# Patient Record
Sex: FEMALE | Race: BLACK OR AFRICAN AMERICAN | NOT HISPANIC OR LATINO | Employment: OTHER | URBAN - METROPOLITAN AREA
[De-identification: names, ages, dates, MRNs, and addresses within clinical notes are randomized per-mention and may not be internally consistent; named-entity substitution may affect disease eponyms.]

---

## 2017-02-08 ENCOUNTER — TRANSFERRED RECORDS (OUTPATIENT)
Dept: HEALTH INFORMATION MANAGEMENT | Facility: CLINIC | Age: 32
End: 2017-02-08

## 2017-02-08 LAB
HPV ABSTRACT: NORMAL
PAP-ABSTRACT: NORMAL

## 2017-06-13 ENCOUNTER — OFFICE VISIT (OUTPATIENT)
Dept: FAMILY MEDICINE | Facility: CLINIC | Age: 32
End: 2017-06-13

## 2017-06-13 VITALS
WEIGHT: 152.8 LBS | BODY MASS INDEX: 26.09 KG/M2 | RESPIRATION RATE: 16 BRPM | SYSTOLIC BLOOD PRESSURE: 103 MMHG | HEART RATE: 77 BPM | DIASTOLIC BLOOD PRESSURE: 69 MMHG | OXYGEN SATURATION: 98 % | TEMPERATURE: 98.2 F | HEIGHT: 64 IN

## 2017-06-13 DIAGNOSIS — L70.0 ACNE VULGARIS: ICD-10-CM

## 2017-06-13 DIAGNOSIS — N97.9 FEMALE INFERTILITY: ICD-10-CM

## 2017-06-13 DIAGNOSIS — Z30.09 FAMILY PLANNING: ICD-10-CM

## 2017-06-13 DIAGNOSIS — Z13.9 SCREENING FOR CONDITION: Primary | ICD-10-CM

## 2017-06-13 RX ORDER — ADAPALENE GEL USP, 0.3% 3 MG/G
GEL TOPICAL AT BEDTIME
Qty: 59 G | Refills: 4 | Status: SHIPPED | OUTPATIENT
Start: 2017-06-13 | End: 2018-12-06

## 2017-06-13 RX ORDER — PNV NO.95/FERROUS FUM/FOLIC AC 28MG-0.8MG
1 TABLET ORAL DAILY
Qty: 90 TABLET | Refills: 3 | Status: SHIPPED | OUTPATIENT
Start: 2017-06-13 | End: 2020-08-24

## 2017-06-13 RX ORDER — CHOLECALCIFEROL (VITAMIN D3) 50 MCG
2000 TABLET ORAL
COMMUNITY
Start: 2017-02-08 | End: 2020-10-05

## 2017-06-13 RX ORDER — CLINDAMYCIN PHOSPHATE 11.9 MG/ML
SOLUTION TOPICAL
COMMUNITY
Start: 2017-02-14 | End: 2018-12-06

## 2017-06-13 RX ORDER — TRETINOIN 0.25 MG/G
CREAM TOPICAL
COMMUNITY
Start: 2017-04-18 | End: 2017-06-13 | Stop reason: ALTCHOICE

## 2017-06-13 RX ORDER — HYDROCORTISONE 2.5 %
CREAM (GRAM) TOPICAL
COMMUNITY
Start: 2017-04-18 | End: 2017-06-13 | Stop reason: ALTCHOICE

## 2017-06-13 ASSESSMENT — ENCOUNTER SYMPTOMS
PALPITATIONS: 0
DYSURIA: 0
HEADACHES: 0
FEVER: 0
FATIGUE: 0
ACTIVITY CHANGE: 0
UNEXPECTED WEIGHT CHANGE: 0
APPETITE CHANGE: 0
COUGH: 0
MYALGIAS: 0
HEMATURIA: 0
SHORTNESS OF BREATH: 0
ARTHRALGIAS: 0

## 2017-06-13 NOTE — MR AVS SNAPSHOT
After Visit Summary   6/13/2017    Gabriela Rooney    MRN: [id number]           Patient Information     Date Of Birth          1985        Visit Information        Provider Department      6/13/2017 4:20 PM Bin Mccollum MD [facility]        Today's Diagnoses     Screening for condition    -  1    Acne vulgaris        Family planning        Female infertility          Care Instructions    Here is the plan from today's visit    1. Screening for Hx of positive PPD (hX OF RECEIVING bcg)  - XR CHEST 1 VW (+PPD)- Normal XR    2. Acne vulgaris  - Stop using Hydrocortisone, and tretinoin cream  - adapalene (DIFFERIN) 0.3 % gel; Apply topically At Bedtime  Dispense: 59 g; Refill: 4  - Cont trying Clindamycin solution.  - Ok to use exfoliating cleanser to wash your face.    3. Family planning  - Ovulation Prediction Test TEST; 1 strip by In Vitro route daily Follow the instructions, and start testing your ovulation on day 9 after your menstrual period start  Dispense: 15 each; Refill: 1  - Take prenatal vitamins 1 tablet daily.    4. Female infertility  Will do blood test  Please come to lab on the 3rd day of your next menstrual period.  - TSH with free T4 reflex; Future  - Prolactin; Future  - Estradiol; Future  - Follicle stimulating hormone; Future  - Lutropin; Future    Follow up plan  In 1 month or sooner if your symptoms worsen.    Thank you for coming to [facility] today.  Lab Testing:  **If you had lab testing today and your results are reassuring or normal they will be mailed to you or sent through [employer] within 7 days.   **If the lab tests need quick action we will call you with the results.  The phone number we will call with results is # [phone] (home) . If this is not the best number please call our clinic and change the number.  Medication Refills:  If you need any refills please call your pharmacy and they will contact us.   If you need to  your  refill at a new pharmacy, please contact the new pharmacy directly. The new pharmacy will help you get your medications transferred faster.   Scheduling:  If you have any concerns about today's visit or wish to schedule another appointment please call our office during normal business hours [phone] (8-5:00 M-F)  If a referral was made to a [facility] and you don't get a call from central scheduling please call [phone].  If a Mammogram was ordered for you at [facility] call [phone] to schedule or change your appointment.  If you had an XRay/CT/Ultrasound/MRI ordered the number is [phone] to schedule or change your radiology appointment.   Medical Concerns:  If you have urgent medical concerns please call [phone] at any time of the day.            Follow-ups after your visit        Future tests that were ordered for you today     Open Future Orders        Priority Expected Expires Ordered    TSH with free T4 reflex Routine  6/13/2018 6/13/2017    Prolactin Routine  6/13/2018 6/13/2017    Estradiol Routine  6/13/2018 6/13/2017    Follicle stimulating hormone Routine  6/13/2018 6/13/2017    Lutropin Routine  6/13/2018 6/13/2017            Who to contact     Please call your clinic at [phone] to:    Ask questions about your health    Make or cancel appointments    Discuss your medicines    Learn about your test results    Speak to your doctor   If you have compliments or concerns about an experience at your clinic, or if you wish to file a complaint, please contact [facility] Patient Relations at [phone] or email us at [email or web address]         Additional Information About Your Visit        [employer] Information     [employer] is an electronic gateway that provides easy, online access to your medical records. With [employer], you can request a clinic appointment, read your test results, renew a prescription or communicate  "with your care team.     To sign up for [employer] visit the website at [email or web address]   You will be asked to enter the access code listed below, as well as some personal information. Please follow the directions to create your username and password.     Your access code is: [email or web address]  Expires: 2017  6:07 PM     Your access code will  in 90 days. If you need help or a new code, please contact your [facility] or call [phone] for assistance.        Care EveryWhere ID     This is your Care EveryWhere ID. This could be used by other organizations to access your [place] medical records  [id number]        Your Vitals Were     Pulse Temperature Respirations Height Pulse Oximetry Breastfeeding?    77 98.2  F (36.8  C) (Oral) 16 5' 4.25\" (163.2 cm) 98% No    BMI (Body Mass Index)                   26.02 kg/m2            Blood Pressure from Last 3 Encounters:   17 103/69   03/03/15 110/68   13 108/68    Weight from Last 3 Encounters:   17 152 lb 12.8 oz (69.3 kg)   03/03/15 151 lb 8 oz (68.7 kg)                 Today's Medication Changes          These changes are accurate as of: 17  6:07 PM.  If you have any questions, ask your nurse or doctor.               Start taking these medicines.        Dose/Directions    adapalene 0.3 % gel   Commonly known as:  DIFFERIN   Used for:  Acne vulgaris   Started by:  Bin Mccollum MD        Apply topically At Bedtime   Quantity:  59 g   Refills:  4       Ovulation Prediction Test Test   Used for:  Family planning   Started by:  Bin Mccollum MD        Dose:  1 strip   1 strip by In Vitro route daily Follow the instructions, and start testing your ovulation on day 9 after your menstrual period start   Quantity:  15 each   Refills:  1       Prenatal Vitamin 27-0.8 MG Tabs   Used for:  Family planning   Started by:  Bin Mccollum MD        Dose:  1 tablet   Take 1 tablet by mouth daily "   Quantity:  90 tablet   Refills:  3         Stop taking these medicines if you haven't already. Please contact your care team if you have questions.     hydrocortisone 2.5 % cream   Stopped by:  Bin Mccollum MD           tretinoin 0.025 % cream   Commonly known as:  RETIN-A   Stopped by:  Bin Mccollum MD                Where to get your medicines      These medications were sent to [facility] [place], [place] - [address], [facility] [address]     Phone:  [phone]     adapalene 0.3 % gel    Ovulation Prediction Test Test    Prenatal Vitamin 27-0.8 MG Tabs                Primary Care Provider Office Phone # Fax #    Bin Mccollum [phone]       [facility] [address]   [facility] [address]        Thank you!     Thank you for choosing [facility]  for your care. Our goal is always to provide you with excellent care. Hearing back from our patients is one way we can continue to improve our services. Please take a few minutes to complete the written survey that you may receive in the mail after your visit with us. Thank you!             Your Updated Medication List - Protect others around you: Learn how to safely use, store and throw away your medicines at www.disposemymeds.org.          This list is accurate as of: 6/13/17  6:07 PM.  Always use your most recent med list.                   Brand Name Dispense Instructions for use    adapalene 0.3 % gel    DIFFERIN    59 g    Apply topically At Bedtime       clindamycin 1 % solution    CLEOCIN T     Apply to face BID       Ovulation Prediction Test Test     15 each    1 strip by In Vitro route daily Follow the instructions, and start testing your ovulation on day 9 after your menstrual period start       Prenatal Vitamin 27-0.8 MG Tabs     90 tablet    Take 1 tablet by mouth daily       vitamin D 2000 UNITS tablet      Take 2,000 Units by mouth

## 2017-06-13 NOTE — PROGRESS NOTES
Preceptor Attestation:   Patient seen and discussed with the resident. Assessment and plan reviewed with resident and agreed upon.   Supervising Physician:  Renetta Oropeza MD  [facility]

## 2017-06-13 NOTE — LETTER
[facility]  [address],  [address]  [facility] [address]  Phone: [phone]  Fax: [phone]    June 13, 2017        Gabriela Rooney  [address] [address]          To whom it may concern:    This patient had a chest XR done on 6/13/2017 which was normal.    Please contact me for questions or concerns.        Sincerely,        Bin Mccollum MD

## 2017-06-13 NOTE — PROGRESS NOTES
HPI:       Gabriela Rooney is a 31 year old who presents today to establish care, and address health concerns.    1- Acne: Pt has Hx of acne, and had been treated in the past. For the last 3 months she believes that it has getting worse. She had been using topical clindamycin since 2017, as well as topical hydrocortisone and tretinoin since 2017 without significant improvement. Recently stopped using the clinda because she though it caused burning sensation after applying to her face.  No allergy reaction reported.  Her skin seems to be worse in the morning, especially if she doesn't wash her face at bedtime. She has oily skin.    2- Concerns for infertility: Had been trying to get pregnant for the last 2 years. She is a , with a miscarriage of her first pregnancy ( at 8 weeks). After that she tried for 3 years before getting pregnant again with her daughter of 3 years. After given birth she had a ParaGard IUD for 1 year (3/2014-3/2015) without complications but she decided to remove it because she wanted to have another child. However it has been two year trying, and she is still not pregnant. She report unprotected sexual intercourse 2-3 times/week. Her menstrual periods are regular every month, no Hx of abnormal Pap smears, no relevant family Hx, no medical problems, or complains. She doesn't exercise regularly but is very active during the day at work.  No smoker or drinker. No Hx of STDs, or PID.  Denies thyroid symptoms, galactorrhea, hirsutism, pelvic/abd pain, dysmenorrhea, or dyspareunia.   Her  is also healthy, and had a semen study  Several years ago (after miscarriage) which was normal.    3- Follow up chest XR results for today, which was done as a request from her work as pt has [place] of [place]. She received the BCG vaccine as a child.  Denies cough, shortness of breath, sweating, weight loss, fever, or hemoptysis.     An [facility]  was used  "for  this visit.      Problem, Medication and Allergy Lists were reviewed and are current.  Patient is an established patient of this clinic.         Review of Systems:   Review of Systems   Constitutional: Negative for activity change, appetite change, fatigue, fever and unexpected weight change.   Respiratory: Negative for cough and shortness of breath.    Cardiovascular: Negative for chest pain and palpitations.   Genitourinary: Negative for dyspareunia, dysuria, hematuria, menstrual problem, pelvic pain, vaginal discharge and vaginal pain.   Musculoskeletal: Negative for arthralgias and myalgias.   Skin: Positive for rash (acne).   Neurological: Negative for headaches.   Psychiatric/Behavioral: Negative for behavioral problems.             Physical Exam:   Patient Vitals for the past 24 hrs:   BP Temp Temp src Pulse Resp SpO2 Height Weight   06/13/17 1639 103/69 98.2  F (36.8  C) Oral 77 16 98 % 5' 4.25\" (163.2 cm) 152 lb 12.8 oz (69.3 kg)     Body mass index is 26.02 kg/(m^2).     Vitals were reviewed and were normal     Physical Exam   Constitutional: She appears well-developed and well-nourished. No distress.   HENT:   Head: Normocephalic and atraumatic.   Eyes: Conjunctivae and EOM are normal.   Neck: Normal range of motion. Neck supple. No thyromegaly present.   Cardiovascular: Normal rate, regular rhythm and normal heart sounds.    No murmur heard.  Pulmonary/Chest: Effort normal and breath sounds normal.   Abdominal: Soft. Bowel sounds are normal. She exhibits no distension and no mass. There is no tenderness.   Musculoskeletal: Normal range of motion. She exhibits no edema, tenderness or deformity.   Neurological: She is alert.   Skin: Skin is warm. Rash noted.   Papulopustular acne w/ some comedones. No scaring or hyperpigmentation.      Psychiatric: She has a normal mood and affect. Her behavior is normal.          Results:   None    Assessment and Plan     1. Acne vulgaris  - Stop topical " Hydrocortisone, and tretinoin cream.  - Start adapalene (DIFFERIN) 0.3 % gel; Apply topically At Bedtime  Dispense: 59 g; Refill: 4  - Cont trying Clindamycin solution.  - Ok to use exfoliating cleanser to wash your face.    2. Family planning  - Ovulation Prediction Test TEST; 1 strip by In Vitro route daily Follow the instructions, and start testing your ovulation on day 9 after your menstrual period start  Dispense: 15 each; Refill: 1  - Take prenatal vitamins 1 tablet daily. (Pt reported that will preferred prenatal instead of just the recommended ac. Folic.)    3. Concern for infertility  Will do blood test. If results are WNL may consider GYN eval for further test such as HSG.  Please come to lab on the 3rd day of your next menstrual period.  - TSH with free T4 reflex; Future  - Prolactin; Future  - Estradiol; Future (on 3rd day of menstrual period)  - Follicle stimulating hormone; Future (on 3rd day of menstrual period)  - Lutropin; Future    4. Screening for TB Hx of positive PPD (Hx of receiving bcg vaccine)- Asymptomatic  - XR CHEST 1 VW (+PPD)- Normal XR  Letter for work given to pt.    Medications Discontinued During This Encounter   Medication Reason     hydrocortisone 2.5 % cream Alternate therapy     tretinoin (RETIN-A) 0.025 % cream Alternate therapy     Options for treatment and follow-up care were reviewed with the patient. Gabriela Rooney  engaged in the decision making process and verbalized understanding of the options discussed and agreed with the final plan.    Bin Mccollum MD

## 2017-06-13 NOTE — PATIENT INSTRUCTIONS
Here is the plan from today's visit    1. Screening for Hx of positive PPD (hX OF RECEIVING bcg)  - XR CHEST 1 VW (+PPD)- Normal XR    2. Acne vulgaris  - Stop using Hydrocortisone, and tretinoin cream  - adapalene (DIFFERIN) 0.3 % gel; Apply topically At Bedtime  Dispense: 59 g; Refill: 4  - Cont trying Clindamycin solution.  - Ok to use exfoliating cleanser to wash your face.    3. Family planning  - Ovulation Prediction Test TEST; 1 strip by In Vitro route daily Follow the instructions, and start testing your ovulation on day 9 after your menstrual period start  Dispense: 15 each; Refill: 1  - Take prenatal vitamins 1 tablet daily.    4. Female infertility  Will do blood test  Please come to lab on the 3rd day of your next menstrual period.  - TSH with free T4 reflex; Future  - Prolactin; Future  - Estradiol; Future  - Follicle stimulating hormone; Future  - Lutropin; Future    Follow up plan  In 1 month or sooner if your symptoms worsen.    Thank you for coming to [facility] today.  Lab Testing:  **If you had lab testing today and your results are reassuring or normal they will be mailed to you or sent through "[employer] within 7 days.   **If the lab tests need quick action we will call you with the results.  The phone number we will call with results is # [phone] (home) . If this is not the best number please call our clinic and change the number.  Medication Refills:  If you need any refills please call your pharmacy and they will contact us.   If you need to  your refill at a new pharmacy, please contact the new pharmacy directly. The new pharmacy will help you get your medications transferred faster.   Scheduling:  If you have any concerns about today's visit or wish to schedule another appointment please call our office during normal business hours [phone] (8-5:00 M-F)  If a referral was made to a [facility] and you don't get a call from central scheduling please  call [phone].  If a Mammogram was ordered for you at [facility] call [phone] to schedule or change your appointment.  If you had an XRay/CT/Ultrasound/MRI ordered the number is [phone] to schedule or change your radiology appointment.   Medical Concerns:  If you have urgent medical concerns please call [phone] at any time of the day.

## 2017-08-07 ENCOUNTER — APPOINTMENT (OUTPATIENT)
Dept: ULTRASOUND IMAGING | Facility: CLINIC | Age: 32
End: 2017-08-07
Attending: EMERGENCY MEDICINE
Payer: COMMERCIAL

## 2017-08-07 ENCOUNTER — HOSPITAL ENCOUNTER (EMERGENCY)
Facility: CLINIC | Age: 32
Discharge: HOME | End: 2017-08-07
Attending: EMERGENCY MEDICINE | Admitting: EMERGENCY MEDICINE
Payer: COMMERCIAL

## 2017-08-07 VITALS
RESPIRATION RATE: 16 BRPM | HEART RATE: 61 BPM | BODY MASS INDEX: 26.04 KG/M2 | OXYGEN SATURATION: 98 % | TEMPERATURE: 99.1 F | WEIGHT: 152.9 LBS | SYSTOLIC BLOOD PRESSURE: 100 MMHG | DIASTOLIC BLOOD PRESSURE: 64 MMHG

## 2017-08-07 DIAGNOSIS — N76.0 ACUTE VAGINITIS: ICD-10-CM

## 2017-08-07 DIAGNOSIS — N83.201 OVARIAN CYST, RIGHT: ICD-10-CM

## 2017-08-07 DIAGNOSIS — B96.89 BACTERIAL VAGINITIS: ICD-10-CM

## 2017-08-07 DIAGNOSIS — N76.0 BACTERIAL VAGINITIS: ICD-10-CM

## 2017-08-07 DIAGNOSIS — A49.8 CLOSTRIDIUM DIFFICILE INFECTION: ICD-10-CM

## 2017-08-07 DIAGNOSIS — N83.201 CYST OF RIGHT OVARY: ICD-10-CM

## 2017-08-07 LAB
ABO + RH BLD: NORMAL
ABO + RH BLD: NORMAL
ALBUMIN UR-MCNC: NEGATIVE MG/DL
ANION GAP SERPL CALCULATED.3IONS-SCNC: 8 MMOL/L (ref 3–14)
APPEARANCE UR: CLEAR
BASOPHILS # BLD AUTO: 0 10E9/L (ref 0–0.2)
BASOPHILS NFR BLD AUTO: 0.3 %
BILIRUB UR QL STRIP: NEGATIVE
BUN SERPL-MCNC: 9 MG/DL (ref 7–30)
CALCIUM SERPL-MCNC: 9 MG/DL (ref 8.5–10.1)
CHLORIDE SERPL-SCNC: 109 MMOL/L (ref 94–109)
CO2 SERPL-SCNC: 27 MMOL/L (ref 20–32)
COLOR UR AUTO: NORMAL
CREAT SERPL-MCNC: 0.62 MG/DL (ref 0.52–1.04)
DIFFERENTIAL METHOD BLD: ABNORMAL
EOSINOPHIL # BLD AUTO: 0.1 10E9/L (ref 0–0.7)
EOSINOPHIL NFR BLD AUTO: 4.3 %
ERYTHROCYTE [DISTWIDTH] IN BLOOD BY AUTOMATED COUNT: 12.5 % (ref 10–15)
GFR SERPL CREATININE-BSD FRML MDRD: NORMAL ML/MIN/1.7M2
GLUCOSE SERPL-MCNC: 88 MG/DL (ref 70–99)
GLUCOSE UR STRIP-MCNC: NEGATIVE MG/DL
HCG UR QL: NEGATIVE
HCT VFR BLD AUTO: 41.8 % (ref 35–47)
HGB BLD-MCNC: 14 G/DL (ref 11.7–15.7)
HGB UR QL STRIP: NEGATIVE
IMM GRANULOCYTES # BLD: 0 10E9/L (ref 0–0.4)
IMM GRANULOCYTES NFR BLD: 0 %
INTERNAL QC OK POCT: YES
KETONES UR STRIP-MCNC: NEGATIVE MG/DL
LEUKOCYTE ESTERASE UR QL STRIP: NEGATIVE
LYMPHOCYTES # BLD AUTO: 1.4 10E9/L (ref 0.8–5.3)
LYMPHOCYTES NFR BLD AUTO: 45.8 %
MCH RBC QN AUTO: 30.1 PG (ref 26.5–33)
MCHC RBC AUTO-ENTMCNC: 33.5 G/DL (ref 31.5–36.5)
MCV RBC AUTO: 90 FL (ref 78–100)
MICRO REPORT STATUS: ABNORMAL
MONOCYTES # BLD AUTO: 0.4 10E9/L (ref 0–1.3)
MONOCYTES NFR BLD AUTO: 14 %
NEUTROPHILS # BLD AUTO: 1.1 10E9/L (ref 1.6–8.3)
NEUTROPHILS NFR BLD AUTO: 35.6 %
NITRATE UR QL: NEGATIVE
NRBC # BLD AUTO: 0 10*3/UL
NRBC BLD AUTO-RTO: 0 /100
PH UR STRIP: 5.5 PH (ref 5–7)
PLATELET # BLD AUTO: 171 10E9/L (ref 150–450)
POTASSIUM SERPL-SCNC: 3.5 MMOL/L (ref 3.4–5.3)
RBC # BLD AUTO: 4.65 10E12/L (ref 3.8–5.2)
RBC #/AREA URNS AUTO: 1 /HPF (ref 0–2)
SODIUM SERPL-SCNC: 144 MMOL/L (ref 133–144)
SP GR UR STRIP: 1.01 (ref 1–1.03)
SPECIMEN EXP DATE BLD: NORMAL
SPECIMEN SOURCE: ABNORMAL
URN SPEC COLLECT METH UR: NORMAL
UROBILINOGEN UR STRIP-MCNC: NORMAL MG/DL (ref 0–2)
WBC # BLD AUTO: 3 10E9/L (ref 4–11)
WBC #/AREA URNS AUTO: <1 /HPF (ref 0–2)
WET PREP SPEC: ABNORMAL

## 2017-08-07 PROCEDURE — 99284 EMERGENCY DEPT VISIT MOD MDM: CPT | Mod: Z6 | Performed by: EMERGENCY MEDICINE

## 2017-08-07 PROCEDURE — 86901 BLOOD TYPING SEROLOGIC RH(D): CPT | Performed by: EMERGENCY MEDICINE

## 2017-08-07 PROCEDURE — 81001 URINALYSIS AUTO W/SCOPE: CPT | Performed by: EMERGENCY MEDICINE

## 2017-08-07 PROCEDURE — 87210 SMEAR WET MOUNT SALINE/INK: CPT | Performed by: EMERGENCY MEDICINE

## 2017-08-07 PROCEDURE — 87591 N.GONORRHOEAE DNA AMP PROB: CPT | Performed by: EMERGENCY MEDICINE

## 2017-08-07 PROCEDURE — 86900 BLOOD TYPING SEROLOGIC ABO: CPT | Performed by: EMERGENCY MEDICINE

## 2017-08-07 PROCEDURE — 93976 VASCULAR STUDY: CPT

## 2017-08-07 PROCEDURE — 85025 COMPLETE CBC W/AUTO DIFF WBC: CPT | Performed by: EMERGENCY MEDICINE

## 2017-08-07 PROCEDURE — 87491 CHLMYD TRACH DNA AMP PROBE: CPT | Performed by: EMERGENCY MEDICINE

## 2017-08-07 PROCEDURE — 80048 BASIC METABOLIC PNL TOTAL CA: CPT | Performed by: EMERGENCY MEDICINE

## 2017-08-07 PROCEDURE — 81025 URINE PREGNANCY TEST: CPT | Performed by: EMERGENCY MEDICINE

## 2017-08-07 PROCEDURE — 99284 EMERGENCY DEPT VISIT MOD MDM: CPT | Mod: 25 | Performed by: EMERGENCY MEDICINE

## 2017-08-07 RX ORDER — METRONIDAZOLE 500 MG/1
500 TABLET ORAL 2 TIMES DAILY
Qty: 14 TABLET | Refills: 0 | Status: SHIPPED | OUTPATIENT
Start: 2017-08-07 | End: 2017-08-14

## 2017-08-07 NOTE — ED AVS SNAPSHOT
[facility], [place], Emergency Department    [address]    [place] [address]    Phone:  [phone]    Fax:  [phone]                                       Trina Rooney   MRN: [id number]    Department:  [facility], Emergency Department   Date of Visit:  8/7/2017           After Visit Summary Signature Page     I have received my discharge instructions, and my questions have been answered. I have discussed any challenges I see with this plan with the nurse or doctor.    ..........................................................................................................................................  Patient/Patient Representative Signature      ..........................................................................................................................................  Patient Representative Print Name and Relationship to Patient    ..................................................               ................................................  Date                                            Time    ..........................................................................................................................................  Reviewed by Signature/Title    ...................................................              ..............................................  Date                                                            Time

## 2017-08-07 NOTE — ED PROVIDER NOTES
History     Chief Complaint   Patient presents with     Vaginal Bleeding     vaginal bleeding started last night, abdominal pain. Pt states it is not her period     HPI  Trina Rooney is a 31 year old female who presents to emergency with complaints of vaginal bleeding that is not like her usual period.  Patient states that her last normal menstrual period was in June more than 1 month ago, and states that this bleeding has brighter red blood than her normal. Patient states she also has some right lower quadrant abdominal pain associated with this bleeding.  She denies any UTI symptoms, fevers, nausea, vomiting, or diarrhea.  Patient states that her menstrual cycles are usually irregular.    This part of the medical record was transcribed by Phillip Yeager Medical Scribe, from a dictation done by Williams Tatum MD.     PAST MEDICAL HISTORY  History reviewed. No pertinent past medical history.  PAST SURGICAL HISTORY  History reviewed. No pertinent surgical history.  FAMILY HISTORY  Family History   Problem Relation Age of Onset     Family History Negative       SOCIAL HISTORY  Social History   Substance Use Topics     Smoking status: Never Smoker     Smokeless tobacco: Never Used     Alcohol use No     MEDICATIONS  Previous Medications    ADAPALENE (DIFFERIN) 0.3 % GEL    Apply topically At Bedtime    CHOLECALCIFEROL (VITAMIN D) 2000 UNITS TABLET    Take 2,000 Units by mouth    CLINDAMYCIN (CLEOCIN T) 1 % SOLUTION    Apply to face BID    OVULATION PREDICTION TEST TEST    1 strip by In Vitro route daily Follow the instructions, and start testing your ovulation on day 9 after your menstrual period start    PRENATAL VIT-FE FUMARATE-FA (PRENATAL VITAMIN) 27-0.8 MG TABS    Take 1 tablet by mouth daily     ALLERGIES  No Known Allergies      I have reviewed the Medications, Allergies, Past Medical and Surgical History, and Social History in the Epic system.    Review of Systems   All other systems reviewed and are  negative.      Physical Exam   BP: 113/73  Pulse: 61  Temp: 99.1  F (37.3  C)  Resp: 16  Weight: 69.4 kg (152 lb 14.4 oz)  SpO2: 100 %  Physical Exam   Constitutional: She is oriented to person, place, and time. She appears well-nourished.   Alert conversant and moves about without difficulty   HENT:   Head: Atraumatic.   Eyes: EOM are normal. Pupils are equal, round, and reactive to light.   Neck: Neck supple.   Cardiovascular: Normal heart sounds.    Pulmonary/Chest: Breath sounds normal.   Abdominal: Soft. There is tenderness (right lower quadrant). There is no rebound and no guarding.   Genitourinary:   Genitourinary Comments: Pelvic exam reveals minimal blood.  Cultures were sent to the lab and the patient had some slight cervical motion tenderness with right adnexal tenderness.   Musculoskeletal: She exhibits no edema or tenderness.   Neurological: She is alert and oriented to person, place, and time.   Grossly intact and symmetric   Skin: Skin is warm.   Psychiatric: She has a normal mood and affect.       ED Course     ED Course     Procedures        Results for orders placed or performed during the hospital encounter of 08/07/17   US Pelvis Cmplt w Transvag & Doppler LmtPel Duplex Limited    Narrative    PELVIC ULTRASOUND WITH ENDOVAGINAL TRANSDUCER  IMAGING  8/7/2017 3:39  PM     HISTORY: Rule out torsion.    TECHNIQUE:  Endovaginal sonography was added to the transabdominal  exam to better evaluate the uterus and ovaries.    COMPARISON: None.    FINDINGS: Endometrium is 6 mm thick. Uterus is 6.4 x 5.2 x 3.9 cm.  There is complex fluid dilating the endocervical canal with some  internal focal echogenic material within the fluid. The fluid measures  a thickness of 1.1 cm. Left ovary appears normal. The right ovary  contains a 1.3 cm complex cystic lesion. Color and Doppler spectral  analysis of each ovary shows normal waveforms.      Impression    IMPRESSION:    1. Mildly complex cystic structure at the  right ovary is only 1.3 cm.  No free fluid. This could just be a functional cyst or hemorrhagic  cyst. Recommend a follow-up pelvic ultrasound in 6-8 weeks to ensure  resolution.  2. Moderate fluid distends the endocervical canal appears complex.  This could represent blood products.   CBC with platelets differential   Result Value Ref Range    WBC 3.0 (L) 4.0 - 11.0 10e9/L    RBC Count 4.65 3.8 - 5.2 10e12/L    Hemoglobin 14.0 11.7 - 15.7 g/dL    Hematocrit 41.8 35.0 - 47.0 %    MCV 90 78 - 100 fl    MCH 30.1 26.5 - 33.0 pg    MCHC 33.5 31.5 - 36.5 g/dL    RDW 12.5 10.0 - 15.0 %    Platelet Count 171 150 - 450 10e9/L    Diff Method Automated Method     % Neutrophils 35.6 %    % Lymphocytes 45.8 %    % Monocytes 14.0 %    % Eosinophils 4.3 %    % Basophils 0.3 %    % Immature Granulocytes 0.0 %    Nucleated RBCs 0 0 /100    Absolute Neutrophil 1.1 (L) 1.6 - 8.3 10e9/L    Absolute Lymphocytes 1.4 0.8 - 5.3 10e9/L    Absolute Monocytes 0.4 0.0 - 1.3 10e9/L    Absolute Eosinophils 0.1 0.0 - 0.7 10e9/L    Absolute Basophils 0.0 0.0 - 0.2 10e9/L    Abs Immature Granulocytes 0.0 0 - 0.4 10e9/L    Absolute Nucleated RBC 0.0    Basic metabolic panel   Result Value Ref Range    Sodium 144 133 - 144 mmol/L    Potassium 3.5 3.4 - 5.3 mmol/L    Chloride 109 94 - 109 mmol/L    Carbon Dioxide 27 20 - 32 mmol/L    Anion Gap 8 3 - 14 mmol/L    Glucose 88 70 - 99 mg/dL    Urea Nitrogen 9 7 - 30 mg/dL    Creatinine 0.62 0.52 - 1.04 mg/dL    GFR Estimate >90  Non  GFR Calc   >60 mL/min/1.7m2    GFR Estimate If Black >90   GFR Calc   >60 mL/min/1.7m2    Calcium 9.0 8.5 - 10.1 mg/dL   UA with Microscopic reflex to Culture   Result Value Ref Range    Color Urine Light Yellow     Appearance Urine Clear     Glucose Urine Negative NEG mg/dL    Bilirubin Urine Negative NEG    Ketones Urine Negative NEG mg/dL    Specific Gravity Urine 1.006 1.003 - 1.035    Blood Urine Negative NEG    pH Urine 5.5 5.0 - 7.0 pH     Protein Albumin Urine Negative NEG mg/dL    Urobilinogen mg/dL Normal 0.0 - 2.0 mg/dL    Nitrite Urine Negative NEG    Leukocyte Esterase Urine Negative NEG    Source Unspecified Urine     WBC Urine <1 0 - 2 /HPF    RBC Urine 1 0 - 2 /HPF   hCG qual urine POCT   Result Value Ref Range    HCG Qual Urine Negative neg    Internal QC OK Yes    ABO and Rh   Result Value Ref Range    ABO O     RH(D)  Pos     Specimen Expires 08/10/2017    Wet prep   Result Value Ref Range    Specimen Description Vagina     Wet Prep (A)      No Trichomonas seen  Rare Clue cells seen  Few PMNs seen  No yeast seen      Micro Report Status FINAL 08/07/2017        Labs Ordered and Resulted from Time of ED Arrival Up to the Time of Departure from the ED   CBC WITH PLATELETS DIFFERENTIAL - Abnormal; Notable for the following:        Result Value    WBC 3.0 (*)     Absolute Neutrophil 1.1 (*)     All other components within normal limits   WET PREP - Abnormal; Notable for the following:     Wet Prep   (*)     Value: No Trichomonas seen  Rare Clue cells seen  Few PMNs seen  No yeast seen      All other components within normal limits   HCG QUAL URINE POCT - Normal   BASIC METABOLIC PANEL   ROUTINE UA WITH MICROSCOPIC REFLEX TO CULTURE   PERIPHERAL IV CATHETER   PREP FOR PROCEDURE   ABO AND RH   CHLAMYDIA TRACHOMATIS PCR   NEISSERIA GONORRHOEAE PCR         Assessments & Plan (with Medical Decision Making)     I have reviewed the nursing notes.    Differential diagnosis upon presentation to the ER included UTI, ovarian torsion, ectopic pregnancy, salpingitis/PID.  Bimanual exam isolated the tenderness to the right adnexa and ultrasound revealed an ovarian cyst without torsion.    I have reviewed the findings, diagnosis, plan and need for follow up with the patient.    New Prescriptions    METRONIDAZOLE (FLAGYL) 500 MG TABLET    Take 1 tablet (500 mg) by mouth 2 times daily for 7 days       Final diagnoses:   Bacterial vaginitis   Ovarian cyst,  right     Please make an appointment to follow up with Your Primary Care Provider or our OB/Gyn--[facility] (phone: [phone]) in 10-14 days for recheck.    Routine discharge instructions were given for the diagnoses above.    Williams Tatum MD    8/7/2017   [facility], EMERGENCY DEPARTMENT     Williams Tatum MD  08/07/17 5976

## 2017-08-07 NOTE — DISCHARGE INSTRUCTIONS
Please make an appointment to follow up with Your Primary Care Provider or our OB/Gyn--[facility] (phone: [phone]) in 10-14 days for recheck.

## 2017-08-07 NOTE — ED AVS SNAPSHOT
[facility], Emergency Department    [address] [place] [address]    Phone:  [phone]    Fax:  [phone]                                       Trina Rooney   MRN: [id number]    Department:  [facility], Emergency Department   Date of Visit:  8/7/2017           Patient Information     Date Of Birth          1985        Your diagnoses for this visit were:     Bacterial vaginitis     Ovarian cyst, right        You were seen by Williams Tatum MD.        Discharge Instructions       Please make an appointment to follow up with Your Primary Care Provider or our OB/Gyn--[facility] (phone: [phone]) in 10-14 days for recheck.    Discharge References/Attachments     BACTERIAL VAGINOSIS (BV) (ENGLISH)    OVARIAN CYST (ENGLISH)      24 Hour Appointment Hotline       To make an appointment at any [facility], call [phone]). If you don't have a family doctor or clinic, we will help you find one. [facility] clinics are conveniently located to serve the needs of you and your family.             Review of your medicines      START taking        Dose / Directions Last dose taken    metroNIDAZOLE 500 MG tablet   Commonly known as:  FLAGYL   Dose:  500 mg   Quantity:  14 tablet        Take 1 tablet (500 mg) by mouth 2 times daily for 7 days   Refills:  0          Our records show that you are taking the medicines listed below. If these are incorrect, please call your family doctor or clinic.        Dose / Directions Last dose taken    adapalene 0.3 % gel   Commonly known as:  DIFFERIN   Quantity:  59 g        Apply topically At Bedtime   Refills:  4        clindamycin 1 % solution   Commonly known as:  CLEOCIN T        Apply to face BID   Refills:  0        Ovulation Prediction Test Test   Dose:  1 strip   Quantity:  15 each        1 strip by In Vitro route daily Follow the instructions, and start testing your ovulation on day 9 after your  menstrual period start   Refills:  1        Prenatal Vitamin 27-0.8 MG Tabs   Dose:  1 tablet   Quantity:  90 tablet        Take 1 tablet by mouth daily   Refills:  3        vitamin D 2000 UNITS tablet   Dose:  2000 Units        Take 2,000 Units by mouth   Refills:  0                Prescriptions were sent or printed at these locations (1 Prescription)                   Other Prescriptions                Printed at Department/Unit printer (1 of 1)         metroNIDAZOLE (FLAGYL) 500 MG tablet                Procedures and tests performed during your visit     ABO and Rh    Basic metabolic panel    CBC with platelets differential    Chlamydia trachomatis PCR    Neisseria gonorrhoea PCR    Peripheral IV: Standard    Prep for procedure - pelvic exam    UA with Microscopic reflex to Culture    US Pelvis Cmplt w Transvag & Doppler LmtPel Duplex Limited    Wet prep    hCG qual urine POCT      Orders Needing Specimen Collection     None      Pending Results     Date and Time Order Name Status Description    8/7/2017 1509 US Pelvis Cmplt w Transvag & Doppler LmtPel Duplex Limited Preliminary     8/7/2017 1434 Neisseria gonorrhoea PCR In process     8/7/2017 1434 Chlamydia trachomatis PCR In process             Pending Culture Results     Date and Time Order Name Status Description    8/7/2017 1434 Neisseria gonorrhoea PCR In process     8/7/2017 1434 Chlamydia trachomatis PCR In process             Pending Results Instructions     If you had any lab results that were not finalized at the time of your Discharge, you can call the ED Lab Result RN at [phone]. You will be contacted by this team for any positive Lab results or changes in treatment. The nurses are available 7 days a week from 10A to 6:30P.  You can leave a message 24 hours per day and they will return your call.        Thank you for choosing [facility]       Thank you for choosing [facility] for your care. Our goal is always to provide you with excellent care.  "Hearing back from our patients is one way we can continue to improve our services. Please take a few minutes to complete the written survey that you may receive in the mail after you visit with us. Thank you!        [employer] Information     [employer] lets you send messages to your doctor, view your test results, renew your prescriptions, schedule appointments and more. To sign up, go to [email or web address] . Click on \"Log in\" on the left side of the screen, which will take you to the Welcome page. Then click on \"Sign up Now\" on the right side of the page.     You will be asked to enter the access code listed below, as well as some personal information. Please follow the directions to create your username and password.     Your access code is: [email or web address]  Expires: 2017  6:07 PM     Your access code will  in 90 days. If you need help or a new code, please call your [place] clinic or [phone].        Care EveryWhere ID     This is your Care EveryWhere ID. This could be used by other organizations to access your [place] medical records  [id number]        Equal Access to Services     ERICKA CHAVARRIA : Hadmaryan Mcgowan, lisbeth kee, jigna ho, dotty roth . So [facility] [phone].    ATENCIÓN: Si habla español, tiene a sam disposición servicios gratuitos de asistencia lingüística. Llame al [phone].    We comply with applicable federal civil rights laws and [place] laws. We do not discriminate on the basis of race, color, national origin, age, disability sex, sexual orientation or gender identity.            After Visit Summary       This is your record. Keep this with you and show to your community pharmacist(s) and doctor(s) at your next visit.                  "

## 2017-08-08 LAB
C TRACH DNA SPEC QL NAA+PROBE: NORMAL
N GONORRHOEA DNA SPEC QL NAA+PROBE: NORMAL
SPECIMEN SOURCE: NORMAL
SPECIMEN SOURCE: NORMAL

## 2017-10-31 ENCOUNTER — OFFICE VISIT (OUTPATIENT)
Dept: FAMILY MEDICINE | Facility: CLINIC | Age: 32
End: 2017-10-31

## 2017-10-31 VITALS
TEMPERATURE: 98 F | SYSTOLIC BLOOD PRESSURE: 102 MMHG | RESPIRATION RATE: 20 BRPM | OXYGEN SATURATION: 98 % | HEART RATE: 65 BPM | BODY MASS INDEX: 26.06 KG/M2 | WEIGHT: 153 LBS | DIASTOLIC BLOOD PRESSURE: 67 MMHG

## 2017-10-31 DIAGNOSIS — B35.9 TINEA: ICD-10-CM

## 2017-10-31 DIAGNOSIS — N97.9 FEMALE INFERTILITY: Primary | ICD-10-CM

## 2017-10-31 RX ORDER — CLOTRIMAZOLE 1 %
CREAM (GRAM) TOPICAL 2 TIMES DAILY
Qty: 15 G | Refills: 1 | Status: SHIPPED | OUTPATIENT
Start: 2017-10-31 | End: 2018-12-06

## 2017-10-31 ASSESSMENT — ENCOUNTER SYMPTOMS
FEVER: 0
DIFFICULTY URINATING: 0
DYSURIA: 0
SHORTNESS OF BREATH: 0
ABDOMINAL PAIN: 0
CHILLS: 0
COUGH: 0

## 2017-10-31 NOTE — PROGRESS NOTES
Preceptor Attestation:   Patient seen and discussed with the resident. Assessment and plan reviewed with resident and agreed upon.   Supervising Physician:  Renetta Oropeza MD  [facility]

## 2017-10-31 NOTE — PROGRESS NOTES
HPI:       Trina Rooney is a 31 year old who presents for the following  Patient presents with:  RECHECK: Discuss about Fertility     She has been trying to get pregnant for the past 2 years. Last child was born in 2014 by  at [facility]. Had paraguard IUD for 1 year that was removed in 3/2015. Her menses have been regular for the past 4 months and were irregular for 2 months. States that her menstrual cycle is 28 days long and her menses last for 3 days with normal flow. LMP: 10/26/17. She has sexual intercourse with her  on day 10-21 of her cycle every day or every other day. No previous medical history of STD's or PID. No previous radiation or chemotherapy.     Had previous lab work up for infertility by OB/GYN at [facility] in 2017. She used ovulation kits for one month however she is unable to use them now due to cost and insurance does not cover them.  had a normal semen analysis in . In  she had one miscarriage at 6 weeks with no chromosomal testing. No family history of miscarriages or birth defects. Has been taking prenatal vitamins.     Itchy ring-shaped rash on left upper arm that is unchanged for one week. Her daughter has a similar rash on her scalp that is worsening. Has never had this before.     Problem, Medication and Allergy Lists were reviewed and are current.  Patient is   an established patient of this clinic., History reviewed. No pertinent past medical history.,   Family History     Problem (# of Occurrences) Relation (Name,Age of Onset)    Family History Negative (1) Other       and   Social History     Social History     Marital status: Single     Spouse name: N/A     Number of children: N/A     Years of education: N/A     Social History Main Topics     Smoking status: Never Smoker     Smokeless tobacco: Never Used     Alcohol use No     Drug use: No     Sexual activity: Yes     Partners: Male            Review of Systems:   Review of Systems   Constitutional:  Negative for chills and fever.   HENT: Negative for congestion.    Respiratory: Negative for cough and shortness of breath.    Cardiovascular: Negative for chest pain.   Gastrointestinal: Negative for abdominal pain.   Genitourinary: Negative for difficulty urinating, dyspareunia, dysuria, genital sores, menstrual problem, pelvic pain, vaginal bleeding and vaginal discharge.   Skin: Positive for rash.             Physical Exam:     Patient Vitals for the past 24 hrs:   BP Temp Temp src Pulse Resp SpO2 Weight   10/31/17 1308 102/67 98  F (36.7  C) Oral 65 20 98 % 153 lb (69.4 kg)     Body mass index is 26.06 kg/(m^2).  Vitals were reviewed and were normal     Physical Exam   Constitutional: She appears well-developed and well-nourished.   HENT:   Head: Normocephalic and atraumatic.   Eyes: Conjunctivae are normal.   Cardiovascular: Normal rate and regular rhythm.    No murmur heard.  Pulmonary/Chest: Effort normal and breath sounds normal. She has no wheezes.   Abdominal: Soft. Bowel sounds are normal. She exhibits no distension. There is no tenderness. There is no rebound and no guarding.   Genitourinary: Vagina normal and uterus normal. Right adnexum displays no mass and no tenderness. Left adnexum displays no mass and no tenderness.   Musculoskeletal: She exhibits no edema or tenderness.   Skin: Skin is warm and dry.   Annular red, scaly lesion is present on the left forearm    Psychiatric: She has a normal mood and affect. Her behavior is normal.         Results:      Results from the last 24 hoursNo results found for this or any previous visit (from the past 24 hour(s)).  Assessment and Plan     Trina was seen today for recheck.    Diagnoses and all orders for this visit:    Female infertility  Prior work up with [facility] was normal with FSH (2.8), progesterone (2.4), estradiol (68), TSH (0.79) and anti-mullerian hormone (1.1-low normal). OB/GYN recommends if normal lab work up, HSG and semen analysis for  further evaluation. Patient desires to follow up with OB/GYN at [place] instead of [facility]. Recommended OB/GYN referral for HSG and semen analysis. Continue to have sexual intercourse at fertile period of day 9-16 of the menstrual cycle and use ovulation kits as able.   -     OB/GYN REFERRAL - INTERNAL    Tinea  Consistent with tinea corporis. Treatment with clotrimazole BID. Follow up if no improvement. Recommended that daughter seek medical treatment as well.   -     clotrimazole (LOTRIMIN) 1 % cream; Apply topically 2 times daily    There are no discontinued medications.  Options for treatment and follow-up care were reviewed with the patient. Trina Rooney  engaged in the decision making process and verbalized understanding of the options discussed and agreed with the final plan.    Nettie Andre MD

## 2017-10-31 NOTE — MR AVS SNAPSHOT
After Visit Summary   10/31/2017    Trina Rooney    MRN: [id number]           Patient Information     Date Of Birth          1985        Visit Information        Provider Department      10/31/2017 1:00 PM Rani Wren MD [facility]        Today's Diagnoses     Female infertility    -  1    Tinea          Care Instructions    Here is the plan from today's visit    1. Female infertility  - OB/GYN REFERRAL - INTERNAL    2. Tinea  - clotrimazole (LOTRIMIN) 1 % cream; Apply topically 2 times daily  Dispense: 15 g; Refill: 1    Please call or return to clinic if your symptoms don't go away.    Follow up plan  Please make a clinic appointment for follow up with me (RANI WREN) if no improvement in rash.     Thank you for coming to [facility] today.  Lab Testing:  **If you had lab testing today and your results are reassuring or normal they will be mailed to you or sent through [employer] within 7 days.   **If the lab tests need quick action we will call you with the results.  The phone number we will call with results is # [phone] (home) . If this is not the best number please call our clinic and change the number.  Medication Refills:  If you need any refills please call your pharmacy and they will contact us.   If you need to  your refill at a new pharmacy, please contact the new pharmacy directly. The new pharmacy will help you get your medications transferred faster.   Scheduling:  If you have any concerns about today's visit or wish to schedule another appointment please call our office during normal business hours [phone] (8-5:00 M-F)  If a referral was made to a [facility] and you don't get a call from central scheduling please call [phone].  If a Mammogram was ordered for you at [facility] call [phone] to schedule or change your appointment.  If you had an XRay/CT/Ultrasound/MRI ordered the number  is [phone] to schedule or change your radiology appointment.   Medical Concerns:  If you have urgent medical concerns please call [phone] at any time of the day.            Follow-ups after your visit        Additional Services     OB/GYN REFERRAL - INTERNAL       Your provider has referred you to:  SHARON: [facility] [phone]   [email or web address]    Please be aware that coverage of these services is subject to the terms and limitations of your health insurance plan.  Call member services at your health plan with any benefit or coverage questions.      Please bring the following with you to your appointment:    (1) Any X-Rays, CTs or MRIs which have been performed.  Contact the facility where they were done to arrange for  prior to your scheduled appointment.   (2) List of current medications   (3) This referral request   (4) Any documents/labs given to you for this referral                  Who to contact     Please call your clinic at [phone] to:    Ask questions about your health    Make or cancel appointments    Discuss your medicines    Learn about your test results    Speak to your doctor   If you have compliments or concerns about an experience at your clinic, or if you wish to file a complaint, please contact [facility] Patient Relations at [phone] or email us at [email or web address]         Additional Information About Your Visit        [employer] Information     OpenROVt is an electronic gateway that provides easy, online access to your medical records. With C-Note, you can request a clinic appointment, read your test results, renew a prescription or communicate with your care team.     To sign up for OpenROVt visit the website at [email or web address]   You will be asked to enter the access code listed below, as well as some personal information. Please follow the directions to  create your username and password.     Your access code is: [email or web address]  Expires: 2018  1:57 PM     Your access code will  in 90 days. If you need help or a new code, please contact your [facility] or call [phone] for assistance.        Care EveryWhere ID     This is your Care EveryWhere ID. This could be used by other organizations to access your [facility] medical records  [id number]        Your Vitals Were     Pulse Temperature Respirations Last Period Pulse Oximetry Breastfeeding?    65 98  F (36.7  C) (Oral) 20 10/27/2017 98% No    BMI (Body Mass Index)                   26.06 kg/m2            Blood Pressure from Last 3 Encounters:   10/31/17 102/67   17 100/64   17 103/69    Weight from Last 3 Encounters:   10/31/17 153 lb (69.4 kg)   17 152 lb 14.4 oz (69.4 kg)   17 152 lb 12.8 oz (69.3 kg)              We Performed the Following     OB/GYN REFERRAL - INTERNAL          Today's Medication Changes          These changes are accurate as of: 10/31/17  1:58 PM.  If you have any questions, ask your nurse or doctor.               Start taking these medicines.        Dose/Directions    clotrimazole 1 % cream   Commonly known as:  LOTRIMIN   Used for:  Tinea   Started by:  Nettie Andre MD        Apply topically 2 times daily   Quantity:  15 g   Refills:  1            Where to get your medicines      These medications were sent to [facility] [address] [place] [address] AT [address] [address]     Phone:  [phone]     clotrimazole 1 % cream                Primary Care Provider Office Phone # Fax #    [facility] [phone] [address] [facility] [address]        Equal Access to Services     ERICKA CHAVARRIA AH: Cheng Mcgowan, wajazzmineda vidhya, qaedelmirata dotty ruth  ah. So [facility] [phone].    ATENCIÓN: Si cyndy harris, tiene a sam disposición servicios gratuitos de asistencia lingüística. Gerry al [phone].    We comply with applicable federal civil rights laws and [place] laws. We do not discriminate on the basis of race, color, national origin, age, disability, sex, sexual orientation, or gender identity.            Thank you!     Thank you for choosing [facility]  for your care. Our goal is always to provide you with excellent care. Hearing back from our patients is one way we can continue to improve our services. Please take a few minutes to complete the written survey that you may receive in the mail after your visit with us. Thank you!             Your Updated Medication List - Protect others around you: Learn how to safely use, store and throw away your medicines at www.disposemymeds.org.          This list is accurate as of: 10/31/17  1:58 PM.  Always use your most recent med list.                   Brand Name Dispense Instructions for use Diagnosis    adapalene 0.3 % gel    DIFFERIN    59 g    Apply topically At Bedtime    Acne vulgaris       clindamycin 1 % solution    CLEOCIN T     Apply to face BID        clotrimazole 1 % cream    LOTRIMIN    15 g    Apply topically 2 times daily    Tinea       Ovulation Prediction Test Test     15 each    1 strip by In Vitro route daily Follow the instructions, and start testing your ovulation on day 9 after your menstrual period start    Family planning       Prenatal Vitamin 27-0.8 MG Tabs     90 tablet    Take 1 tablet by mouth daily    Family planning       vitamin D 2000 UNITS tablet      Take 2,000 Units by mouth

## 2017-10-31 NOTE — PATIENT INSTRUCTIONS
Here is the plan from today's visit    1. Female infertility  - OB/GYN REFERRAL - INTERNAL    2. Tinea  - clotrimazole (LOTRIMIN) 1 % cream; Apply topically 2 times daily  Dispense: 15 g; Refill: 1    Please call or return to clinic if your symptoms don't go away.    Follow up plan  Please make a clinic appointment for follow up with me (RANI WREN) if no improvement in rash.     Thank you for coming to [facility] today.  Lab Testing:  **If you had lab testing today and your results are reassuring or normal they will be mailed to you or sent through [employer]inted within 7 days.   **If the lab tests need quick action we will call you with the results.  The phone number we will call with results is # [phone] (home) . If this is not the best number please call our clinic and change the number.  Medication Refills:  If you need any refills please call your pharmacy and they will contact us.   If you need to  your refill at a new pharmacy, please contact the new pharmacy directly. The new pharmacy will help you get your medications transferred faster.   Scheduling:  If you have any concerns about today's visit or wish to schedule another appointment please call our office during normal business hours [phone] (8-5:00 M-F)  If a referral was made to a [facility] and you don't get a call from central scheduling please call [phone].  If a Mammogram was ordered for you at [facility] call [phone] to schedule or change your appointment.  If you had an XRay/CT/Ultrasound/MRI ordered the number is [phone] to schedule or change your radiology appointment.   Medical Concerns:  If you have urgent medical concerns please call [phone] at any time of the day.

## 2018-03-15 ENCOUNTER — HOSPITAL ENCOUNTER (EMERGENCY)
Facility: CLINIC | Age: 33
Discharge: HOME | End: 2018-03-15
Attending: FAMILY MEDICINE | Admitting: FAMILY MEDICINE
Payer: COMMERCIAL

## 2018-03-15 VITALS
RESPIRATION RATE: 14 BRPM | BODY MASS INDEX: 26.74 KG/M2 | SYSTOLIC BLOOD PRESSURE: 116 MMHG | WEIGHT: 157 LBS | HEART RATE: 62 BPM | TEMPERATURE: 97 F | OXYGEN SATURATION: 99 % | DIASTOLIC BLOOD PRESSURE: 68 MMHG

## 2018-03-15 DIAGNOSIS — G44.209 ACUTE NON INTRACTABLE TENSION-TYPE HEADACHE: ICD-10-CM

## 2018-03-15 PROCEDURE — 99284 EMERGENCY DEPT VISIT MOD MDM: CPT | Mod: Z6 | Performed by: FAMILY MEDICINE

## 2018-03-15 PROCEDURE — 25000132 ZZH RX MED GY IP 250 OP 250 PS 637: Performed by: FAMILY MEDICINE

## 2018-03-15 PROCEDURE — 99283 EMERGENCY DEPT VISIT LOW MDM: CPT | Performed by: FAMILY MEDICINE

## 2018-03-15 RX ORDER — IBUPROFEN 800 MG/1
800 TABLET, FILM COATED ORAL EVERY 8 HOURS PRN
Qty: 20 TABLET | Refills: 0 | Status: SHIPPED | OUTPATIENT
Start: 2018-03-15 | End: 2018-03-23

## 2018-03-15 RX ORDER — IBUPROFEN 800 MG/1
800 TABLET, FILM COATED ORAL ONCE
Status: COMPLETED | OUTPATIENT
Start: 2018-03-15 | End: 2018-03-15

## 2018-03-15 RX ORDER — CYCLOBENZAPRINE HCL 10 MG
5-10 TABLET ORAL 3 TIMES DAILY PRN
Qty: 20 TABLET | Refills: 0 | Status: SHIPPED | OUTPATIENT
Start: 2018-03-15 | End: 2018-12-06

## 2018-03-15 RX ADMIN — IBUPROFEN 800 MG: 800 TABLET, FILM COATED ORAL at 14:16

## 2018-03-15 NOTE — LETTER
March 15, 2018      To Whom It May Concern:      Trina Rooney was seen in our Emergency Department today, 03/15/18.  I expect her condition to improve over the next 2-3 days.  She may return to work/school when improved.    Sincerely,        Jamie May MD

## 2018-03-15 NOTE — ED PROVIDER NOTES
History     Chief Complaint   Patient presents with     Headache     pt states head ache for last 2-3 days, also some shoulder tightness, pt staes it may be from job which has a lot of lifiing over above shoulders.     MELINA Rooney is a 32 year old female who presents with 3 day history of biparietal headache which radiates into her right shoulder and right neck pain.  Patient states her headache came on gradually and has progressively worsened.  It is difficult for her to sleep.  She does a lot of lifting at work including overhead lifting and believe she may have strained her shoulder and neck in the area of the right trapezius about a week ago.  Gradually this developed into a headache as well.  She denies any visual change, difficulty with concentration, difficulty with speech or swallowing.  She denies any numbness, weakness or tingling of her face trunk or extremities, no trouble with balance.  No trouble with coordination.  She does live with other persons and no others complaining of a headache.  She has not traveled.  There has been no head trauma.  She has not tried any medications.    I have reviewed the Medications, Allergies, Past Medical and Surgical History, and Social History in the Epic system.    Review of Systems  All other systems were reviewed and are negative    Physical Exam   BP: 116/68  Pulse: 62  Temp: 97  F (36.1  C)  Resp: 14  Weight: 71.2 kg (157 lb)  SpO2: 99 %      Physical Exam   Constitutional: She is oriented to person, place, and time. She appears well-developed and well-nourished.   HENT:   Head: Normocephalic and atraumatic.   Mouth/Throat: Oropharynx is clear and moist.   Eyes: EOM are normal. Pupils are equal, round, and reactive to light.   Neck: Normal range of motion and phonation normal. Muscular tenderness present. No spinous process tenderness present. No tracheal deviation present. No thyromegaly present.       Cardiovascular: Normal rate, regular rhythm, normal  heart sounds and intact distal pulses.  Exam reveals no gallop and no friction rub.    No murmur heard.  Pulmonary/Chest: Effort normal and breath sounds normal. She exhibits no tenderness.   Abdominal: Soft. Bowel sounds are normal. She exhibits no distension and no mass. There is no tenderness.   Musculoskeletal: She exhibits no edema or tenderness.   Neurological: She is alert and oriented to person, place, and time. She has normal strength. No cranial nerve deficit or sensory deficit. Coordination and gait normal.   Reflex Scores:       Tricep reflexes are 2+ on the right side and 2+ on the left side.       Bicep reflexes are 2+ on the right side and 2+ on the left side.       Brachioradialis reflexes are 2+ on the right side and 2+ on the left side.  Skin: Skin is warm and dry. No rash noted.   Psychiatric: She has a normal mood and affect. Her behavior is normal.   Nursing note and vitals reviewed.      ED Course     ED Course     Procedures             Critical Care time:  none             Labs Ordered and Resulted from Time of ED Arrival Up to the Time of Departure from the ED - No data to display         Assessments & Plan (with Medical Decision Making)   Otherwise healthy 32-year-old woman presenting with a biparietal headache of 3 days' duration, gradual in onset, and not associated with any infectious or neurologic complaints or trauma.  Differential diagnosis considered includes life threatening causes such as subarachnoid hemorrhage, CNS neoplasm, meningitis, carbon monoxide poisoning,cerebral venous thrombosis, pseudotumor cerebri, arterial dissection, temporal arteritis.   On exam she has normal vital signs, normal mental status, normal funduscopic exam with normal venous pulsations, supple neck, normal neurologic exam, otherwise normal physical exam.  She does have tenderness in the right trapezius muscle but no bony midline tenderness.  The patient is less than 40, has no neck stiffness, no  associated loss of consciousness, was not a thunderclap headache, does not come on during exertion, has a supple neck and a normal neurologic examination.  Based on my clinical judgment and the [place] subarachnoid hemorrhage rule I feel subarachnoid is essentially ruled out at this time.  There is no history to support infectious cause, and no focal deficits, no history to support carbon monoxide poisoning, nothing which would support any the aforementioned life-threatening causes of headache.  Her clinical presentation and exam are most consistent with an acute non-intractable muscle tension headache.  We will treat symptomatically.  Discussed expected course, need for follow up, and indications for return with the patient.  See discharge instructions.      I have reviewed the nursing notes.    I have reviewed the findings, diagnosis, plan and need for follow up with the patient.    Discharge Medication List as of 3/15/2018  2:34 PM      START taking these medications    Details   cyclobenzaprine (FLEXERIL) 10 MG tablet Take 0.5-1 tablets (5-10 mg) by mouth 3 times daily as needed for muscle spasms, Disp-20 tablet, R-0, Local Print      ibuprofen (ADVIL/MOTRIN) 800 MG tablet Take 1 tablet (800 mg) by mouth every 8 hours as needed for moderate pain, Disp-20 tablet, R-0, Local Print             Final diagnoses:   Acute non intractable tension-type headache       3/15/2018   [facility], [place], EMERGENCY DEPARTMENT     Jamie May MD  03/15/18 7260

## 2018-03-15 NOTE — ED AVS SNAPSHOT
[facility], [place], Emergency Department    [address]    [place] [address]    Phone:  [phone]    Fax:  [phone]                                       Trina Rooney   MRN: [id number]    Department:  [facility], Emergency Department   Date of Visit:  3/15/2018           After Visit Summary Signature Page     I have received my discharge instructions, and my questions have been answered. I have discussed any challenges I see with this plan with the nurse or doctor.    ..........................................................................................................................................  Patient/Patient Representative Signature      ..........................................................................................................................................  Patient Representative Print Name and Relationship to Patient    ..................................................               ................................................  Date                                            Time    ..........................................................................................................................................  Reviewed by Signature/Title    ...................................................              ..............................................  Date                                                            Time

## 2018-03-15 NOTE — DISCHARGE INSTRUCTIONS
"Thank you for choosing [facility].     Please closely monitor for further symptoms. Return to the Emergency Department if you develop any new or worsening signs or symptoms.    If you received any opiate pain medications or sedatives during your visit, please do not drive for at least 8 hours.     Labs, cultures or final xray interpretations may still need to be reviewed.  We will call you if your plan of care needs to be changed.    Please follow up with your primary care physician or clinic 3-5 days if not resolved.    * Tension Headache    Muscle Tension Headache (also called \"stress headache\") is a very common cause of head pain. Under stress, some people tense the muscles of their shoulder, neck and scalp without knowing it. If this lasts long enough, a headache can occur. These headaches can be very painful and last for hours or even days.  Home Care:    If you were given pain medicine for this headache, do not drive yourself home. Arrange for a ride, instead. When you get home, try to sleep. You should feel much better when you wake up.    Heat to the back of your neck may relieve neck spasm.    Drink only clear liquids or eat a very light diet to avoid nausea/vomiting until symptoms improve.  Preventing Future Headaches    Identify the sources of stress in your life. These may not be obvious! Learn new ways to handle your stress, such as regular exercise, biofeedback, self-hypnosis and meditation. For more information about this, consult your doctor or go to a local bookstore and review the many books and tapes on this subject.    At the first sign of a tension headache, take time out if possible. Remove yourself from the stressful situation, find a quiet comfortable place to sit or lie down and let yourself relax. Heat and deep massage of the tight areas in the neck and shoulders may help reduce muscle spasm. Medicine, such as ibuprofen (Advil or Motrin) or a prescribed muscle " relaxant may be helpful at this point.  Follow Up with your doctor if the headache is not better within the next 24 hours. If you have frequent headaches you should discuss a treatment plan with your primary care doctor. Ask if you can have medicine to take at home the next time you get a bad headache. This may avoid the need for a visit to the emergency department in the future. Poorly controlled chronic headaches may require a referral to a neurologist (headache specialist).  Call your Doctor Or Get Prompt Medical Attention if any of the following occur:    Worsening of your head pain or no improvement within 24 hours    Repeated vomiting (unable to keep liquids down)    Fever of 100.4 F (38.0 C) or higher, or as directed by your healthcare provider    Stiff neck    Extreme drowsiness, confusion or fainting    Dizziness, vertigo (dizziness with spinning sensation)    Weakness of an arm or leg or one side of the face    Difficulty with speech or vision    1324-8592 [employer]. [address] [place] [address]. All rights reserved. This information is not intended as a substitute for professional medical care. Always follow your healthcare professional's instructions.  This information has been modified by your health care provider with permission from the publisher.          Self-Care for Headaches  Most headaches aren't serious and can be relieved with self-care. But some headaches may be a sign of another health problem like eye trouble or high blood pressure. To find the best treatment, learn what kind of headaches you get. For tension headaches, self-care will usually help. To treat migraines, ask your healthcare provider for advice. It is also possible to get both tension and migraine headaches. Self-care involves relieving the pain and avoiding headache  triggers  if you can.    Ways to reduce pain and tension  Try these steps:    Apply a cold compress or ice pack to the pain  "site.    Drink fluids. If nausea makes it hard to drink, try sucking on ice.    Rest. Protect yourself from bright light and loud noises.    Calm your emotions by imagining a peaceful scene.    Massage tight neck, shoulder, and head muscles.    To relax muscles, soak in a hot bath or use a hot shower.  Use medicines  Aspirin or aspirin substitutes, such as ibuprofen and acetaminophen, can relieve headache. Remember: Never give aspirin to anyone 18 years old or younger because of the risk of developing Reye syndrome. Use pain medicines only when necessary.  Track your headaches  Keeping a headache diary can help you and your healthcare provider identify what's causing your headaches:    Note when each headache happens.    Identify your activities and the foods you've eaten 6 to 8 hours before the headache began.    Look for any trends or \"triggers.\"  Signs of tension headache  Any of the following can be signs:    Dull pain or feeling of pressure in a tight band around your head    Pain in your neck or shoulders    Headache without a definite beginning or end    Headache after an activity such as driving or working on a computer  Signs of migraine  Any of the following can be signs:    Throbbing pain on one or both sides of your head    Nausea or vomiting    Extreme sensitivity to light, sound, and smells    Bright spots, flashes, or other visual changes    Pain or nausea so severe that you can't continue your daily activities  Call your healthcare provider   If you have any of the following symptoms, contact your healthcare provider:    A headache that lingers after a recent injury or bump to the head.    A fever with a stiff neck or pain when you bend your head toward your chest.    A headache along with slurred speech, changes in your vision, or numbness or weakness in your arms or legs.    A headache for longer than 3 days.    Frequent headaches, especially in the morning.    Headaches with seizures     Seek " "immediate medical attention if you have a headache that you would call \"the worst headache you have ever had.\"   Date Last Reviewed: 10/4/2015    4616-2267 [employer]. [address] [place] [address], [place] [address]. All rights reserved. This information is not intended as a substitute for professional medical care. Always follow your healthcare professional's instructions.          "

## 2018-03-15 NOTE — ED AVS SNAPSHOT
" [facility], Emergency Department    [address]    [place] [address]    Phone:  [phone]    Fax:  [phone]                                       Trina Rooney   MRN: [id number]    Department:  [facility], Emergency Department   Date of Visit:  3/15/2018           Patient Information     Date Of Birth          1985        Your diagnoses for this visit were:     Acute non intractable tension-type headache        You were seen by Jamie May MD.        Discharge Instructions       Thank you for choosing [facility].     Please closely monitor for further symptoms. Return to the Emergency Department if you develop any new or worsening signs or symptoms.    If you received any opiate pain medications or sedatives during your visit, please do not drive for at least 8 hours.     Labs, cultures or final xray interpretations may still need to be reviewed.  We will call you if your plan of care needs to be changed.    Please follow up with your primary care physician or clinic 3-5 days if not resolved.    * Tension Headache    Muscle Tension Headache (also called \"stress headache\") is a very common cause of head pain. Under stress, some people tense the muscles of their shoulder, neck and scalp without knowing it. If this lasts long enough, a headache can occur. These headaches can be very painful and last for hours or even days.  Home Care:    If you were given pain medicine for this headache, do not drive yourself home. Arrange for a ride, instead. When you get home, try to sleep. You should feel much better when you wake up.    Heat to the back of your neck may relieve neck spasm.    Drink only clear liquids or eat a very light diet to avoid nausea/vomiting until symptoms improve.  Preventing Future Headaches    Identify the sources of stress in your life. These may not be obvious! Learn new ways to handle your stress, such as regular exercise, biofeedback, " self-hypnosis and meditation. For more information about this, consult your doctor or go to a local bookstore and review the many books and tapes on this subject.    At the first sign of a tension headache, take time out if possible. Remove yourself from the stressful situation, find a quiet comfortable place to sit or lie down and let yourself relax. Heat and deep massage of the tight areas in the neck and shoulders may help reduce muscle spasm. Medicine, such as ibuprofen (Advil or Motrin) or a prescribed muscle relaxant may be helpful at this point.  Follow Up with your doctor if the headache is not better within the next 24 hours. If you have frequent headaches you should discuss a treatment plan with your primary care doctor. Ask if you can have medicine to take at home the next time you get a bad headache. This may avoid the need for a visit to the emergency department in the future. Poorly controlled chronic headaches may require a referral to a neurologist (headache specialist).  Call your Doctor Or Get Prompt Medical Attention if any of the following occur:    Worsening of your head pain or no improvement within 24 hours    Repeated vomiting (unable to keep liquids down)    Fever of 100.4 F (38.0 C) or higher, or as directed by your healthcare provider    Stiff neck    Extreme drowsiness, confusion or fainting    Dizziness, vertigo (dizziness with spinning sensation)    Weakness of an arm or leg or one side of the face    Difficulty with speech or vision    6701-8724 [employer]. [address] [place] [address], [place] [address]. All rights reserved. This information is not intended as a substitute for professional medical care. Always follow your healthcare professional's instructions.  This information has been modified by your health care provider with permission from the publisher.          Self-Care for Headaches  Most headaches aren't serious and can be relieved with self-care. But some  "headaches may be a sign of another health problem like eye trouble or high blood pressure. To find the best treatment, learn what kind of headaches you get. For tension headaches, self-care will usually help. To treat migraines, ask your healthcare provider for advice. It is also possible to get both tension and migraine headaches. Self-care involves relieving the pain and avoiding headache  triggers  if you can.    Ways to reduce pain and tension  Try these steps:    Apply a cold compress or ice pack to the pain site.    Drink fluids. If nausea makes it hard to drink, try sucking on ice.    Rest. Protect yourself from bright light and loud noises.    Calm your emotions by imagining a peaceful scene.    Massage tight neck, shoulder, and head muscles.    To relax muscles, soak in a hot bath or use a hot shower.  Use medicines  Aspirin or aspirin substitutes, such as ibuprofen and acetaminophen, can relieve headache. Remember: Never give aspirin to anyone 18 years old or younger because of the risk of developing Reye syndrome. Use pain medicines only when necessary.  Track your headaches  Keeping a headache diary can help you and your healthcare provider identify what's causing your headaches:    Note when each headache happens.    Identify your activities and the foods you've eaten 6 to 8 hours before the headache began.    Look for any trends or \"triggers.\"  Signs of tension headache  Any of the following can be signs:    Dull pain or feeling of pressure in a tight band around your head    Pain in your neck or shoulders    Headache without a definite beginning or end    Headache after an activity such as driving or working on a computer  Signs of migraine  Any of the following can be signs:    Throbbing pain on one or both sides of your head    Nausea or vomiting    Extreme sensitivity to light, sound, and smells    Bright spots, flashes, or other visual changes    Pain or nausea so severe that you can't continue " "your daily activities  Call your healthcare provider   If you have any of the following symptoms, contact your healthcare provider:    A headache that lingers after a recent injury or bump to the head.    A fever with a stiff neck or pain when you bend your head toward your chest.    A headache along with slurred speech, changes in your vision, or numbness or weakness in your arms or legs.    A headache for longer than 3 days.    Frequent headaches, especially in the morning.    Headaches with seizures     Seek immediate medical attention if you have a headache that you would call \"the worst headache you have ever had.\"   Date Last Reviewed: 10/4/2015    1772-3546 [employer]. [address] [place] [address], [place] [address]. All rights reserved. This information is not intended as a substitute for professional medical care. Always follow your healthcare professional's instructions.            24 Hour Appointment Hotline       To make an appointment at any [facility], call [phone] ([phone]). If you don't have a family doctor or clinic, we will help you find one. [facility] clinics are conveniently located to serve the needs of you and your family.             Review of your medicines      START taking        Dose / Directions Last dose taken    cyclobenzaprine 10 MG tablet   Commonly known as:  FLEXERIL   Dose:  5-10 mg   Quantity:  20 tablet        Take 0.5-1 tablets (5-10 mg) by mouth 3 times daily as needed for muscle spasms   Refills:  0        ibuprofen 800 MG tablet   Commonly known as:  ADVIL/MOTRIN   Dose:  800 mg   Quantity:  20 tablet        Take 1 tablet (800 mg) by mouth every 8 hours as needed for moderate pain   Refills:  0          Our records show that you are taking the medicines listed below. If these are incorrect, please call your family doctor or clinic.        Dose / Directions Last dose taken    adapalene 0.3 % gel   Commonly known as:  DIFFERIN   Quantity:  59 g        " Apply topically At Bedtime   Refills:  4        clindamycin 1 % solution   Commonly known as:  CLEOCIN T        Apply to face BID   Refills:  0        clotrimazole 1 % cream   Commonly known as:  LOTRIMIN   Quantity:  15 g        Apply topically 2 times daily   Refills:  1        Ovulation Prediction Test Test   Dose:  1 strip   Quantity:  15 each        1 strip by In Vitro route daily Follow the instructions, and start testing your ovulation on day 9 after your menstrual period start   Refills:  1        Prenatal Vitamin 27-0.8 MG Tabs   Dose:  1 tablet   Quantity:  90 tablet        Take 1 tablet by mouth daily   Refills:  3        vitamin D 2000 UNITS tablet   Dose:  2000 Units        Take 2,000 Units by mouth   Refills:  0                Prescriptions were sent or printed at these locations (2 Prescriptions)                   Other Prescriptions                Printed at Department/Unit printer (2 of 2)         cyclobenzaprine (FLEXERIL) 10 MG tablet               ibuprofen (ADVIL/MOTRIN) 800 MG tablet                Orders Needing Specimen Collection     None      Pending Results     No orders found from 3/13/2018 to 3/16/2018.            Pending Culture Results     No orders found from 3/13/2018 to 3/16/2018.            Pending Results Instructions     If you had any lab results that were not finalized at the time of your Discharge, you can call the ED Lab Result RN at [phone]. You will be contacted by this team for any positive Lab results or changes in treatment. The nurses are available 7 days a week from 10A to 6:30P.  You can leave a message 24 hours per day and they will return your call.        Thank you for choosing [facility]       Thank you for choosing [facility] for your care. Our goal is always to provide you with excellent care. Hearing back from our patients is one way we can continue to improve our services. Please take a few minutes to complete the written survey that you may receive in the  "mail after you visit with us. Thank you!        [email or web address] Information     [employer] lets you send messages to your doctor, view your test results, renew your prescriptions, schedule appointments and more. To sign up, go to [email or web address] . Click on \"Log in\" on the left side of the screen, which will take you to the Welcome page. Then click on \"Sign up Now\" on the right side of the page.     You will be asked to enter the access code listed below, as well as some personal information. Please follow the directions to create your username and password.     Your access code is: [email or web address]  Expires: 2018  2:33 PM     Your access code will  in 90 days. If you need help or a new code, please call your [facility] clinic or [phone].        Care EveryWhere ID     This is your Care EveryWhere ID. This could be used by other organizations to access your [facility] medical records  [id number]        Equal Access to Services     ALDEN CHAVARRIA : Hadii aad ku hadasho Sobooali, waaxda luqadaha, qaybta kaalmada adeegyaeri, dotty roth . So [facility] [phone].    ATENCIÓN: Si habla español, tiene a sam disposición servicios gratuitos de asistencia lingüística. Llame al [phone].    We comply with applicable federal civil rights laws and [place] laws. We do not discriminate on the basis of race, color, national origin, age, disability, sex, sexual orientation, or gender identity.            After Visit Summary       This is your record. Keep this with you and show to your community pharmacist(s) and doctor(s) at your next visit.                  "

## 2018-06-04 ENCOUNTER — HEALTH MAINTENANCE LETTER (OUTPATIENT)
Age: 33
End: 2018-06-04

## 2018-08-09 LAB
HBV SURFACE AG SERPL QL IA: NEGATIVE
HIV 1+2 AB+HIV1 P24 AG SERPL QL IA: NEGATIVE

## 2018-08-10 LAB — RUBELLA ANTIBODY IGG QUANTITATIVE: NORMAL IU/ML

## 2018-11-26 LAB — GLU GEST SCREEN 1HR 50G: 106

## 2018-11-27 LAB
T PALLIDUM IGG SER QL: NEGATIVE
TREPONEMA ANTIBODIES: NEGATIVE

## 2018-12-06 ENCOUNTER — HOSPITAL ENCOUNTER (EMERGENCY)
Facility: CLINIC | Age: 33
Discharge: HOME | End: 2018-12-06
Attending: EMERGENCY MEDICINE | Admitting: EMERGENCY MEDICINE
Payer: COMMERCIAL

## 2018-12-06 VITALS
OXYGEN SATURATION: 98 % | DIASTOLIC BLOOD PRESSURE: 80 MMHG | BODY MASS INDEX: 30.17 KG/M2 | WEIGHT: 177.13 LBS | SYSTOLIC BLOOD PRESSURE: 110 MMHG | TEMPERATURE: 98.1 F | HEART RATE: 82 BPM | RESPIRATION RATE: 18 BRPM

## 2018-12-06 DIAGNOSIS — G44.219 EPISODIC TENSION-TYPE HEADACHE, NOT INTRACTABLE: ICD-10-CM

## 2018-12-06 DIAGNOSIS — R11.2 NAUSEA AND VOMITING, INTRACTABILITY OF VOMITING NOT SPECIFIED, UNSPECIFIED VOMITING TYPE: ICD-10-CM

## 2018-12-06 DIAGNOSIS — Z3A.22 22 WEEKS GESTATION OF PREGNANCY: ICD-10-CM

## 2018-12-06 LAB
ALBUMIN SERPL-MCNC: 2.7 G/DL (ref 3.4–5)
ALBUMIN UR-MCNC: NEGATIVE MG/DL
ALP SERPL-CCNC: 77 U/L (ref 40–150)
ALT SERPL W P-5'-P-CCNC: 26 U/L (ref 0–50)
ANION GAP SERPL CALCULATED.3IONS-SCNC: 9 MMOL/L (ref 3–14)
APPEARANCE UR: CLEAR
AST SERPL W P-5'-P-CCNC: 22 U/L (ref 0–45)
BASOPHILS # BLD AUTO: 0 10E9/L (ref 0–0.2)
BASOPHILS NFR BLD AUTO: 0.3 %
BILIRUB SERPL-MCNC: 0.2 MG/DL (ref 0.2–1.3)
BILIRUB UR QL STRIP: NEGATIVE
BUN SERPL-MCNC: 9 MG/DL (ref 7–30)
CALCIUM SERPL-MCNC: 8.6 MG/DL (ref 8.5–10.1)
CHLORIDE SERPL-SCNC: 109 MMOL/L (ref 94–109)
CO2 SERPL-SCNC: 22 MMOL/L (ref 20–32)
COLOR UR AUTO: NORMAL
CREAT SERPL-MCNC: 0.42 MG/DL (ref 0.52–1.04)
DIFFERENTIAL METHOD BLD: NORMAL
EOSINOPHIL # BLD AUTO: 0 10E9/L (ref 0–0.7)
EOSINOPHIL NFR BLD AUTO: 0 %
ERYTHROCYTE [DISTWIDTH] IN BLOOD BY AUTOMATED COUNT: 13.3 % (ref 10–15)
GFR SERPL CREATININE-BSD FRML MDRD: >90 ML/MIN/1.7M2
GLUCOSE SERPL-MCNC: 103 MG/DL (ref 70–99)
GLUCOSE UR STRIP-MCNC: NEGATIVE MG/DL
HCT VFR BLD AUTO: 35.8 % (ref 35–47)
HGB BLD-MCNC: 12.1 G/DL (ref 11.7–15.7)
HGB UR QL STRIP: NEGATIVE
IMM GRANULOCYTES # BLD: 0.1 10E9/L (ref 0–0.4)
IMM GRANULOCYTES NFR BLD: 1.2 %
KETONES UR STRIP-MCNC: NEGATIVE MG/DL
LEUKOCYTE ESTERASE UR QL STRIP: NEGATIVE
LIPASE SERPL-CCNC: 110 U/L (ref 73–393)
LYMPHOCYTES # BLD AUTO: 1.1 10E9/L (ref 0.8–5.3)
LYMPHOCYTES NFR BLD AUTO: 16.9 %
MCH RBC QN AUTO: 30.6 PG (ref 26.5–33)
MCHC RBC AUTO-ENTMCNC: 33.8 G/DL (ref 31.5–36.5)
MCV RBC AUTO: 90 FL (ref 78–100)
MONOCYTES # BLD AUTO: 0.9 10E9/L (ref 0–1.3)
MONOCYTES NFR BLD AUTO: 13.3 %
NEUTROPHILS # BLD AUTO: 4.5 10E9/L (ref 1.6–8.3)
NEUTROPHILS NFR BLD AUTO: 68.3 %
NITRATE UR QL: NEGATIVE
NRBC # BLD AUTO: 0 10*3/UL
NRBC BLD AUTO-RTO: 0 /100
PH UR STRIP: 6.5 PH (ref 5–7)
PLATELET # BLD AUTO: 172 10E9/L (ref 150–450)
POTASSIUM SERPL-SCNC: 3.8 MMOL/L (ref 3.4–5.3)
PROT SERPL-MCNC: 6.7 G/DL (ref 6.8–8.8)
RBC # BLD AUTO: 3.96 10E12/L (ref 3.8–5.2)
SODIUM SERPL-SCNC: 140 MMOL/L (ref 133–144)
SOURCE: NORMAL
SP GR UR STRIP: 1.01 (ref 1–1.03)
UROBILINOGEN UR STRIP-MCNC: NORMAL MG/DL (ref 0–2)
WBC # BLD AUTO: 6.6 10E9/L (ref 4–11)

## 2018-12-06 PROCEDURE — 96361 HYDRATE IV INFUSION ADD-ON: CPT | Mod: 59

## 2018-12-06 PROCEDURE — 96374 THER/PROPH/DIAG INJ IV PUSH: CPT

## 2018-12-06 PROCEDURE — 80053 COMPREHEN METABOLIC PANEL: CPT | Performed by: EMERGENCY MEDICINE

## 2018-12-06 PROCEDURE — 99284 EMERGENCY DEPT VISIT MOD MDM: CPT | Mod: 25

## 2018-12-06 PROCEDURE — 85025 COMPLETE CBC W/AUTO DIFF WBC: CPT | Performed by: EMERGENCY MEDICINE

## 2018-12-06 PROCEDURE — 25000128 H RX IP 250 OP 636: Performed by: EMERGENCY MEDICINE

## 2018-12-06 PROCEDURE — 99284 EMERGENCY DEPT VISIT MOD MDM: CPT | Mod: Z6 | Performed by: EMERGENCY MEDICINE

## 2018-12-06 PROCEDURE — 25000132 ZZH RX MED GY IP 250 OP 250 PS 637: Performed by: EMERGENCY MEDICINE

## 2018-12-06 PROCEDURE — 81003 URINALYSIS AUTO W/O SCOPE: CPT | Performed by: EMERGENCY MEDICINE

## 2018-12-06 PROCEDURE — 83690 ASSAY OF LIPASE: CPT | Performed by: EMERGENCY MEDICINE

## 2018-12-06 RX ORDER — SODIUM CHLORIDE 9 MG/ML
1000 INJECTION, SOLUTION INTRAVENOUS CONTINUOUS
Status: DISCONTINUED | OUTPATIENT
Start: 2018-12-06 | End: 2018-12-06 | Stop reason: HOSPADM

## 2018-12-06 RX ORDER — METOCLOPRAMIDE 10 MG/1
10 TABLET ORAL 4 TIMES DAILY PRN
Qty: 30 TABLET | Refills: 1 | Status: ON HOLD | OUTPATIENT
Start: 2018-12-06 | End: 2019-01-25

## 2018-12-06 RX ORDER — ACETAMINOPHEN 325 MG/1
975 TABLET ORAL ONCE
Status: COMPLETED | OUTPATIENT
Start: 2018-12-06 | End: 2018-12-06

## 2018-12-06 RX ORDER — METOCLOPRAMIDE HYDROCHLORIDE 5 MG/ML
10 INJECTION INTRAMUSCULAR; INTRAVENOUS ONCE
Status: COMPLETED | OUTPATIENT
Start: 2018-12-06 | End: 2018-12-06

## 2018-12-06 RX ADMIN — METOCLOPRAMIDE 10 MG: 5 INJECTION, SOLUTION INTRAMUSCULAR; INTRAVENOUS at 11:50

## 2018-12-06 RX ADMIN — SODIUM CHLORIDE 1000 ML: 9 INJECTION, SOLUTION INTRAVENOUS at 12:39

## 2018-12-06 RX ADMIN — ACETAMINOPHEN 975 MG: 325 TABLET, FILM COATED ORAL at 11:53

## 2018-12-06 RX ADMIN — SODIUM CHLORIDE 1000 ML: 9 INJECTION, SOLUTION INTRAVENOUS at 11:44

## 2018-12-06 ASSESSMENT — ENCOUNTER SYMPTOMS
HEADACHES: 1
ABDOMINAL PAIN: 1
NAUSEA: 1
NUMBNESS: 0
DYSURIA: 0
HEMATURIA: 0
WEAKNESS: 0
VOMITING: 1

## 2018-12-06 NOTE — LETTER
December 6, 2018      To Whom It May Concern:      Trina Rooney was seen in our Emergency Department today, 12/06/18.  I expect her condition to improve over the next 1 days.  She may return to work/school when improved.    Sincerely,        Milton Gore, DO

## 2018-12-06 NOTE — LETTER
December 6, 2018      To Whom It May Concern:      Trina Rooney was seen in our Emergency Department today, 12/06/18.  I expect her condition to improve over the next 4 days.  She may return to work/school when improved.    Sincerely,        Milton Gore, DO

## 2018-12-06 NOTE — ED PROVIDER NOTES
History     Chief Complaint   Patient presents with     Nausea & Vomiting     28 weeks pregnant.  Denies vaginal bleeding.  States sometimes loose stool.  States it started last night.     Epistaxis     States she has siome bleeding from left nares.  States she gets nose bleed every morning when she wakes up.     Headache     Complains of right ear pain.  Headache in small spot on top of her head.     MELINA Rooney is a 33 year old otherwise healthy female who is  currently about 28 weeks gestation who presents for evaluation of headache, nausea, and epistaxis. The patient complains of a right-sided headache as well as intermittent epistaxis that has been ongoing for the past one week. She also complains that last night she had the onset of nausea and vomiting with associated epigastric abdominal discomfort. She denies vaginal bleeding or discharge. No dysuria or hematuria. No vision changes, numbness, or weakness. She has not taken any medications for her symptoms at home.       I have reviewed the Medications, Allergies, Past Medical and Surgical History, and Social History in the GLOG system.  History reviewed. No pertinent past medical history.    History reviewed. No pertinent surgical history.    Family History   Problem Relation Age of Onset     Family History Negative Other        Social History   Substance Use Topics     Smoking status: Never Smoker     Smokeless tobacco: Never Used     Alcohol use No       Current Facility-Administered Medications   Medication     sodium chloride 0.9% infusion     Current Outpatient Prescriptions   Medication     metoclopramide (REGLAN) 10 MG tablet     Prenatal Vit-Fe Fumarate-FA (PRENATAL VITAMIN) 27-0.8 MG TABS     Cholecalciferol (VITAMIN D) 2000 UNITS tablet      No Known Allergies    Review of Systems   HENT: Positive for nosebleeds (intermittent x1 week).    Eyes: Negative for visual disturbance.   Gastrointestinal: Positive for abdominal pain  (epigastric), nausea and vomiting.   Genitourinary: Negative for dysuria, hematuria, vaginal bleeding and vaginal discharge.   Neurological: Positive for headaches. Negative for weakness and numbness.   All other systems reviewed and are negative.      Physical Exam   BP: 108/75  Pulse: 82  Heart Rate: 80  Temp: 98  F (36.7  C)  Resp: 18  Weight: 80.3 kg (177 lb 2 oz)  SpO2: 100 %      Physical Exam   Constitutional: She is oriented to person, place, and time. No distress.   HENT:   Head: Atraumatic.   Mouth/Throat: Oropharynx is clear and moist. No oropharyngeal exudate.   Bilateral TMs clear, no evidence of current active nose bleeding.   Eyes: EOM are normal. No scleral icterus.   Neck: Neck supple.   Cardiovascular: Normal rate, regular rhythm and normal heart sounds.    Pulmonary/Chest: Breath sounds normal. No respiratory distress.   Abdominal: Soft. She exhibits no distension.   Mild epigastric tenderness   Musculoskeletal: She exhibits no edema or deformity.   Neurological: She is alert and oriented to person, place, and time. No cranial nerve deficit. Coordination normal.   Skin: Skin is warm and dry. She is not diaphoretic.   Psychiatric: She has a normal mood and affect. Her behavior is normal.       ED Course     ED Course     Procedures       11:16 AM  The patient was seen and examined by Dr. Gore in Room 7.        Labs Ordered and Resulted from Time of ED Arrival Up to the Time of Departure from the ED   COMPREHENSIVE METABOLIC PANEL - Abnormal; Notable for the following:        Result Value    Glucose 103 (*)     Creatinine 0.42 (*)     Albumin 2.7 (*)     Protein Total 6.7 (*)     All other components within normal limits   CBC WITH PLATELETS DIFFERENTIAL   LIPASE   UA MACROSCOPIC WITH REFLEX TO MICRO AND CULTURE   PERIPHERAL IV CATHETER           Results for orders placed or performed during the hospital encounter of 12/06/18   CBC with platelets differential   Result Value Ref Range    WBC 6.6  4.0 - 11.0 10e9/L    RBC Count 3.96 3.8 - 5.2 10e12/L    Hemoglobin 12.1 11.7 - 15.7 g/dL    Hematocrit 35.8 35.0 - 47.0 %    MCV 90 78 - 100 fl    MCH 30.6 26.5 - 33.0 pg    MCHC 33.8 31.5 - 36.5 g/dL    RDW 13.3 10.0 - 15.0 %    Platelet Count 172 150 - 450 10e9/L    Diff Method Automated Method     % Neutrophils 68.3 %    % Lymphocytes 16.9 %    % Monocytes 13.3 %    % Eosinophils 0.0 %    % Basophils 0.3 %    % Immature Granulocytes 1.2 %    Nucleated RBCs 0 0 /100    Absolute Neutrophil 4.5 1.6 - 8.3 10e9/L    Absolute Lymphocytes 1.1 0.8 - 5.3 10e9/L    Absolute Monocytes 0.9 0.0 - 1.3 10e9/L    Absolute Eosinophils 0.0 0.0 - 0.7 10e9/L    Absolute Basophils 0.0 0.0 - 0.2 10e9/L    Abs Immature Granulocytes 0.1 0 - 0.4 10e9/L    Absolute Nucleated RBC 0.0    Comprehensive metabolic panel   Result Value Ref Range    Sodium 140 133 - 144 mmol/L    Potassium 3.8 3.4 - 5.3 mmol/L    Chloride 109 94 - 109 mmol/L    Carbon Dioxide 22 20 - 32 mmol/L    Anion Gap 9 3 - 14 mmol/L    Glucose 103 (H) 70 - 99 mg/dL    Urea Nitrogen 9 7 - 30 mg/dL    Creatinine 0.42 (L) 0.52 - 1.04 mg/dL    GFR Estimate >90 >60 mL/min/1.7m2    GFR Estimate If Black >90 >60 mL/min/1.7m2    Calcium 8.6 8.5 - 10.1 mg/dL    Bilirubin Total 0.2 0.2 - 1.3 mg/dL    Albumin 2.7 (L) 3.4 - 5.0 g/dL    Protein Total 6.7 (L) 6.8 - 8.8 g/dL    Alkaline Phosphatase 77 40 - 150 U/L    ALT 26 0 - 50 U/L    AST 22 0 - 45 U/L   Lipase   Result Value Ref Range    Lipase 110 73 - 393 U/L   UA reflex to Microscopic and Culture   Result Value Ref Range    Color Urine Light Yellow     Appearance Urine Clear     Glucose Urine Negative NEG^Negative mg/dL    Bilirubin Urine Negative NEG^Negative    Ketones Urine Negative NEG^Negative mg/dL    Specific Gravity Urine 1.014 1.003 - 1.035    Blood Urine Negative NEG^Negative    pH Urine 6.5 5.0 - 7.0 pH    Protein Albumin Urine Negative NEG^Negative mg/dL    Urobilinogen mg/dL Normal 0.0 - 2.0 mg/dL    Nitrite Urine  Negative NEG^Negative    Leukocyte Esterase Urine Negative NEG^Negative    Source Clean catch urine          Assessments & Plan (with Medical Decision Making)   33-year-old female currently approximately 22 weeks pregnant presents with a chief complaint of epigastric pain and headache.  She did not tried anything for her symptoms at home.  She had a normal neurological examination.  Tylenol resolved her headache.  She was given a dose of Reglan who resolved her nausea.  Patient has normal vital signs here.  No hypertension.  Urinalysis is unremarkable with no evidence of infection or protein in her urine.  I do not have concern for preeclampsia.  Labs are otherwise unremarkable.  Patient is feeling much better at this point.  Repeat abdominal exam elicits no tenderness.  Recommend she follow-up with her OB and return to us for any symptoms develop as needed.    I have reviewed the nursing notes.    I have reviewed the findings, diagnosis, plan and need for follow up with the patient.    Discharge Medication List as of 12/6/2018  1:04 PM      START taking these medications    Details   metoclopramide (REGLAN) 10 MG tablet Take 1 tablet (10 mg) by mouth 4 times daily as needed (Nausea and vomiting), Disp-30 tablet, R-1, Local Print             Final diagnoses:   Nausea and vomiting, intractability of vomiting not specified, unspecified vomiting type   Episodic tension-type headache, not intractable   I, Lavinia Veliz, am serving as a trained medical scribe to document services personally performed by Milton Gore MD, based on the provider's statements to me.   Milton CARLSON MD, was physically present and have reviewed and verified the accuracy of this note documented by Lavinia Veliz.      12/6/2018   [facility], [place], EMERGENCY DEPARTMENT     Milton Gore,   12/06/18 7725

## 2018-12-06 NOTE — DISCHARGE INSTRUCTIONS
Follow-up with your OB physician.  Return to the emergency department for any new or worsening symptoms.  Please take over-the-counter Tylenol for any further headaches.    Managing Tension-type Headache Symptoms  A tension-type headache can develop slowly. Being aware of the symptoms helps you recognize a headache early. Then you can act to reduce pain and relieve tension. Methods for relieving your symptoms include self-care and medicine.    Tension-type symptoms  The earlier you recognize the symptoms of a tension-type headache, the easier it is to treat. Tension-type headaches may:    Start with fatigue, tension, or pain in the neck and shoulders    Feel like a band around the head    Be concentrated in the temple, the back of the head, behind the eyes, or in the face    Come and go, or last for days, weeks, or even longer    Involve referred pain -- this means that the area that hurts may not be where the problem starts  Self-care during a tension-type headache  When you have a tension-type headache, there are things you can do to relax, loosen muscles, and reduce the pain:    Brush your scalp lightly with a soft hairbrush.    Give yourself a massage. Knead the muscles running from your shoulders up the back of your skull. Or ask a friend to gently massage your neck and shoulders.    Use an ice pack. Wrap a thin cloth around a cold pack, a cold can of soda, or a bag of frozen vegetables. Apply this directly to the place where you feel pain (such as your neck or temples).    Use moist heat to relax your muscles. Take a warm shower or bath. Or drape a warm, moist towel around your neck and shoulders.  Relieving pain and tension  Over-the-counter or prescription medicine can help relieve pain. Another way to reduce your pain is to use relaxation techniques to loosen tight muscles.  Medicine  Medicine used for tension-type headaches include the following:    NSAIDs (nonsteroidal anti-inflammatories), such as  aspirin and ibuprofen, relieve inflammation and help block pain signals.    Acetaminophen treats pain, and some formulations contain caffeine.     Muscle relaxants can reduce painful muscle contractions.  Taking medicine safely  Be aware that:    Taking analgesics (pain relievers) or drinking too much coffee may lead to rebound headaches (frequent or severe headaches that can happen if you miss a dose of medication), so take pain medicines only as needed. If you think you have these headaches, contact your healthcare provider.    Taking too much medicine can cause sleep problems or stomach upset. Some over-the-counter headache medications may contain caffeine. These may disrupt sleep and worsen pain.  Relaxation techniques  A , class, book, or tape may help you learn these techniques. One or more of these methods may work for you:    Deep breathing. Slow, calm, deep breathing can help you relax. Breathe in for a count of 5 or more. Then slowly let the breath out.    Visualization. Imagining a peaceful, secure scene can give you a sense of control over your body and surroundings.    Progressive relaxation. This is done by tightening and then releasing muscle groups. Start at the top of your head and work your way down your body. Tighten each muscle group for 5 to 10 seconds. Then release the muscle group for the same amount of time.    Biofeedback. This is a type of training in which you learn to control certain physical functions and responses. This helps you learn to reduce muscle tension.  Date Last Reviewed: 4/1/2018 2000-2018 [employer]. [address] [place] [address], [place] [address]. All rights reserved. This information is not intended as a substitute for professional medical care. Always follow your healthcare professional's instructions.

## 2018-12-06 NOTE — ED AVS SNAPSHOT
[facility], [place], Emergency Department    [address]    [place] [address]    Phone:  [phone]    Fax:  [phone]                                       Trina Rooney   MRN: [id number]    Department:  [facility], Emergency Department   Date of Visit:  12/6/2018           After Visit Summary Signature Page     I have received my discharge instructions, and my questions have been answered. I have discussed any challenges I see with this plan with the nurse or doctor.    ..........................................................................................................................................  Patient/Patient Representative Signature      ..........................................................................................................................................  Patient Representative Print Name and Relationship to Patient    ..................................................               ................................................  Date                                   Time    ..........................................................................................................................................  Reviewed by Signature/Title    ...................................................              ..............................................  Date                                               Time          22EPIC Rev 08/18

## 2018-12-06 NOTE — ED AVS SNAPSHOT
[facility], Emergency Department    [address]    [place] [address]    Phone:  [phone]    Fax:  [phone]                                       Trina Rooney   MRN: [id number]    Department:  [facility], Emergency Department   Date of Visit:  12/6/2018           Patient Information     Date Of Birth          1985        Your diagnoses for this visit were:     Nausea and vomiting, intractability of vomiting not specified, unspecified vomiting type     Episodic tension-type headache, not intractable        You were seen by Milton Gore DO.        Discharge Instructions         Follow-up with your OB physician.  Return to the emergency department for any new or worsening symptoms.  Please take over-the-counter Tylenol for any further headaches.    Managing Tension-type Headache Symptoms  A tension-type headache can develop slowly. Being aware of the symptoms helps you recognize a headache early. Then you can act to reduce pain and relieve tension. Methods for relieving your symptoms include self-care and medicine.    Tension-type symptoms  The earlier you recognize the symptoms of a tension-type headache, the easier it is to treat. Tension-type headaches may:    Start with fatigue, tension, or pain in the neck and shoulders    Feel like a band around the head    Be concentrated in the temple, the back of the head, behind the eyes, or in the face    Come and go, or last for days, weeks, or even longer    Involve referred pain -- this means that the area that hurts may not be where the problem starts  Self-care during a tension-type headache  When you have a tension-type headache, there are things you can do to relax, loosen muscles, and reduce the pain:    Brush your scalp lightly with a soft hairbrush.    Give yourself a massage. Knead the muscles running from your shoulders up the back of your skull. Or ask a friend to gently massage your neck and shoulders.    Use an ice  pack. Wrap a thin cloth around a cold pack, a cold can of soda, or a bag of frozen vegetables. Apply this directly to the place where you feel pain (such as your neck or temples).    Use moist heat to relax your muscles. Take a warm shower or bath. Or drape a warm, moist towel around your neck and shoulders.  Relieving pain and tension  Over-the-counter or prescription medicine can help relieve pain. Another way to reduce your pain is to use relaxation techniques to loosen tight muscles.  Medicine  Medicine used for tension-type headaches include the following:    NSAIDs (nonsteroidal anti-inflammatories), such as aspirin and ibuprofen, relieve inflammation and help block pain signals.    Acetaminophen treats pain, and some formulations contain caffeine.     Muscle relaxants can reduce painful muscle contractions.  Taking medicine safely  Be aware that:    Taking analgesics (pain relievers) or drinking too much coffee may lead to rebound headaches (frequent or severe headaches that can happen if you miss a dose of medication), so take pain medicines only as needed. If you think you have these headaches, contact your healthcare provider.    Taking too much medicine can cause sleep problems or stomach upset. Some over-the-counter headache medications may contain caffeine. These may disrupt sleep and worsen pain.  Relaxation techniques  A , class, book, or tape may help you learn these techniques. One or more of these methods may work for you:    Deep breathing. Slow, calm, deep breathing can help you relax. Breathe in for a count of 5 or more. Then slowly let the breath out.    Visualization. Imagining a peaceful, secure scene can give you a sense of control over your body and surroundings.    Progressive relaxation. This is done by tightening and then releasing muscle groups. Start at the top of your head and work your way down your body. Tighten each muscle group for 5 to 10 seconds. Then release the muscle  group for the same amount of time.    Biofeedback. This is a type of training in which you learn to control certain physical functions and responses. This helps you learn to reduce muscle tension.  Date Last Reviewed: 4/1/2018 2000-2018 [employer]. [address] [place] [address]. All rights reserved. This information is not intended as a substitute for professional medical care. Always follow your healthcare professional's instructions.          24 Hour Appointment Hotline       To make an appointment at any [facility], call [phone] ([phone]). If you don't have a family doctor or clinic, we will help you find one. [facility] are conveniently located to serve the needs of you and your family.             Review of your medicines      START taking        Dose / Directions Last dose taken    metoclopramide 10 MG tablet   Commonly known as:  REGLAN   Dose:  10 mg   Quantity:  30 tablet        Take 1 tablet (10 mg) by mouth 4 times daily as needed (Nausea and vomiting)   Refills:  1          Our records show that you are taking the medicines listed below. If these are incorrect, please call your family doctor or clinic.        Dose / Directions Last dose taken    Prenatal Vitamin 27-0.8 MG Tabs   Dose:  1 tablet   Quantity:  90 tablet        Take 1 tablet by mouth daily   Refills:  3        vitamin D3 2000 units tablet   Commonly known as:  CHOLECALCIFEROL   Dose:  2000 Units        Take 2,000 Units by mouth   Refills:  0                Prescriptions were sent or printed at these locations (1 Prescription)                   Other Prescriptions                Printed at Department/Unit printer (1 of 1)         metoclopramide (REGLAN) 10 MG tablet                Procedures and tests performed during your visit     CBC with platelets differential    Comprehensive metabolic panel    Lipase    Peripheral IV catheter    UA reflex to Microscopic and Culture      Orders  "Needing Specimen Collection     None      Pending Results     No orders found from 2018 to 2018.            Pending Culture Results     No orders found from 2018 to 2018.            Pending Results Instructions     If you had any lab results that were not finalized at the time of your Discharge, you can call the ED Lab Result RN at [phone]. You will be contacted by this team for any positive Lab results or changes in treatment. The nurses are available 7 days a week from 10A to 6:30P.  You can leave a message 24 hours per day and they will return your call.        Thank you for choosing [facility]       Thank you for choosing [facility] for your care. Our goal is always to provide you with excellent care. Hearing back from our patients is one way we can continue to improve our services. Please take a few minutes to complete the written survey that you may receive in the mail after you visit with us. Thank you!        [employer] Information     [employer] lets you send messages to your doctor, view your test results, renew your prescriptions, schedule appointments and more. To sign up, go to [email or web address] . Click on \"Log in\" on the left side of the screen, which will take you to the Welcome page. Then click on \"Sign up Now\" on the right side of the page.     You will be asked to enter the access code listed below, as well as some personal information. Please follow the directions to create your username and password.     Your access code is: [email or web address]  Expires: 3/6/2019  1:03 PM     Your access code will  in 90 days. If you need help or a new code, please call your [facility] clinic or [phone].        Care EveryWhere ID     This is your Care EveryWhere ID. This could be used by other organizations to access your [facility] medical records  [id number]        Equal Access to Services     ERICKA CHAVARRIA AH: lisbeth Benton, dotty jasso " kenny roth ah. So [facility] [phone].    ATENCIÓN: Si habla español, tiene a sam disposición servicios gratuitos de asistencia lingüística. Llame al [phone].    We comply with applicable federal civil rights laws and [place] laws. We do not discriminate on the basis of race, color, national origin, age, disability, sex, sexual orientation, or gender identity.            After Visit Summary       This is your record. Keep this with you and show to your community pharmacist(s) and doctor(s) at your next visit.

## 2019-01-24 ENCOUNTER — OFFICE VISIT (OUTPATIENT)
Dept: INTERPRETER SERVICES | Facility: CLINIC | Age: 34
End: 2019-01-24
Payer: COMMERCIAL

## 2019-01-24 ENCOUNTER — HOSPITAL ENCOUNTER (INPATIENT)
Facility: CLINIC | Age: 34
LOS: 3 days | Discharge: HOME | End: 2019-01-27
Attending: OBSTETRICS & GYNECOLOGY | Admitting: OBSTETRICS & GYNECOLOGY
Payer: COMMERCIAL

## 2019-01-24 ENCOUNTER — ANESTHESIA EVENT (OUTPATIENT)
Dept: OBGYN | Facility: CLINIC | Age: 34
End: 2019-01-24
Payer: COMMERCIAL

## 2019-01-24 ENCOUNTER — ANESTHESIA (OUTPATIENT)
Dept: OBGYN | Facility: CLINIC | Age: 34
End: 2019-01-24
Payer: COMMERCIAL

## 2019-01-24 DIAGNOSIS — Z98.891 S/P CESAREAN SECTION: Primary | ICD-10-CM

## 2019-01-24 LAB
ABO + RH BLD: NORMAL
ABO + RH BLD: NORMAL
APTT PPP: 28 SEC (ref 22–37)
APTT PPP: 30 SEC (ref 22–37)
BASOPHILS # BLD AUTO: 0 10E9/L (ref 0–0.2)
BASOPHILS # BLD AUTO: 0 10E9/L (ref 0–0.2)
BASOPHILS NFR BLD AUTO: 0.1 %
BASOPHILS NFR BLD AUTO: 0.2 %
BLD GP AB SCN SERPL QL: NORMAL
BLD PROD TYP BPU: NORMAL
BLD UNIT ID BPU: 0
BLD UNIT ID BPU: 0
BLOOD BANK CMNT PATIENT-IMP: NORMAL
BLOOD PRODUCT CODE: NORMAL
BLOOD PRODUCT CODE: NORMAL
BPU ID: NORMAL
BPU ID: NORMAL
DIFFERENTIAL METHOD BLD: NORMAL
DIFFERENTIAL METHOD BLD: NORMAL
EOSINOPHIL # BLD AUTO: 0 10E9/L (ref 0–0.7)
EOSINOPHIL # BLD AUTO: 0.1 10E9/L (ref 0–0.7)
EOSINOPHIL NFR BLD AUTO: 0 %
EOSINOPHIL NFR BLD AUTO: 1.1 %
ERYTHROCYTE [DISTWIDTH] IN BLOOD BY AUTOMATED COUNT: 14.1 % (ref 10–15)
ERYTHROCYTE [DISTWIDTH] IN BLOOD BY AUTOMATED COUNT: 14.1 % (ref 10–15)
FIBRINOGEN PPP-MCNC: 443 MG/DL (ref 200–420)
FIBRINOGEN PPP-MCNC: 538 MG/DL (ref 200–420)
HCT VFR BLD AUTO: 35.4 % (ref 35–47)
HCT VFR BLD AUTO: 37.9 % (ref 35–47)
HGB BLD-MCNC: 11.7 G/DL (ref 11.7–15.7)
HGB BLD-MCNC: 12.4 G/DL (ref 11.7–15.7)
IMM GRANULOCYTES # BLD: 0 10E9/L (ref 0–0.4)
IMM GRANULOCYTES # BLD: 0 10E9/L (ref 0–0.4)
IMM GRANULOCYTES NFR BLD: 0.4 %
IMM GRANULOCYTES NFR BLD: 0.6 %
INR PPP: 0.96 (ref 0.86–1.14)
INR PPP: 0.99 (ref 0.86–1.14)
LYMPHOCYTES # BLD AUTO: 0.8 10E9/L (ref 0.8–5.3)
LYMPHOCYTES # BLD AUTO: 1.7 10E9/L (ref 0.8–5.3)
LYMPHOCYTES NFR BLD AUTO: 11.1 %
LYMPHOCYTES NFR BLD AUTO: 27.5 %
MCH RBC QN AUTO: 29.2 PG (ref 26.5–33)
MCH RBC QN AUTO: 29.4 PG (ref 26.5–33)
MCHC RBC AUTO-ENTMCNC: 32.7 G/DL (ref 31.5–36.5)
MCHC RBC AUTO-ENTMCNC: 33.1 G/DL (ref 31.5–36.5)
MCV RBC AUTO: 89 FL (ref 78–100)
MCV RBC AUTO: 89 FL (ref 78–100)
MONOCYTES # BLD AUTO: 0.8 10E9/L (ref 0–1.3)
MONOCYTES # BLD AUTO: 1.1 10E9/L (ref 0–1.3)
MONOCYTES NFR BLD AUTO: 12.7 %
MONOCYTES NFR BLD AUTO: 15.2 %
NEUTROPHILS # BLD AUTO: 3.6 10E9/L (ref 1.6–8.3)
NEUTROPHILS # BLD AUTO: 5.5 10E9/L (ref 1.6–8.3)
NEUTROPHILS NFR BLD AUTO: 57.9 %
NEUTROPHILS NFR BLD AUTO: 73.2 %
NRBC # BLD AUTO: 0 10*3/UL
NRBC # BLD AUTO: 0 10*3/UL
NRBC BLD AUTO-RTO: 0 /100
NRBC BLD AUTO-RTO: 0 /100
NUM BPU REQUESTED: 2
PLATELET # BLD AUTO: 164 10E9/L (ref 150–450)
PLATELET # BLD AUTO: 170 10E9/L (ref 150–450)
RBC # BLD AUTO: 3.98 10E12/L (ref 3.8–5.2)
RBC # BLD AUTO: 4.25 10E12/L (ref 3.8–5.2)
SPECIMEN EXP DATE BLD: NORMAL
TRANSFUSION STATUS PATIENT QL: NORMAL
WBC # BLD AUTO: 6.3 10E9/L (ref 4–11)
WBC # BLD AUTO: 7.5 10E9/L (ref 4–11)

## 2019-01-24 PROCEDURE — 85730 THROMBOPLASTIN TIME PARTIAL: CPT | Performed by: OBSTETRICS & GYNECOLOGY

## 2019-01-24 PROCEDURE — 25000128 H RX IP 250 OP 636: Performed by: STUDENT IN AN ORGANIZED HEALTH CARE EDUCATION/TRAINING PROGRAM

## 2019-01-24 PROCEDURE — 88307 TISSUE EXAM BY PATHOLOGIST: CPT | Mod: 26 | Performed by: STUDENT IN AN ORGANIZED HEALTH CARE EDUCATION/TRAINING PROGRAM

## 2019-01-24 PROCEDURE — C1765 ADHESION BARRIER: HCPCS | Performed by: OBSTETRICS & GYNECOLOGY

## 2019-01-24 PROCEDURE — 86780 TREPONEMA PALLIDUM: CPT | Performed by: STUDENT IN AN ORGANIZED HEALTH CARE EDUCATION/TRAINING PROGRAM

## 2019-01-24 PROCEDURE — T1013 SIGN LANG/ORAL INTERPRETER: HCPCS | Mod: U3

## 2019-01-24 PROCEDURE — 36415 COLL VENOUS BLD VENIPUNCTURE: CPT | Performed by: STUDENT IN AN ORGANIZED HEALTH CARE EDUCATION/TRAINING PROGRAM

## 2019-01-24 PROCEDURE — 27210794 ZZH OR GENERAL SUPPLY STERILE: Performed by: OBSTETRICS & GYNECOLOGY

## 2019-01-24 PROCEDURE — 25000125 ZZHC RX 250: Performed by: STUDENT IN AN ORGANIZED HEALTH CARE EDUCATION/TRAINING PROGRAM

## 2019-01-24 PROCEDURE — 71000014 ZZH RECOVERY PHASE 1 LEVEL 2 FIRST HR: Performed by: OBSTETRICS & GYNECOLOGY

## 2019-01-24 PROCEDURE — 88307 TISSUE EXAM BY PATHOLOGIST: CPT | Performed by: STUDENT IN AN ORGANIZED HEALTH CARE EDUCATION/TRAINING PROGRAM

## 2019-01-24 PROCEDURE — 27110028 ZZH OR GENERAL SUPPLY NON-STERILE: Performed by: OBSTETRICS & GYNECOLOGY

## 2019-01-24 PROCEDURE — 85384 FIBRINOGEN ACTIVITY: CPT | Performed by: OBSTETRICS & GYNECOLOGY

## 2019-01-24 PROCEDURE — 40000268 ZZH STATISTIC NO CHARGES

## 2019-01-24 PROCEDURE — 85025 COMPLETE CBC W/AUTO DIFF WBC: CPT | Performed by: OBSTETRICS & GYNECOLOGY

## 2019-01-24 PROCEDURE — 25000132 ZZH RX MED GY IP 250 OP 250 PS 637

## 2019-01-24 PROCEDURE — 40000977 ZZH STATISTIC ATTENDANCE AT DELIVERY

## 2019-01-24 PROCEDURE — 36000059 ZZH SURGERY LEVEL 3 EA 15 ADDTL MIN UMMC: Performed by: OBSTETRICS & GYNECOLOGY

## 2019-01-24 PROCEDURE — 86900 BLOOD TYPING SEROLOGIC ABO: CPT | Performed by: OBSTETRICS & GYNECOLOGY

## 2019-01-24 PROCEDURE — 86923 COMPATIBILITY TEST ELECTRIC: CPT | Performed by: OBSTETRICS & GYNECOLOGY

## 2019-01-24 PROCEDURE — C9290 INJ, BUPIVACAINE LIPOSOME: HCPCS | Performed by: STUDENT IN AN ORGANIZED HEALTH CARE EDUCATION/TRAINING PROGRAM

## 2019-01-24 PROCEDURE — 37000009 ZZH ANESTHESIA TECHNICAL FEE, EACH ADDTL 15 MIN: Performed by: OBSTETRICS & GYNECOLOGY

## 2019-01-24 PROCEDURE — 25000132 ZZH RX MED GY IP 250 OP 250 PS 637: Performed by: STUDENT IN AN ORGANIZED HEALTH CARE EDUCATION/TRAINING PROGRAM

## 2019-01-24 PROCEDURE — 85027 COMPLETE CBC AUTOMATED: CPT | Performed by: STUDENT IN AN ORGANIZED HEALTH CARE EDUCATION/TRAINING PROGRAM

## 2019-01-24 PROCEDURE — 85610 PROTHROMBIN TIME: CPT | Performed by: OBSTETRICS & GYNECOLOGY

## 2019-01-24 PROCEDURE — P9016 RBC LEUKOCYTES REDUCED: HCPCS | Performed by: OBSTETRICS & GYNECOLOGY

## 2019-01-24 PROCEDURE — 86901 BLOOD TYPING SEROLOGIC RH(D): CPT | Performed by: OBSTETRICS & GYNECOLOGY

## 2019-01-24 PROCEDURE — 37000008 ZZH ANESTHESIA TECHNICAL FEE, 1ST 30 MIN: Performed by: OBSTETRICS & GYNECOLOGY

## 2019-01-24 PROCEDURE — 25000125 ZZHC RX 250: Performed by: OBSTETRICS & GYNECOLOGY

## 2019-01-24 PROCEDURE — 12000001 ZZH R&B MED SURG/OB UMMC

## 2019-01-24 PROCEDURE — 36000057 ZZH SURGERY LEVEL 3 1ST 30 MIN - UMMC: Performed by: OBSTETRICS & GYNECOLOGY

## 2019-01-24 PROCEDURE — 87653 STREP B DNA AMP PROBE: CPT | Performed by: OBSTETRICS & GYNECOLOGY

## 2019-01-24 PROCEDURE — 40000169 ZZH STATISTIC PRE-PROCEDURE ASSESSMENT I: Performed by: OBSTETRICS & GYNECOLOGY

## 2019-01-24 PROCEDURE — 86850 RBC ANTIBODY SCREEN: CPT | Performed by: OBSTETRICS & GYNECOLOGY

## 2019-01-24 RX ORDER — HYDROCORTISONE 2.5 %
CREAM (GRAM) TOPICAL 3 TIMES DAILY PRN
Status: DISCONTINUED | OUTPATIENT
Start: 2019-01-24 | End: 2019-01-27 | Stop reason: HOSPADM

## 2019-01-24 RX ORDER — ACETAMINOPHEN 325 MG/1
975 TABLET ORAL EVERY 8 HOURS
Status: DISCONTINUED | OUTPATIENT
Start: 2019-01-25 | End: 2019-01-27 | Stop reason: HOSPADM

## 2019-01-24 RX ORDER — NALBUPHINE HYDROCHLORIDE 10 MG/ML
2.5-5 INJECTION, SOLUTION INTRAMUSCULAR; INTRAVENOUS; SUBCUTANEOUS EVERY 6 HOURS PRN
Status: DISCONTINUED | OUTPATIENT
Start: 2019-01-24 | End: 2019-01-27 | Stop reason: HOSPADM

## 2019-01-24 RX ORDER — CARBOPROST TROMETHAMINE 250 UG/ML
INJECTION, SOLUTION INTRAMUSCULAR PRN
Status: DISCONTINUED | OUTPATIENT
Start: 2019-01-24 | End: 2019-01-24

## 2019-01-24 RX ORDER — MORPHINE SULFATE 1 MG/ML
150 INJECTION, SOLUTION EPIDURAL; INTRATHECAL; INTRAVENOUS ONCE
Status: COMPLETED | OUTPATIENT
Start: 2019-01-24 | End: 2019-01-24

## 2019-01-24 RX ORDER — CEFAZOLIN SODIUM 1 G/3ML
1 INJECTION, POWDER, FOR SOLUTION INTRAMUSCULAR; INTRAVENOUS SEE ADMIN INSTRUCTIONS
Status: DISCONTINUED | OUTPATIENT
Start: 2019-01-24 | End: 2019-01-24 | Stop reason: HOSPADM

## 2019-01-24 RX ORDER — NALOXONE HYDROCHLORIDE 0.4 MG/ML
.1-.4 INJECTION, SOLUTION INTRAMUSCULAR; INTRAVENOUS; SUBCUTANEOUS
Status: DISCONTINUED | OUTPATIENT
Start: 2019-01-24 | End: 2019-01-27 | Stop reason: HOSPADM

## 2019-01-24 RX ORDER — FLUMAZENIL 0.1 MG/ML
0.2 INJECTION, SOLUTION INTRAVENOUS
Status: DISCONTINUED | OUTPATIENT
Start: 2019-01-24 | End: 2019-01-27 | Stop reason: HOSPADM

## 2019-01-24 RX ORDER — CITRIC ACID/SODIUM CITRATE 334-500MG
30 SOLUTION, ORAL ORAL
Status: COMPLETED | OUTPATIENT
Start: 2019-01-24 | End: 2019-01-24

## 2019-01-24 RX ORDER — ONDANSETRON 2 MG/ML
4 INJECTION INTRAMUSCULAR; INTRAVENOUS EVERY 6 HOURS PRN
Status: DISCONTINUED | OUTPATIENT
Start: 2019-01-24 | End: 2019-01-27 | Stop reason: HOSPADM

## 2019-01-24 RX ORDER — OXYTOCIN/0.9 % SODIUM CHLORIDE 30/500 ML
340 PLASTIC BAG, INJECTION (ML) INTRAVENOUS CONTINUOUS PRN
Status: DISCONTINUED | OUTPATIENT
Start: 2019-01-24 | End: 2019-01-27 | Stop reason: HOSPADM

## 2019-01-24 RX ORDER — SODIUM CHLORIDE, SODIUM LACTATE, POTASSIUM CHLORIDE, CALCIUM CHLORIDE 600; 310; 30; 20 MG/100ML; MG/100ML; MG/100ML; MG/100ML
INJECTION, SOLUTION INTRAVENOUS
Status: DISCONTINUED
Start: 2019-01-24 | End: 2019-01-24 | Stop reason: HOSPADM

## 2019-01-24 RX ORDER — MISOPROSTOL 200 UG/1
TABLET ORAL
Status: DISCONTINUED
Start: 2019-01-24 | End: 2019-01-24 | Stop reason: HOSPADM

## 2019-01-24 RX ORDER — EPHEDRINE SULFATE 50 MG/ML
5 INJECTION, SOLUTION INTRAMUSCULAR; INTRAVENOUS; SUBCUTANEOUS
Status: DISCONTINUED | OUTPATIENT
Start: 2019-01-24 | End: 2019-01-27 | Stop reason: HOSPADM

## 2019-01-24 RX ORDER — MISOPROSTOL 200 UG/1
800 TABLET ORAL
Status: DISCONTINUED | OUTPATIENT
Start: 2019-01-24 | End: 2019-01-27 | Stop reason: HOSPADM

## 2019-01-24 RX ORDER — FENTANYL CITRATE 50 UG/ML
INJECTION, SOLUTION INTRAMUSCULAR; INTRAVENOUS PRN
Status: DISCONTINUED | OUTPATIENT
Start: 2019-01-24 | End: 2019-01-24

## 2019-01-24 RX ORDER — OXYTOCIN/0.9 % SODIUM CHLORIDE 30/500 ML
100 PLASTIC BAG, INJECTION (ML) INTRAVENOUS CONTINUOUS
Status: DISCONTINUED | OUTPATIENT
Start: 2019-01-24 | End: 2019-01-27 | Stop reason: HOSPADM

## 2019-01-24 RX ORDER — SODIUM CHLORIDE, SODIUM LACTATE, POTASSIUM CHLORIDE, CALCIUM CHLORIDE 600; 310; 30; 20 MG/100ML; MG/100ML; MG/100ML; MG/100ML
INJECTION, SOLUTION INTRAVENOUS CONTINUOUS PRN
Status: DISCONTINUED | OUTPATIENT
Start: 2019-01-24 | End: 2019-01-24

## 2019-01-24 RX ORDER — AMOXICILLIN 250 MG
2 CAPSULE ORAL 2 TIMES DAILY PRN
Status: DISCONTINUED | OUTPATIENT
Start: 2019-01-24 | End: 2019-01-27 | Stop reason: HOSPADM

## 2019-01-24 RX ORDER — DEXTROSE, SODIUM CHLORIDE, SODIUM LACTATE, POTASSIUM CHLORIDE, AND CALCIUM CHLORIDE 5; .6; .31; .03; .02 G/100ML; G/100ML; G/100ML; G/100ML; G/100ML
INJECTION, SOLUTION INTRAVENOUS CONTINUOUS
Status: DISCONTINUED | OUTPATIENT
Start: 2019-01-24 | End: 2019-01-27 | Stop reason: HOSPADM

## 2019-01-24 RX ORDER — AMOXICILLIN 250 MG
1 CAPSULE ORAL 2 TIMES DAILY PRN
Status: DISCONTINUED | OUTPATIENT
Start: 2019-01-24 | End: 2019-01-27 | Stop reason: HOSPADM

## 2019-01-24 RX ORDER — MORPHINE SULFATE 1 MG/ML
INJECTION, SOLUTION EPIDURAL; INTRATHECAL; INTRAVENOUS
Status: DISCONTINUED
Start: 2019-01-24 | End: 2019-01-24 | Stop reason: HOSPADM

## 2019-01-24 RX ORDER — OXYCODONE HYDROCHLORIDE 5 MG/1
5-10 TABLET ORAL
Status: DISCONTINUED | OUTPATIENT
Start: 2019-01-24 | End: 2019-01-27 | Stop reason: HOSPADM

## 2019-01-24 RX ORDER — MISOPROSTOL 200 UG/1
TABLET ORAL
Status: COMPLETED
Start: 2019-01-24 | End: 2019-01-24

## 2019-01-24 RX ORDER — LIDOCAINE 40 MG/G
CREAM TOPICAL
Status: DISCONTINUED | OUTPATIENT
Start: 2019-01-24 | End: 2019-01-27 | Stop reason: HOSPADM

## 2019-01-24 RX ORDER — CITRIC ACID/SODIUM CITRATE 334-500MG
SOLUTION, ORAL ORAL
Status: DISCONTINUED
Start: 2019-01-24 | End: 2019-01-24 | Stop reason: HOSPADM

## 2019-01-24 RX ORDER — SIMETHICONE 80 MG
80 TABLET,CHEWABLE ORAL 4 TIMES DAILY PRN
Status: DISCONTINUED | OUTPATIENT
Start: 2019-01-24 | End: 2019-01-27 | Stop reason: HOSPADM

## 2019-01-24 RX ORDER — FENTANYL CITRATE 50 UG/ML
10 INJECTION, SOLUTION INTRAMUSCULAR; INTRAVENOUS ONCE
Status: DISCONTINUED | OUTPATIENT
Start: 2019-01-24 | End: 2019-01-27 | Stop reason: HOSPADM

## 2019-01-24 RX ORDER — CEFAZOLIN SODIUM 2 G/100ML
2 INJECTION, SOLUTION INTRAVENOUS
Status: DISCONTINUED | OUTPATIENT
Start: 2019-01-24 | End: 2019-01-24 | Stop reason: HOSPADM

## 2019-01-24 RX ORDER — OXYTOCIN 10 [USP'U]/ML
10 INJECTION, SOLUTION INTRAMUSCULAR; INTRAVENOUS
Status: DISCONTINUED | OUTPATIENT
Start: 2019-01-24 | End: 2019-01-27 | Stop reason: HOSPADM

## 2019-01-24 RX ORDER — CARBOPROST TROMETHAMINE 250 UG/ML
250 INJECTION, SOLUTION INTRAMUSCULAR
Status: DISCONTINUED | OUTPATIENT
Start: 2019-01-24 | End: 2019-01-27 | Stop reason: HOSPADM

## 2019-01-24 RX ORDER — NALOXONE HYDROCHLORIDE 0.4 MG/ML
.1-.4 INJECTION, SOLUTION INTRAMUSCULAR; INTRAVENOUS; SUBCUTANEOUS
Status: DISCONTINUED | OUTPATIENT
Start: 2019-01-24 | End: 2019-01-24

## 2019-01-24 RX ORDER — ACETAMINOPHEN 325 MG/1
650 TABLET ORAL EVERY 4 HOURS PRN
Status: DISCONTINUED | OUTPATIENT
Start: 2019-01-27 | End: 2019-01-27 | Stop reason: HOSPADM

## 2019-01-24 RX ORDER — METHYLERGONOVINE MALEATE 0.2 MG/ML
200 INJECTION INTRAVENOUS
Status: DISCONTINUED | OUTPATIENT
Start: 2019-01-24 | End: 2019-01-27 | Stop reason: HOSPADM

## 2019-01-24 RX ORDER — METHYLERGONOVINE MALEATE 0.2 MG/ML
INJECTION INTRAVENOUS PRN
Status: DISCONTINUED | OUTPATIENT
Start: 2019-01-24 | End: 2019-01-24

## 2019-01-24 RX ORDER — BISACODYL 10 MG
10 SUPPOSITORY, RECTAL RECTAL DAILY PRN
Status: DISCONTINUED | OUTPATIENT
Start: 2019-01-26 | End: 2019-01-27 | Stop reason: HOSPADM

## 2019-01-24 RX ORDER — BUPIVACAINE HYDROCHLORIDE 7.5 MG/ML
1.6 INJECTION, SOLUTION EPIDURAL; RETROBULBAR ONCE
Status: COMPLETED | OUTPATIENT
Start: 2019-01-24 | End: 2019-01-24

## 2019-01-24 RX ORDER — CARBOPROST TROMETHAMINE 250 UG/ML
INJECTION, SOLUTION INTRAMUSCULAR
Status: COMPLETED
Start: 2019-01-24 | End: 2019-01-24

## 2019-01-24 RX ORDER — ONDANSETRON 2 MG/ML
INJECTION INTRAMUSCULAR; INTRAVENOUS PRN
Status: DISCONTINUED | OUTPATIENT
Start: 2019-01-24 | End: 2019-01-24

## 2019-01-24 RX ORDER — LANOLIN 100 %
OINTMENT (GRAM) TOPICAL
Status: DISCONTINUED | OUTPATIENT
Start: 2019-01-24 | End: 2019-01-27 | Stop reason: HOSPADM

## 2019-01-24 RX ORDER — BUPIVACAINE HYDROCHLORIDE 2.5 MG/ML
INJECTION, SOLUTION EPIDURAL; INFILTRATION; INTRACAUDAL PRN
Status: DISCONTINUED | OUTPATIENT
Start: 2019-01-24 | End: 2019-01-24

## 2019-01-24 RX ORDER — SODIUM CHLORIDE, SODIUM LACTATE, POTASSIUM CHLORIDE, CALCIUM CHLORIDE 600; 310; 30; 20 MG/100ML; MG/100ML; MG/100ML; MG/100ML
INJECTION, SOLUTION INTRAVENOUS CONTINUOUS
Status: DISCONTINUED | OUTPATIENT
Start: 2019-01-24 | End: 2019-01-24 | Stop reason: HOSPADM

## 2019-01-24 RX ORDER — METHYLERGONOVINE MALEATE 0.2 MG/ML
INJECTION INTRAVENOUS
Status: COMPLETED
Start: 2019-01-24 | End: 2019-01-24

## 2019-01-24 RX ADMIN — MORPHINE SULFATE 100 MCG: 1 INJECTION, SOLUTION EPIDURAL; INTRATHECAL; INTRAVENOUS at 17:11

## 2019-01-24 RX ADMIN — PHENYLEPHRINE HYDROCHLORIDE 100 MCG: 10 INJECTION, SOLUTION INTRAMUSCULAR; INTRAVENOUS; SUBCUTANEOUS at 17:26

## 2019-01-24 RX ADMIN — ONDANSETRON 4 MG: 2 INJECTION INTRAMUSCULAR; INTRAVENOUS at 17:26

## 2019-01-24 RX ADMIN — SODIUM CHLORIDE, POTASSIUM CHLORIDE, SODIUM LACTATE AND CALCIUM CHLORIDE 1000 ML: 600; 310; 30; 20 INJECTION, SOLUTION INTRAVENOUS at 16:36

## 2019-01-24 RX ADMIN — BUPIVACAINE HYDROCHLORIDE 1.7 ML: 7.5 INJECTION, SOLUTION EPIDURAL; RETROBULBAR at 17:11

## 2019-01-24 RX ADMIN — FENTANYL CITRATE 10 MCG: 50 INJECTION, SOLUTION INTRAMUSCULAR; INTRAVENOUS at 17:11

## 2019-01-24 RX ADMIN — BUPIVACAINE HYDROCHLORIDE 20 ML: 2.5 INJECTION, SOLUTION EPIDURAL; INFILTRATION; INTRACAUDAL at 19:40

## 2019-01-24 RX ADMIN — SODIUM CHLORIDE, POTASSIUM CHLORIDE, SODIUM LACTATE AND CALCIUM CHLORIDE: 600; 310; 30; 20 INJECTION, SOLUTION INTRAVENOUS at 17:05

## 2019-01-24 RX ADMIN — CARBOPROST TROMETHAMINE 250 MCG: 250 INJECTION, SOLUTION INTRAMUSCULAR at 18:04

## 2019-01-24 RX ADMIN — PHENYLEPHRINE HYDROCHLORIDE 0.5 MCG/KG/MIN: 10 INJECTION, SOLUTION INTRAMUSCULAR; INTRAVENOUS; SUBCUTANEOUS at 17:11

## 2019-01-24 RX ADMIN — METHYLERGONOVINE MALEATE 200 MCG: 0.2 INJECTION INTRAMUSCULAR; INTRAVENOUS at 17:49

## 2019-01-24 RX ADMIN — SODIUM CHLORIDE, POTASSIUM CHLORIDE, SODIUM LACTATE AND CALCIUM CHLORIDE: 600; 310; 30; 20 INJECTION, SOLUTION INTRAVENOUS at 17:37

## 2019-01-24 RX ADMIN — SODIUM CITRATE AND CITRIC ACID MONOHYDRATE 30 ML: 500; 334 SOLUTION ORAL at 16:56

## 2019-01-24 RX ADMIN — CARBOPROST TROMETHAMINE 250 MCG: 250 INJECTION, SOLUTION INTRAMUSCULAR at 17:49

## 2019-01-24 RX ADMIN — BUPIVACAINE 20 ML: 13.3 INJECTION, SUSPENSION, LIPOSOMAL INFILTRATION at 19:40

## 2019-01-24 RX ADMIN — PHENYLEPHRINE HYDROCHLORIDE 100 MCG: 10 INJECTION, SOLUTION INTRAMUSCULAR; INTRAVENOUS; SUBCUTANEOUS at 17:59

## 2019-01-24 RX ADMIN — TRANEXAMIC ACID 1 G: 100 INJECTION, SOLUTION INTRAVENOUS at 18:34

## 2019-01-24 RX ADMIN — OXYTOCIN-SODIUM CHLORIDE 0.9% IV SOLN 30 UNIT/500ML 100 ML/HR: 30-0.9/5 SOLUTION at 19:19

## 2019-01-24 NOTE — PLAN OF CARE
Transfer to OR & C/S Delivery Note  Patient to OR at 1700 via cart.   Delivered viable Female via repeat  section by Dr. Barton.  To warmer, stimulated and dried by NICU team.   Placenta labeled with mother label, umbilical cord segment labeled with baby label.  Brought to mother after bundling for bonding.

## 2019-01-24 NOTE — H&P
L&D History and Physical   2019  Trina Rooney  [id number]      HPI: Trina Rooney is a 33 year old  at 36w0d by LMP c/w 11w3d US who presented from the ED with vaginal bleeding. She gets her prenatal care from [facility] but because of the vaginal bleeding she came to the nearest emergency department. She noted that she felt a gush of bleeding this morning at 0500 but then it slowed so she went to work. But she kept feeling it and felt another gush right before the ED, which is what prompted her to come in. Is having occasional contractions, notes a small amt of fetal movement. Has not had bleeding previously in this pregnnacy.    She denies complications in this pregnancy including high blood pressure and gestational diabetes. Notes that her previous c/s was after laboring. She came in 3 weeks overdue and they tried to induce her, but her baby did not tolerate induction. Per chart review, had a PLTCS at [place] for cat II remote from delivery. Denies any complications with previous  section.    She states that she is feeling well today.  She denies headache, vision changes, chest pain, shortness of breath, fever, chills, nausea, vomiting or other systemic complaints.     Her pregnancy has been complicated by:  - Hx of c/s x1 for cat II remote from delivery    OBHX:   Obstetric History       T1      L1     SAB1   TAB0   Ectopic0   Multiple0   Live Births0       # Outcome Date GA Lbr [place]/2nd Weight Sex Delivery Anes PTL Lv   3 Current            2 Term      -SEC      1 SAB                   Patient denies any medical history outside of pregnancy    Patient notes that she has had one  section but denies other abdominal surgeries.    Medications:     No current facility-administered medications on file prior to encounter.   Current Outpatient Medications on File Prior to Encounter:  Cholecalciferol (VITAMIN D) 2000 UNITS tablet Take 2,000 Units by mouth  "  metoclopramide (REGLAN) 10 MG tablet Take 1 tablet (10 mg) by mouth 4 times daily as needed (Nausea and vomiting)   Prenatal Vit-Fe Fumarate-FA (PRENATAL VITAMIN) 27-0.8 MG TABS Take 1 tablet by mouth daily     Taking a prenatal vitamin    No known drug allergies    Family History   Problem Relation Age of Onset     Family History Negative Other        SocialHX:   Social History     Tobacco Use     Smoking status: Never Smoker     Smokeless tobacco: Never Used   Substance Use Topics     Alcohol use: No     Drug use: No   denies smoking, drinking and drug use. Here with here  and 5 yo daughter    ROS: 10-point ROS negative except as indicated in HPI.    Physical Exam:  Vitals:    01/24/19 1513 01/24/19 1525   BP: 113/76 115/76   Pulse: 75    Resp: 16 18   Temp: 97.6  F (36.4  C) 98.2  F (36.8  C)   TempSrc: Oral Oral   SpO2: 100%      General: alert, oriented female, resting in bed in NAD  CV: regular rate and rhythm,  Lungs: nonlabored breathinf  Abdomen: soft, gravid, non-tender, EFW 7.5.  Extremities: bilateral lower extremities non-tender with trace edema    SSE: Grossly ruptured, copious fluid in vault. Dark-bloody fluid. Increased leaking with valsalva.     SVE: 2/40/-3  Membranes: ROM    Presentation: cephalic by SVE    FHT: baseline 145, moderate variability, positive accelerations, no decelerations  [place]: q2-4min contractions    Prenatal ultrasounds:  Per care everywhere, anterior placenta, no previa. \"slight thinning of placenta creating a folded appearance on the left side. Not a membrane between these areas but placental tissue. Cord inserts into the placenta away from this thinned area    Prenatal Labs:   Lab Results   Component Value Date    ABO PENDING 01/24/2019    RH  Pos 08/07/2017    AS PENDING 01/24/2019    CHPCRT  08/07/2017     Negative   Negative for C. trachomatis rRNA by transcription mediated amplification.   A negative result by transcription mediated amplification does not " preclude the   presence of C. trachomatis infection because results are dependent on proper   and adequate collection, absence of inhibitors, and sufficient rRNA to be   detected.      GCPCRT  08/07/2017     Negative   Negative for N. gonorrhoeae rRNA by transcription mediated amplification.   A negative result by transcription mediated amplification does not preclude the   presence of N. gonorrhoeae infection because results are dependent on proper   and adequate collection, absence of inhibitors, and sufficient rRNA to be   detected.      HGB 12.4 01/24/2019       GBS Status:   unknown    Lab Results   Component Value Date    PAP NIL 03/03/2015 2/8/2017 NIL HPV negative ([facility] everywhere)    Labs:   Results for orders placed or performed during the hospital encounter of 01/24/19 (from the past 24 hour(s))   CBC with platelets differential   Result Value Ref Range    WBC 6.3 4.0 - 11.0 10e9/L    RBC Count 4.25 3.8 - 5.2 10e12/L    Hemoglobin 12.4 11.7 - 15.7 g/dL    Hematocrit 37.9 35.0 - 47.0 %    MCV 89 78 - 100 fl    MCH 29.2 26.5 - 33.0 pg    MCHC 32.7 31.5 - 36.5 g/dL    RDW 14.1 10.0 - 15.0 %    Platelet Count 164 150 - 450 10e9/L    Diff Method Automated Method     % Neutrophils 57.9 %    % Lymphocytes 27.5 %    % Monocytes 12.7 %    % Eosinophils 1.1 %    % Basophils 0.2 %    % Immature Granulocytes 0.6 %    Nucleated RBCs 0 0 /100    Absolute Neutrophil 3.6 1.6 - 8.3 10e9/L    Absolute Lymphocytes 1.7 0.8 - 5.3 10e9/L    Absolute Monocytes 0.8 0.0 - 1.3 10e9/L    Absolute Eosinophils 0.1 0.0 - 0.7 10e9/L    Absolute Basophils 0.0 0.0 - 0.2 10e9/L    Abs Immature Granulocytes 0.0 0 - 0.4 10e9/L    Absolute Nucleated RBC 0.0    INR   Result Value Ref Range    INR 0.96 0.86 - 1.14   Partial thromboplastin time   Result Value Ref Range    PTT 28 22 - 37 sec   ABO/Rh type and screen   Result Value Ref Range    ABO PENDING     Antibody Screen PENDING     Test Valid Only At          [facility]  [facility]    Specimen Expires 2019    Fibrinogen activity   Result Value Ref Range    Fibrinogen 538 (H) 200 - 420 mg/dL       Assessment: 33 year old  at 36w0d by LMP c/w w/ first trimester US, here for vaginal bleeding. Found to be grossly ruptured with bloody fluid. While FHT is reassuring, cannot rule out abruption. Plan for repeat C/s at this time. Anterior placenta. EFW 7.5.   Pregnancy also complicated by: hx of c/s x1 for Cat II RFD    Plan:    Discussed with patient that she has the option to TOLAC vs repeat  section. Discussed the risks of uterine rupture and also recommended repeat  in the setting of possible abruption, though still with reassuring FHT. Patient desires repeat  section. The risks, benefits, and alternatives of  section were discussed, including the risks of bleeding, infection, injury to surrounding organs, fetal injury, and remote risks of hysterectomy. She consented to a blood transfusion in the event of a life threatening amount of bleeding. She had time to ask questions and agreed to proceed. Surgical consent was signed.      Pregnancy  - Admit to labor and delivery for c/s in the setting of PPROM and labor with desire for repeat c/s  - Type and Screen, CBC, RPR, Coags including fibrinogen in the setting of possible abruption.   - Anesthesia notified, will go urgently. Pt last at at 1100 today  - Ancef and azithromycin for c/s  - NICU for delivery  - GBS unknown, drawn and pending  - Rh positive- rhogam not indicated    Fetal well-being  - Category 1 FHT that changed to cat II due to fetal tachycardia    Patient seen and care plan discussed under supervision of Dr. Gaona.    Cheli Barber MD  Obstetrics and Gyncology, PGY-2  2019 , 3:45 PM      The patient was reviewed with Dr. Barber.  I agree with the above assessment and plan of care.  Plan to proceed with repeat c/s at this time.       Adalgisa Gaona MD, FACOG

## 2019-01-24 NOTE — DISCHARGE SUMMARY
[facility] Discharge Summary    Trina Rooney MRN# [id number]   Age: 33 year old YOB: 1985     Date of Admission:  2019  Date of Discharge::  2019  Admitting Physician:  Adalgisa Gaona MD  Discharge Physician:  Gina Lundberg MD         Admission Diagnoses:   1.  at 36w0d  2. History of  section x1  3. PPROM  4.  labor          Discharge Diagnosis:   1.  at 36w0d, now delivered  2. Same as above  3. Placental abruption          Procedures:   Procedure(s): Repeat low transverse  section with two layer closure via Pfannenstiel skin incision          Medications Prior to Admission:     Medications Prior to Admission   Medication Sig Dispense Refill Last Dose     Cholecalciferol (VITAMIN D) 2000 UNITS tablet Take 2,000 Units by mouth   2018     Prenatal Vit-Fe Fumarate-FA (PRENATAL VITAMIN) 27-0.8 MG TABS Take 1 tablet by mouth daily 90 tablet 3 2018 at Unknown time     [DISCONTINUED] metoclopramide (REGLAN) 10 MG tablet Take 1 tablet (10 mg) by mouth 4 times daily as needed (Nausea and vomiting) 30 tablet 1           Discharge Medications:        Review of your medicines      START taking      Dose / Directions   acetaminophen 325 MG tablet  Commonly known as:  TYLENOL      Dose:  650 mg  Take 2 tablets (650 mg) by mouth every 6 hours as needed for mild pain  Quantity:  60 tablet  Refills:  0     ibuprofen 600 MG tablet  Commonly known as:  ADVIL/MOTRIN      Dose:  600 mg  Take 1 tablet (600 mg) by mouth every 6 hours as needed for moderate pain  Quantity:  60 tablet  Refills:  0     senna-docusate 8.6-50 MG tablet  Commonly known as:  SENOKOT-S/PERICOLACE      Dose:  1 tablet  Take 1 tablet by mouth 2 times daily as needed for constipation  Quantity:  100 tablet  Refills:  0        CONTINUE these medicines which have NOT CHANGED      Dose / Directions   Prenatal Vitamin 27-0.8 MG Tabs  Used for:  Family planning      Dose:  1  tablet  Take 1 tablet by mouth daily  Quantity:  90 tablet  Refills:  3     vitamin D3 2000 units tablet  Commonly known as:  CHOLECALCIFEROL      Dose:  2000 Units  Take 2,000 Units by mouth  Refills:  0        STOP taking    metoclopramide 10 MG tablet  Commonly known as:  REGLAN              Where to get your medicines      These medications were sent to [facility] [place], [place] - [address]  [address] [facility] [address], [facility] [place] [address]    Phone:  [phone]     acetaminophen 325 MG tablet    ibuprofen 600 MG tablet    senna-docusate 8.6-50 MG tablet            Consultations:   Anesthesia  NICU          Brief Admission History:     Trina Rooney is a 33 year old  at 36w0d by LMP c/w 11w3d US who presented from the ED with vaginal bleeding. She gets her prenatal care from [facility] but because of the vaginal bleeding she came to the nearest emergency department. She noted that she felt a gush of bleeding this morning at 0500 but then it slowed so she went to work. But she kept feeling it and felt another gush right before the ED, which is what prompted her to come in. Is having occasional contractions, notes a small amt of fetal movement. Has not had bleeding previously in this pregnnacy.     She denies complications in this pregnancy including high blood pressure and gestational diabetes. Notes that her previous c/s was after laboring. She came in 3 weeks overdue and they tried to induce her, but her baby did not tolerate induction. Per chart review, had a PLTCS at [place] for cat II remote from delivery. Denies any complications with previous  section.     She states that she is feeling well today.  She denies headache, vision changes, chest pain, shortness of breath, fever, chills, nausea, vomiting or other systemic complaints. She denies vaginal bleeding or loss of fluid and is feeling normal fetal movement.       Intraoperative course   The procedure was complicated  by uterine atony required pitocin, misoprostol VT, methergine x1, hemabate x2 and TXA.   mL.  See operative report for details.     1. Single, viable female infant at 1742 hours on 1/24/2019. Apgars of 6 and 9 at one and five minutes.  Birth weight: pending at time of note.  Fetal presentation: OA. Amniotic fluid: bloody.    2. Blood-tinged fluid and dark clot noted behind placenta, consistent with placental abruption  3. Placenta intact with 3 vessel cord, sent to pathology  4. Normal appearing uterus, fallopian tubes. Small left ovarian cyst.   5. Minimal subcutaneous adhesions. Moderately thickened rectofascial adhesions, once taken down there was entry into the peritoneum without need for dissection in. Omental adhesions to the anterior uterus requiring suture-ligation before total entry into the abdomen. No other obvious intraabdominal adhesions       Postpartum Course   The patient's hospital course was unremarkable.  Hemoglobin remained stable at 11.0 with serial checks. She recovered as anticipated and experienced no post-operative complications. On discharge, her pain was well controlled. Vaginal bleeding is similar to peak menstrual flow.  Voiding without difficulty.  Ambulating well and tolerating a normal diet.  No fever or significant wound drainage.  Breastfeeding and bottle feeding well.  Infant is stable.  She was discharged on post-partum day #3.    Post-partum hemoglobin:   Hemoglobin   Date Value Ref Range Status   01/25/2019 11.0 (L) 11.7 - 15.7 g/dL Final          Discharge Instructions and Follow-Up:   Discharge diet: Regular   Discharge activity: No lifting greater than 20 lbs, pushing, pulling, or other strenuous activity for 6 weeks. Pelvic rest for 6 weeks including no sexual intercourse, tampons, or douching. No driving until you can slam on the breaks without pain or while on narcotic pain medications.    Discharge follow-up: Follow up with primary OB for routine postpartum visit  in 6 weeks   Wound care: Keep incision clean and dry           Discharge Disposition:   Discharged to home      Byron Rivera MD  OB/GYN Resident, PGY-3  1/27/2019    [facility] staff:  Appreciate note by Dr. Rivera.  I have seen and examined the patient without the resident. I have reviewed, edited, and agree with the note.        Gina Lundberg MD, FACOG  1/27/2019  10:19 AM

## 2019-01-24 NOTE — ANESTHESIA PREPROCEDURE EVALUATION
Anesthesia Pre-Procedure Evaluation    Patient: Trina Rooney   MRN:     [id number] Gender:   female   Age:    33 year old :      1985        Preoperative Diagnosis: pregnancy   Procedure(s):   SECTION     History reviewed. No pertinent past medical history.   Past Surgical History:   Procedure Laterality Date     ABDOMEN SURGERY           C/SECTION, LOW TRANSVERSE  2014    , Low Transverse     C/SECTION, LOW TRANSVERSE N/A 2014    , Low Transverse          Anesthesia Evaluation     . Pt has had prior anesthetic. Type: MAC and Regional    No history of anesthetic complications          ROS/MED HX    ENT/Pulmonary:  - neg pulmonary ROS     Neurologic:  - neg neurologic ROS     Cardiovascular:  - neg cardiovascular ROS   (+) ----. : . . . :. . No previous cardiac testing       METS/Exercise Tolerance:  4 - Raking leaves, gardening   Hematologic:  - neg hematologic  ROS       Musculoskeletal:  - neg musculoskeletal ROS       GI/Hepatic:     (+) GERD       Renal/Genitourinary:  - ROS Renal section negative       Endo:  - neg endo ROS       Psychiatric:  - neg psychiatric ROS       Infectious Disease:  - neg infectious disease ROS       Malignancy:      - no malignancy   Other:    (+) Possibly pregnant C-spine cleared: Yes, no H/O Chronic Pain,no other significant disability                        PHYSICAL EXAM:   Mental Status/Neuro: A/A/O   Airway: Facies: Feasible  Mallampati: III  Mouth/Opening: Full  TM distance: > 6 cm  Neck ROM: Full   Respiratory: Auscultation: CTAB     Resp. Rate: Normal     Resp. Effort: Normal      CV: Rhythm: Regular  Rate: Age appropriate  Heart: Normal Sounds   Comments:      Dental: Normal                  Lab Results   Component Value Date    WBC 6.6 2018    HGB 12.1 2018    HCT 35.8 2018     2018     2018    POTASSIUM 3.8 2018    CHLORIDE 109 2018    CO2 22 2018    BUN 9  "12/06/2018    CR 0.42 (L) 12/06/2018     (H) 12/06/2018    HORACIO 8.6 12/06/2018    ALBUMIN 2.7 (L) 12/06/2018    PROTTOTAL 6.7 (L) 12/06/2018    ALT 26 12/06/2018    AST 22 12/06/2018    ALKPHOS 77 12/06/2018    BILITOTAL 0.2 12/06/2018    LIPASE 110 12/06/2018    HCG Negative 08/07/2017       Preop Vitals  BP Readings from Last 3 Encounters:   01/24/19 115/76   12/06/18 110/80   03/15/18 116/68    Pulse Readings from Last 3 Encounters:   01/24/19 75   12/06/18 82   03/15/18 62      Resp Readings from Last 3 Encounters:   01/24/19 18   12/06/18 18   03/15/18 14    SpO2 Readings from Last 3 Encounters:   01/24/19 100%   12/06/18 98%   03/15/18 99%      Temp Readings from Last 1 Encounters:   01/24/19 36.8  C (98.2  F) (Oral)    Ht Readings from Last 1 Encounters:   06/13/17 1.632 m (5' 4.25\")      Wt Readings from Last 1 Encounters:   12/06/18 80.3 kg (177 lb 2 oz)    Estimated body mass index is 30.17 kg/m  as calculated from the following:    Height as of 6/13/17: 1.632 m (5' 4.25\").    Weight as of 12/6/18: 80.3 kg (177 lb 2 oz).     LDA:            Assessment:   ASA SCORE: 2    NPO Status: > 6 hours since completed Solid Foods   Documentation: H&P complete; Consents complete   Proceeding: Proceed without further delay  Tobacco Use:  Active user of Tobacco     Plan:   Anes. Type:  Regional; MAC     RA Details:  Labor/OB Procedure; SS     RA-Location/Type: Spinal   Pre-Induction: None     Drips/Meds-Preparation: Oxytocin; Phenylephrine   Induction:  Not applicable   Airway: Native Airway   Access/Monitoring: PIV   Maintenance: N/a   Emergence: Not Applicable   Logistics: Observation/Admission     Postop Pain/Sedation Strategy:  Standard-Options: Block SS; Exparel; Opioids PRN     PONV Management:  Adult Risk Factors: Female, Postop Opioids  Prevention: Ondansetron     CONSENT: Direct conversation   Plan and risks discussed with: Patient                            Sahil Scott, DO  "

## 2019-01-24 NOTE — PLAN OF CARE
D: Patient presents to labor and delivery with SROM at 0500 this am with clear fluid. States she is having some bleeding. Receives her care a [facility] and can view records in care everywhere. Patient is requesting a repeat  at this time.  Admission completed. Dr. Gaona notified and will proceed with  for ROM, contractions and bleeding. P: Continue to monitor.

## 2019-01-24 NOTE — ANESTHESIA PROCEDURE NOTES
Spinal/LP Procedure Note    Spinal Block  Date/Time: 1/24/2019 5:11 PM  Staff:     Anesthesiologist:  Kasandra Duffy MD    Resident/CRNA:  Je Walker MD    Spinal/LP performed by resident/CRNA in presence of a teaching physician.    Location: OR  Procedure Start/Stop Times:     patient identified, IV checked, site marked, risks and benefits discussed, informed consent, monitors and equipment checked, pre-op evaluation, at physician/surgeon's request and post-op pain management      Correct Patient: Yes      Correct Position: Yes      Correct Site: Yes      Correct Procedure: Yes      Correct Laterality:  Yes and N/A    Site Marked:  Yes and N/A  Procedure:     Procedure:  Intrathecal    ASA:  2    Position:  Sitting    Sterile Prep: mask      Insertion site:  L3-4    Approach:  Midline    Needle Type:  Yuliana    Needle gauge (G):  25    Local Skin Infiltration:  1% lidocaine    amount (ml):  3    Introducer used: Yes      Introducer gauge:  20 G    Attempts:  2    Redirects:  1    CSF:  Clear    Paresthesias:  No  Assessment/Narrative:     Sensory Level:  T4

## 2019-01-24 NOTE — ED NOTES
.Pt arrived to ED with complaint of vaginal bleeding  Pt reports 36 weeks pregnant.   Pt is having contractions No.   Pt feels urge to push No.   Pt reports water broke No.   Report was called and pt was transferred to L&D Yes.   Is stating that she has been changing her pad every hour since 5Am today

## 2019-01-24 NOTE — OP NOTE
[facility]   OPERATIVE NOTE:  SECTION     Surgery Date:  2019  Surgeon(s): Nette Barton MD  Assistants:  Cheli Barber MD, PGY2    Preoperative Diagnoses:  1.  at 36w0d  2.  History of  section x1  3. PPROM  4.  labor    Postoperative diagnoses:  1.  at 36w0d, now delivered  2. Same as above  3. Placental abruption    Procedure performed:  Repeat low segment transverse  section via Pfannenstiel skin incision with double layer uterine closure    Anesthesia:  Spinal with duramorph  Est Blood Loss (mL): 626 mL  Fluid replacement: 1000 mL crystalloid.   Specimens: Placenta, cord blood  Complications: uterine atony requiring pitocin, methergine x1, hemabate x2, rectal misoprostol and TXA      Operative findings:   1. Single, viable female infant at 1742 hours on 2019. Apgars of 6 and 9 at one and five minutes.  Birth weight: pending at time of note.  Fetal presentation: OA. Amniotic fluid: bloody.    2. Blood-tinged fluid and dark clot noted behind placenta, consistent with placental abruption  3. Placenta intact with 3 vessel cord, sent to pathology  4. Normal appearing uterus, fallopian tubes. Small left ovarian cyst.   5. Minimal subcutaneous adhesions. Moderately thickened rectofascial adhesions, once taken down there was entry into the peritoneum without need for dissection in. Anterior uterine serosal adhesions to the abdominal wall requiring suture-ligation before total entry into the abdomen. No other obvious intraabdominal adhesions    Indication: Trina Rooney is a 33 year old, , who was admitted at 36w by LMP c/w 8w ultrasound for PPROM of bloody fluid and  labor. Patient has a history of  section. Discussion of TOLAC vs repeat  section was had and patient desired repeat c/s. After approximately 30min patient continued to have bloody fluid and FHT showed tachycardia, with increased  concern for abruption and therefore recommended urgent c/s. Coags were drawn and wnl on admission. The risks, benefits, and alternatives of  delivery were explained and the patient agreed to proceed.     Procedure details:  After obtaining informed consent with assistance of an Oroma , the patient was taken to the operating room. She received Ancef and Azithromycin prior to the skin incision. She was placed in the dorsal supine position with a leftward tilt and prepped and draped in the usual sterile fashion.     Following test of adequate spinal anesthesia, the abdomen was entered through a transverse skin incision through her previous scar. The skin incision was made sharply and carried through the subcutaneous tissue to the fascia.  Fascia was incised in the midline and extended laterally with the Celaya scissors.  The superior margin of the fascial incision was grasped with Kocher clamps and dissected from the underlying muscle with sharp and blunt dissecton, which was then repeated at the lower margin of the fascial incision.  The muscle was  in the midline. There were adhesions noted from the anterior wall to the anterior uterus. The adhesion was clamped and suture-ligated with 0 vicryl for entry into the abdomen    The peritoneum was entered and the opening extended by dissection with cautery with care to avoid the bladder. A bladder blade was placed. The vesicouterine peritoneum bluntly dissected and the bladder flap was created digitally. The bladder blade replaced. The lower segment of the uterus was opened sharply in a transverse fashion and extended with digital pressure. There was bloody fluid upon entry to the uterus and a small amount of dark blood was noted. The infant's head was noted to be in the OA position. It was elevated to the level of the hysterotomy and was delivered atraumatically, shoulders delivered easily thereafter. The cord was doubly clamped after 60 seconds  and cut and the infant was handed off to the waiting Critical access hospital staff. A segment of the cord was cut and set aside for cord gases if needed.     The placenta was expressed.  The uterus was exteriorized from the abdomen and cleared of all clots and debris.  The uterus was massaged and was noted to have very poor tone. Pitocin was given and methergine x1 was given, with minimal improvement. With vigorous massage uterine tone was minimally improved and therefore hemabate x1 was given along with 800mcg rectal misoprostol. The hysterotomy was repaired with 0-vicryl suture in a running locked fashion. A second layer of 0-vycryl was used to imbricate the incision and good hemostasis was achieved. Even so, there was poor uterine tone. When appropriate a second dose of hemabate was administered by anesthesia. Repeat Coags and CBC were drawn at this time, though of note, bleeding was controlled, it was just uterine atony. Tranexamic acid IV was given for improved hemostasis.  With these intervention uterine tone improved.  The bladder flap was inspected and found to be slightly oozy. The posterior cul-de-sac was irrigated and the uterus was returned to the abdomen.      The bilateral pericolic gutters were suctioned.  The hysterotomy was again inspected and found to be hemostatic after a small amt of cautery. Gel foam was placed over the hysterotomy. The abdominal wall was examined and also found to be hemostatic with only small amt of oozing.  The fascia was closed with a running suture of 0-Vicryl.  Subcutaneous tissue was irrigated. Areas that were not hemostatic were controlled with cautery. The subcutaneous tissue was reapproximated with 3-0 vicryl. The skin was closed with 4-0 monocryl. Due to the oozing at the end of the case. The patient tolerated the procedure well and was taken to the recovery room in stable condition. All sponge, needle and instrument counts were correct x2.     Dr. Batron was present for the entire  procedure.     Cheli Barber MD  Obstetrics and Gyncology, PGY-2  January 24, 2019 , 7:17 PM      Staff MD Note  I was present and scrubbed for the entire procedure noted above.  I agree with the description above and any necessary changes have been made by me.  Nette Barton MD

## 2019-01-24 NOTE — PROVIDER NOTIFICATION
19 1640   Provider Notification   Provider Name/Title Dr. Mancini   Method of Notification At Bedside   Notification Reason Bleeding   Dr. Mancini called to room to assess bright red vaginal bleeding. Decision made to proceed to OR for repeat .

## 2019-01-25 LAB
ERYTHROCYTE [DISTWIDTH] IN BLOOD BY AUTOMATED COUNT: 13.9 % (ref 10–15)
GP B STREP DNA SPEC QL NAA+PROBE: NEGATIVE
HCT VFR BLD AUTO: 31.7 % (ref 35–47)
HGB BLD-MCNC: 10.9 G/DL (ref 11.7–15.7)
HGB BLD-MCNC: 11 G/DL (ref 11.7–15.7)
MCH RBC QN AUTO: 29.6 PG (ref 26.5–33)
MCHC RBC AUTO-ENTMCNC: 34.4 G/DL (ref 31.5–36.5)
MCV RBC AUTO: 86 FL (ref 78–100)
PLATELET # BLD AUTO: 141 10E9/L (ref 150–450)
RBC # BLD AUTO: 3.68 10E12/L (ref 3.8–5.2)
SPECIMEN SOURCE: NORMAL
T PALLIDUM AB SER QL: NONREACTIVE
WBC # BLD AUTO: 8.7 10E9/L (ref 4–11)

## 2019-01-25 PROCEDURE — 25000132 ZZH RX MED GY IP 250 OP 250 PS 637: Performed by: STUDENT IN AN ORGANIZED HEALTH CARE EDUCATION/TRAINING PROGRAM

## 2019-01-25 PROCEDURE — 12000001 ZZH R&B MED SURG/OB UMMC

## 2019-01-25 PROCEDURE — 85018 HEMOGLOBIN: CPT | Performed by: STUDENT IN AN ORGANIZED HEALTH CARE EDUCATION/TRAINING PROGRAM

## 2019-01-25 PROCEDURE — 25000128 H RX IP 250 OP 636: Performed by: OBSTETRICS & GYNECOLOGY

## 2019-01-25 PROCEDURE — 36415 COLL VENOUS BLD VENIPUNCTURE: CPT | Performed by: STUDENT IN AN ORGANIZED HEALTH CARE EDUCATION/TRAINING PROGRAM

## 2019-01-25 RX ORDER — ACETAMINOPHEN 325 MG/1
650 TABLET ORAL EVERY 6 HOURS PRN
Qty: 60 TABLET | Refills: 0 | Status: SHIPPED | OUTPATIENT
Start: 2019-01-25 | End: 2019-02-24

## 2019-01-25 RX ORDER — AMOXICILLIN 250 MG
1 CAPSULE ORAL 2 TIMES DAILY PRN
Qty: 100 TABLET | Refills: 0 | Status: SHIPPED | OUTPATIENT
Start: 2019-01-25 | End: 2019-02-24

## 2019-01-25 RX ORDER — KETOROLAC TROMETHAMINE 30 MG/ML
30 INJECTION, SOLUTION INTRAMUSCULAR; INTRAVENOUS EVERY 8 HOURS
Status: DISPENSED | OUTPATIENT
Start: 2019-01-25 | End: 2019-01-26

## 2019-01-25 RX ORDER — IBUPROFEN 600 MG/1
600 TABLET, FILM COATED ORAL EVERY 6 HOURS PRN
Qty: 60 TABLET | Refills: 0 | Status: SHIPPED | OUTPATIENT
Start: 2019-01-25 | End: 2019-02-24

## 2019-01-25 RX ORDER — IBUPROFEN 600 MG/1
600 TABLET, FILM COATED ORAL EVERY 6 HOURS
Status: DISCONTINUED | OUTPATIENT
Start: 2019-01-25 | End: 2019-01-27 | Stop reason: HOSPADM

## 2019-01-25 RX ADMIN — ACETAMINOPHEN 975 MG: 325 TABLET, FILM COATED ORAL at 22:59

## 2019-01-25 RX ADMIN — ACETAMINOPHEN 975 MG: 325 TABLET, FILM COATED ORAL at 10:35

## 2019-01-25 RX ADMIN — KETOROLAC TROMETHAMINE 30 MG: 30 INJECTION, SOLUTION INTRAMUSCULAR at 10:08

## 2019-01-25 RX ADMIN — ACETAMINOPHEN 975 MG: 325 TABLET, FILM COATED ORAL at 02:27

## 2019-01-25 RX ADMIN — KETOROLAC TROMETHAMINE 30 MG: 30 INJECTION, SOLUTION INTRAMUSCULAR at 18:03

## 2019-01-25 NOTE — PLAN OF CARE
Data: Trina Rooney transferred to [id number] via cart at 2055. Baby transferred via parent's arms.  Action: Receiving unit notified of transfer: Yes. Patient and family notified of room change. Report given to Kelli MONET at 2110. Belongings sent to receiving unit. Accompanied by Registered Nurse. Oriented patient to surroundings. Call light within reach. ID bands double-checked with receiving RN.  Response: Patient tolerated transfer and is stable.

## 2019-01-25 NOTE — PROGRESS NOTES
Post Partum Progress Note  PPD#1    Subjective:  She is resting comfortably in bed this morning. Trying not to take pain medication, but did take some Tylenol this morning. Just had her rose removed, and not yet voided on her own. Ambulated already without much difficulty, no lightheadedness or dizziness. Had some water overnight, and crackers but declined to eat more than that. No yet passing flatus or had a BM. Plans to breast and bottle feed. Wants to discuss BC with primary OB    Objective:  Vitals:    19 0100 19 0200 19 0225 19 0630   BP: 104/58 99/64 105/61 93/49   Pulse:       Resp: 16 16 16 16   Temp: 99  F (37.2  C) 99.1  F (37.3  C) 99.4  F (37.4  C) 98.3  F (36.8  C)   TempSrc: Oral Oral Oral Oral   SpO2: 100% 100% 100% 99%       General: NAD, resting comfortably  CV: Regular rate, well perfused.   Pulm: Normal respiratory effort.  Abd: Soft, appropriately tender, moderately distended. Fundus is firm and at the umbilicus.    Incision: Abdominal dressing in place incision is clean, dry, intact  Ext: trace lower extremity edema bilaterally. No calf tenderness.    Assessment/Plan:  Trina Rooney is a 33 year old  female who is POD#1 s/p RLTCS in the setting of PPROM and presumed abruption, confirmed at time of surgery. C/s complicated by uterine atony, for which she received multiple uterotonics. Coags at time were wnl, repeat hgb stable on POD#0 PM    - Encourage routine post-operative goals including ambulation and incentive spirometry  - PNC: Rh pos. Rubella immune. No intervention indicated.  - Pain: controlled on oral medications. Will add NSAIDs today if hgb returns with appropriate drop from surgery  - Heme: Hgb 11.7> 626>  Hemoglobin   Date Value Ref Range Status   2019 11.0 (L) 11.7 - 15.7 g/dL Final   - GI: continue anti-emetics and stool softeners as needed.  - : Rose removed, awaiting spontaneously void.  - Infant: Stable in room  - Feeding: Plans on breat  and bottle feeding.  - BC: Plans on discussing with primary ob    Discharge to home on POD#2-3 when meeting postoperative goals. Encouraged eating and ambulation today.    Cheli Barber MD  Obstetrics and Gyncology, PGY-2  January 25, 2019 , 8:44 AM     [facility] staff:  Appreciate note by Dr. Barber.  I have seen and examined the patient without the resident. I have reviewed, edited, and agree with the note.        Gina Lundberg MD, FACOG  1/25/2019  11:18 AM

## 2019-01-25 NOTE — PLAN OF CARE
Data: POD1. Fundus firm, @ U/1. Scant lochia. Vitals stable. Ambulating well, voiding without difficulty.   Incision: Dressing intact.  HGB stable: 11.0  Tolerating diet, BM no, Flatus yes  Pain controlled with Toradol and Tylenol  Breastfeeding well and independent. Is also supplementing with formula.   No emotional concerns.   SO helping out with infant cares and supportive to patient.

## 2019-01-25 NOTE — PLAN OF CARE
VSS. Postpartum assessment WNL. Incisional dressing CDI. Denies significant pain; she is very hesitant to take any pain medication. She decided to take tylenol after ambulating to the bathroom for the first time. She tolerated ambulation fairly well without any pain medication on board. She is hesitant to advance diet but denies any nausea. She has had ice chips, water, and apple juice overnight. She declined crackers and solid foods despite encouragement. Head remains in place. Urine output adequate. She is breastfeeding with some assistance. Trina plans to breastfeed and formula feed baby at home. Hand expression demonstration completed - able to express 1/2 spoonful of colostrum. Trina should initiate pumping today to protect milk supply since baby is already supplementing with formula d/t borderline blood sugars.

## 2019-01-25 NOTE — OR NURSING
OR to PACU Transfer Note  Data: Pt to OB PACU at 1850 via cart. PIV infusing NS with 30 mu/min. without complications, rose with clear urine to gravity, temp 97.4, vs WNL, pt does not complain of pain and/or nausea.   Interventions: IV to pump, monitors and alarms on, SCD on.  Response: stable.  Plan: Patient instructed to notify RN for pain or nausea, routine post op cares, initiate breastfeeding/pumping as soon as patient/infant able.

## 2019-01-25 NOTE — ANESTHESIA PROCEDURE NOTES
Peripheral Nerve Block Procedure Note    Staff:     Anesthesiologist:  Kasandra Duffy MD    Resident/CRNA:  eJ Walker MD    Block performed by resident/CRNA in the presence of a teaching physician    Procedure Start/Stop TImes:      1/24/2019 7:30 PM     1/24/2019 7:45 PM    patient identified, IV checked, site marked, risks and benefits discussed, informed consent, monitors and equipment checked, pre-op evaluation, at physician/surgeon's request and post-op pain management      Correct Patient: Yes      Correct Position: Yes      Correct Site: Yes      Correct Procedure: Yes      Correct Laterality:  Yes    Site Marked:  Yes  Procedure details:     Procedure:  TAP    ASA:  2 and Emergent    Diagnosis:  Post operative pain    Laterality:  Bilateral    Position:  Supine    Sterile Prep: chloraprep, mask and sterile gloves      Needle:  Short bevel    Needle gauge:  21    Needle length (inches):  3.1    Ultrasound: Yes      Ultrasound used to identify targeted nerve, plexus, or vascular structure and placed a needle adjacent to it      Permanent Image entered into patiient's record      Abnormal pain on injection: No      Blood Aspirated: No      Paresthesias:  No    Bleeding at site: No      Infusion Method:  Single Shot

## 2019-01-25 NOTE — PLAN OF CARE
Patient arrived to [facility] unit via zoom cart at 2100 ,with belongings, accompanied by spouse/ significant other, with infant in arms. Received report from Danyelle Mahoney RN  and checked bands. Unit and room orientation started. Call light given and within arms reach; no concerns present at this time. Continue with plan of care.

## 2019-01-25 NOTE — ADDENDUM NOTE
Addendum  created 01/24/19 2208 by Kasandra Duffy MD    Intraprocedure Blocks edited, Sign clinical note

## 2019-01-25 NOTE — PROGRESS NOTES
Asked to attend this  delivery. Baby initially responded well to stimulation on room air, but then began having increased work of breathing and nasal flaring. C-PAP +5 provided for 12 minutes.. Lungs sounds were coarse. A total of 10cc of blood was suctioned. Lung sounds with good aeration after suctioning. Baby responded well after this and did not need anymore respiratory support.   Jenna Robledo RRT

## 2019-01-25 NOTE — ANESTHESIA POSTPROCEDURE EVALUATION
Anesthesia POST Procedure Evaluation    Patient: Trina Rooney   MRN:     [id number] Gender:   female   Age:    33 year old :      1985        Preoperative Diagnosis: pregnancy   Procedure(s):   SECTION   Postop Comments: No value filed.       Anesthesia Type:  Regional, MAC    Reportable Event: NO     PAIN: Uncomplicated   Sign Out status: Comfortable, Well controlled pain     PONV: No PONV   Sign Out status:  No Nausea or Vomiting     Neuro/Psych: Uneventful perioperative course   Sign Out Status: Preoperative baseline; Age appropriate mentation     Airway/Resp.: Uneventful perioperative course   Sign Out Status: Non labored breathing, age appropriate RR; Resp. Status within EXPECTED Parameters     CV: Uneventful perioperative course   Sign Out status: Appropriate BP and perfusion indices; Appropriate HR/Rhythm     Disposition:   Sign Out in:  PACU  Disposition:  Phase II; Home  Recovery Course: Uneventful  Follow-Up: Not required           Last Anesthesia Record Vitals:  CRNA VITALS  2019 1810 - 2019 1910      2019             Resp Rate (observed):  24          Last PACU/Preop Vitals:  Vitals:    19 2020 19   BP: 122/84  124/79   Pulse: 73  64   Resp: 21  15   Temp:  36.7  C (98.1  F)    SpO2: 99%  100%         Electronically Signed By: Kasandra Duffy MD, 2019, 8:57 PM

## 2019-01-25 NOTE — ANESTHESIA CARE TRANSFER NOTE
Patient: Mergitu Bulte    Procedure(s):   SECTION    Diagnosis: pregnancy  Diagnosis Additional Information: No value filed.    Anesthesia Type:   No value filed.     Note:  Airway :Room Air  Patient transferred to:PACU  Handoff Report: Identifed the Patient, Identified the Reponsible Provider, Reviewed the pertinent medical history, Discussed the surgical course, Reviewed Intra-OP anesthesia mangement and issues during anesthesia, Set expectations for post-procedure period and Allowed opportunity for questions and acknowledgement of understanding      Vitals: (Last set prior to Anesthesia Care Transfer)    CRNA VITALS  2019 1810 - 2019 1856      2019             Resp Rate (observed):  24                Electronically Signed By: Sahil Scott DO  2019  6:56 PM

## 2019-01-26 PROCEDURE — 25000132 ZZH RX MED GY IP 250 OP 250 PS 637: Performed by: STUDENT IN AN ORGANIZED HEALTH CARE EDUCATION/TRAINING PROGRAM

## 2019-01-26 PROCEDURE — 12000001 ZZH R&B MED SURG/OB UMMC

## 2019-01-26 RX ADMIN — ACETAMINOPHEN 975 MG: 325 TABLET, FILM COATED ORAL at 21:48

## 2019-01-26 RX ADMIN — IBUPROFEN 600 MG: 600 TABLET ORAL at 12:13

## 2019-01-26 RX ADMIN — Medication 80 MG: at 22:09

## 2019-01-26 RX ADMIN — IBUPROFEN 600 MG: 600 TABLET ORAL at 03:53

## 2019-01-26 RX ADMIN — IBUPROFEN 600 MG: 600 TABLET ORAL at 20:25

## 2019-01-26 RX ADMIN — ACETAMINOPHEN 975 MG: 325 TABLET, FILM COATED ORAL at 12:14

## 2019-01-26 RX ADMIN — DOCUSATE SODIUM AND SENNOSIDES 2 TABLET: 8.6; 5 TABLET, FILM COATED ORAL at 12:15

## 2019-01-26 NOTE — PROGRESS NOTES
Post Partum Progress Note  PPD#3    Subjective:  Doing well this morning. Pain well controlled on only tylenol and ibuprofen. Ambulating, voiding, tolerating regular diet without any issues. +flatus and BM. Ready to go home today.    Objective:  Vitals:    19 2320 19 0823 19 1600 19 2343   BP: 106/64 93/55 100/62 113/65   Pulse: 67 76 81 85   Resp: 18 18 16 18   Temp: 98.2  F (36.8  C) 97.9  F (36.6  C) 98.6  F (37  C) 98  F (36.7  C)   TempSrc: Oral Oral Oral Oral   SpO2:         General: NAD, resting comfortably  CV: Regular rate, well perfused.   Pulm: Normal respiratory effort.  Abd: Soft, appropriately tender, moderately distended and tympanic. Fundus is firm and at the umbilicus.    Incision: clean, dry, intact  Ext: trace lower extremity edema bilaterally. No calf tenderness.    Assessment/Plan:  Trina Rooney is a 33 year old  female who is POD#3 s/p RLTCS in the setting of PPROM and presumed abruption, confirmed at time of surgery. C/s complicated by uterine atony, for which she received multiple uterotonics. Coags at time were wnl, repeat hgb stable on two checks over 24hrs. Meeting postop and postpartum goals.    - Encourage routine post-operative goals including ambulation and incentive spirometry  - PNC: Rh pos. Rubella immune. No intervention indicated.  - Pain: controlled with ibuprofen and tylenol  - Heme: Hgb 11.7> 626>11.0  - GI: continue anti-emetics and stool softeners as needed.  - : Voiding  - Infant: Stable in room  - Feeding: Plans on breast and bottle feeding.  - BC: Plans on discussing with primary ob    Discharge to home today    Byron Rivera MD  OB/GYN Resident, PGY-3  2019     [facility] staff:  Appreciate note by Dr. Rivera.  I have seen and examined the patient without the resident. I have reviewed, edited, and agree with the note.        Gina Lundberg MD, FACOG  2019  10:19 AM

## 2019-01-26 NOTE — PLAN OF CARE
Patient's VSS. Patient taking ibuprofen and tylenol for pain. Patient's fundus slightly distended. Breast feeding independently.

## 2019-01-26 NOTE — PROGRESS NOTES
Post Partum Progress Note  PPD#2    Subjective:  She is resting comfortably in bed this morning. Pain well controlled. Ambulating, no lightheadedness. Tolerating regular diet, denies n/v. Passing flatus. Plans to breast and bottle feed. Wants to discuss BC with primary OB. Desires discharge tmr.    Objective:  Vitals:    19 1015 19 1600 19 2320 19 0823   BP: 96/73 101/64 106/64 93/55   Pulse:   67 76   Resp: 18 16 18 18   Temp: 98.3  F (36.8  C) 98.1  F (36.7  C) 98.2  F (36.8  C) 97.9  F (36.6  C)   TempSrc: Oral Oral Oral Oral   SpO2: 100% 100%         General: NAD, resting comfortably  CV: Regular rate, well perfused.   Pulm: Normal respiratory effort.  Abd: Soft, appropriately tender, moderately distended and tympanic. Fundus is firm and at the umbilicus.    Incision: clean, dry, intact  Ext: trace lower extremity edema bilaterally. No calf tenderness.    Assessment/Plan:  Trina Rooney is a 33 year old  female who is POD#2 s/p RLTCS in the setting of PPROM and presumed abruption, confirmed at time of surgery. C/s complicated by uterine atony, for which she received multiple uterotonics. Coags at time were wnl, repeat hgb stable on two checks over 24hrs    - Encourage routine post-operative goals including ambulation and incentive spirometry  - PNC: Rh pos. Rubella immune. No intervention indicated.  - Pain: controlled on oral medications  - Heme: Hgb 11.7> 626>11.0  - GI: continue anti-emetics and stool softeners as needed.  - : Voiding  - Infant: Stable in room  - Feeding: Plans on breast and bottle feeding.  - BC: Plans on discussing with primary ob    Discharge to home tomorrow likely    Violette Cr MD PGY3  19 8:58 AM    [facility] staff:  Appreciate note by Dr. Cr.  I have seen and examined the patient without the resident. I have reviewed, edited, and agree with the note. Patient was seen with professional [place] .   Diane THOMPSON  MD [place]  1/26/2019  11:43 AM

## 2019-01-26 NOTE — PLAN OF CARE
Patient's vss. Postpartum checks WDL. Patient is ambulating in room independently. Patient's pain well managed with Ibuprofen and Tylenol. Bonding well with infant. Incision YRIS, no signs of inflammation noted. Plan of acre reviewed with patient, will continue to monitor.

## 2019-01-26 NOTE — LACTATION NOTE
This note was copied from a baby's chart.  Consult for LPT infant, breast and bottle feeding with formula.  C/S delivery last evening at 36w0d, AGA infant at 5# 13 oz. birth weight, good diaper output & BG ok thus far, last two within range but not high enough to complete monitoring series. No medical concerns related to milk supply listed on prenatal.     Mom sleeping at time of visit. Please review LPT feeding recommendations including rationale for nipple shield use in early weeks, feeding on cue but no longer than 3 hours between, limit length of time at breast to prevent excessive fatigue, supplements suggested after feedings according to guidelines below, and encourage to hand express after each feeding, pump at least 6 to 8 times daily to help establish full supply. Recommend hospital grade rental pump at discharge (late  & less than 6 pounds) Please demonstrate paced bottle feeding and have mom make appt. With lactation consultant within a week of discharge, see discharge instructions also.    Supplement Guidelines for infants <37 weeks gestation or < 6 lbs at birth  Birth-24 hours of age: 5ml (1 tsp) every 2 - 3 hours, at least 8 times in 24 hours   24-48 hours of age: 10 ml (2 tsp) every 2 - 3 hours, at least 8 times in 24 hours   48-72 hours of age: 15 ml (3 tsp) every 2 - 3 hours, at least 8 times in 24 hours  > 72 hours: Refer to you baby's provider for supplement instructions    -  Paced bottle feeding with a slow flow nipple is one of the  recommended methods of  supplementation for late  infants after discharge from the hospital due to larger supplement volumes.

## 2019-01-27 VITALS
SYSTOLIC BLOOD PRESSURE: 115 MMHG | HEART RATE: 68 BPM | OXYGEN SATURATION: 100 % | DIASTOLIC BLOOD PRESSURE: 73 MMHG | TEMPERATURE: 98.6 F | RESPIRATION RATE: 16 BRPM

## 2019-01-27 PROCEDURE — 25000132 ZZH RX MED GY IP 250 OP 250 PS 637: Performed by: STUDENT IN AN ORGANIZED HEALTH CARE EDUCATION/TRAINING PROGRAM

## 2019-01-27 RX ADMIN — DOCUSATE SODIUM AND SENNOSIDES 2 TABLET: 8.6; 5 TABLET, FILM COATED ORAL at 09:53

## 2019-01-27 RX ADMIN — ACETAMINOPHEN 975 MG: 325 TABLET, FILM COATED ORAL at 09:56

## 2019-01-27 RX ADMIN — IBUPROFEN 600 MG: 600 TABLET ORAL at 09:56

## 2019-01-27 NOTE — PLAN OF CARE
VSS. PP assessments wnl. Pain well controlled with oral meds. Pt likes to request pain meds when she wants rather than scheduled time.Encouraged pt ambulate. Abdomen is slightly distended.  Simethicone given x1. No Bm yet. Passing flatus. Breastfeeding and supplementing infant. Bonding with infant. Will continue with plan of care.

## 2019-01-27 NOTE — PLAN OF CARE
Rental breast pump given. Paperwork signed. Family instructed on where to call to set up payment and where to return pump when done with rental.

## 2019-01-27 NOTE — PLAN OF CARE
Data: Vital signs  and postpartum checks within normal limits - see flow record. Patient eating and drinking normally. Patient able to empty bladder independently and is up ambulating in the room. Incision is intact, but there is small dry blood on the sterile strips on the left side of the incision. No apparent signs of infection and is healing well. Pt is breastfeeding, but no latch observed by staff. Pt is pumping with encouragement and is getting up to 23 ml.  Patient performing self cares and needs some reinforcement with baby's feeding.   Action: Patient medicated during the shift for pain control. Encouraged and assist pt with pumping. Pt encouraged to pump after breastfeeding baby. Patient assessed for pain. Assisted pt with baby's feeding and reinforced supplementing baby after each feeding with the expressed breast milk. Reviewed teaching and discharge instructions with pt using the . Pt was given the rental breast pump, discharge medications and copies of the discharge instruction.   Response: Positive attachment behaviors observed with infant. Support persons present.  Plan: pt discharge home today with baby.

## 2019-01-30 LAB — COPATH REPORT: NORMAL

## 2020-06-04 ENCOUNTER — PRENATAL OFFICE VISIT (OUTPATIENT)
Dept: NURSING | Facility: CLINIC | Age: 35
End: 2020-06-04

## 2020-06-04 VITALS — BODY MASS INDEX: 24.11 KG/M2 | WEIGHT: 150 LBS | HEIGHT: 66 IN

## 2020-06-04 DIAGNOSIS — Z34.90 SUPERVISION OF NORMAL PREGNANCY: Primary | ICD-10-CM

## 2020-06-04 PROBLEM — Z78.9 NONIMMUNE TO HEPATITIS B VIRUS: Status: ACTIVE | Noted: 2018-08-10

## 2020-06-04 PROCEDURE — 99207 ZZC NO CHARGE NURSE ONLY: CPT

## 2020-06-04 SDOH — SOCIAL STABILITY: SOCIAL INSECURITY: WITHIN THE LAST YEAR, HAVE YOU BEEN AFRAID OF YOUR PARTNER OR EX-PARTNER?: NO

## 2020-06-04 SDOH — SOCIAL STABILITY: SOCIAL INSECURITY
WITHIN THE LAST YEAR, HAVE TO BEEN RAPED OR FORCED TO HAVE ANY KIND OF SEXUAL ACTIVITY BY YOUR PARTNER OR EX-PARTNER?: NO

## 2020-06-04 SDOH — SOCIAL STABILITY: SOCIAL INSECURITY: WITHIN THE LAST YEAR, HAVE YOU BEEN HUMILIATED OR EMOTIONALLY ABUSED IN OTHER WAYS BY YOUR PARTNER OR EX-PARTNER?: NO

## 2020-06-04 SDOH — SOCIAL STABILITY: SOCIAL INSECURITY
WITHIN THE LAST YEAR, HAVE YOU BEEN KICKED, HIT, SLAPPED, OR OTHERWISE PHYSICALLY HURT BY YOUR PARTNER OR EX-PARTNER?: NO

## 2020-06-04 SDOH — SOCIAL STABILITY: SOCIAL NETWORK: ARE YOU MARRIED, WIDOWED, DIVORCED, SEPARATED, NEVER MARRIED, OR LIVING WITH A PARTNER?: MARRIED

## 2020-06-04 SDOH — HEALTH STABILITY: MENTAL HEALTH
STRESS IS WHEN SOMEONE FEELS TENSE, NERVOUS, ANXIOUS, OR CAN'T SLEEP AT NIGHT BECAUSE THEIR MIND IS TROUBLED. HOW STRESSED ARE YOU?: NOT AT ALL

## 2020-06-04 ASSESSMENT — MIFFLIN-ST. JEOR: SCORE: 1402.15

## 2020-06-04 NOTE — PROGRESS NOTES
Important Information for Provider:     New ob nurse intake by phone, fourth pregnancy, late care. Previous pregnancy was  at 36 weeks, placenta abruption delivered by C section 2019.  Handouts reviewed. Patient is fearful of going out of her house due to Covid/ protests. She does work evenings as a . Has NOB with Dr Toledo 2020. Give NOB book/handouts.    Caffeine intake/servings daily - 0  Calcium intake/servings daily - 3  Exercise 5 times weekly - describe ; walks, works, precautions given   Sunscreen used - Yes  Seatbelts used - Yes  Guns stored in the home - No  Self Breast Exam - Yes  Pap test up to date -  Yes 2017, normal , negative HPV done thru [facility]  Eye exam up to date -  Yes  Dental exam up to date -  Yes  Immunizations reviewed and up to date - Yes  Abuse: Current or Past (Physical, Sexual or Emotional) - No  Do you feel safe in your environment - No due to Covid, protesting, otherwise feels safe at home with family  Do you cope well with stress - Yes  Do you suffer from insomnia - No         Prenatal OB Questionnaire  Patient supplied answers from flow sheet for:  Prenatal OB Questionnaire.  Past Medical History  Diabetes?: No  Hypertension : No  Heart disease, mitral valve prolapse or rheumatic fever?: No  An autoimmune disease such as lupus or rheumatoid arthritis?: No  Kidney disease or urinary tract infection?: No  Epilepsy, seizures or spells?: No  Migraine headaches?: No  A stroke or loss of function or sensation?: No  Any other neurological problems?: No  Have you ever been treated for depression?: No  Are you having problems with crying spells or loss of self-esteem?: No  Have you ever required psychiatric care?: No  Have you ever had hepatitis, liver disease or jaundice?: No  Have you been treated for blood clots in your veins, deep vein thromosis, inflammation in the veins, thrombosis, phlebitis, pulmonary embolism or varicosities?: No  Have you had  excessive bleeding after surgery or dental work?: No  Do you bleed more than other women after a cut or scratch?: No  Do you have a history of anemia?: No  Have you ever had thyroid problems or taken thyroid medication?: No   Do you have any endocrine problems?: No  Have you ever been in a major accident or suffered serious trauma?: No  Within the last year, has anyone hit, slapped, kicked or otherwise hurt you?: No  In the last year, has anyone forced you to have sex when you didn't want to?: No    Past Medical History 2   Have you ever received a blood transfusion?: No  Would you refuse a blood transfusion if a doctor judged it to be medically necessary?: No   If you answered Yes, would you rather die than receive a blood transfusion?: No  If you answered Yes, is this for Jain reasons?: No  Does anyone in your home smoke?: No  Do you use tobacco products?: No  Do you drink beer, wine or hard liquor?: No  Do you use any of the following: marijuana, speed, cocaine, heroin, hallucinogens or other drugs?: No   Is your blood type Rh negative?: No  Have you ever had abnormal antibodies in your blood?: No  Have you ever had asthma?: No  Have you ever had tuberculosis?: No  Do you have any allergies to drugs or over-the-counter medications?: No  Allergies: Dust Mites, Aspartame, Ethanol, Venlafaxine, Hydrochloride, Sertraline: No  Have you had any breast problems?: No  Have you ever ?: (!) Yes  Have you had any gynecological surgical procedures such as cervical conization, a LEEP procedure, laser treatment, cryosurgery of the cervix or a dilation and curettage, etc?: No  Have you ever had any other surgical procedures?: (!) Yes 2  C sections  Have you been hospitalized for a nonsurgical reason excluding normal delivery?: No  Have you ever had any anesthetic complications?: No  Have you ever had an abnormal pap smear?: No    Past Medical History (Continued)  Do you have a history of abnormalities of the  uterus?: No  Did your mother take DENG or any other hormones when she was pregnant with you?: No  Did it take you more than a year to become pregnant?: No  Have you ever been evaluated or treated for infertility?: No  Is there a history of medical problems in your family, which you feel may be important to this pregnancy?: No  Do you have any other problems we have not asked about which you feel may be important to this pregnancy?: History of H plylori gastrointestinal infection    Symptoms since last menstrual period  Do you have any of the following symptoms: abdominal pain, blood in stools or urine, chest pain, shortness of breath, coughing or vomiting up blood, your heart racing or skipping beats, nausea and vomiting, pain on urination or vaginal discharge or bleed: (!) Yes  Will the patient be 35 years old or older at the time of delivery?: No    Has the patient, baby's father or anyone in either family had:  Thalassemia (Italian, Greek, Mediterranean or  background only) and an MCV result less than 80?: No  Neural tube defect such as meningomyelocele, spina bifida or anencephaly?: No  Congenital heart defect?: No  Down's Syndrome?: No  Fabio-Sachs disease (Alevism, Cajun, Tuvaluan-Palauan)?: No  Sickle cell disease or trait ()?: No  Hemophilia or other inherited problems of blood?: No  Muscular dystrophy?: No  Cystic fibrosis?: No  Isidro's chorea?: No  Mental retardation/autism?: No  If yes, was the person tested for fragile X?: No  Any other inherited genetic or chromosomal disorder?: No  Maternal metabolic disorder (e.g Insulin-dependent diabetes, PKU)?: No  A child with birth defects not listed above?: No  Recurrent pregnancy loss or stillbirth?: No   Has the patient had any medications/street drugs/alcohol since her last menstrual period?: No  Does the patient or baby's father have any other genetic risks?: No    Infection History   Do you object to being tested for Hepatitis B?: No  Do you  object to being tested for HIV?: No   Do you feel that you are at high risk for coming in contact with the AIDS virus?: No  Have you ever been treated for tuberculosis?: (!) Yes  Have you ever had a positive skin test for tuberculosis?: (!) Yes  Do you live with someone who has tuberculosis?: No  Have you ever been exposed to tuberculosis?: No  Do you have genital herpes?: No  Does your partner have genital herpes?: No  Have you had a viral illness since your last period?: No  Have you ever had gonorrhea, chlamydia, syphilis, venereal warts, trichomoniasis, pelvic inflammatory disease or any other sexually transmitted disease?: No  Do you know if you are a genital group B streptococcus carrier?: No  Have you had chicken pox/varicella?: No   Have you been vaccinated against chicken Pox?: No  Have you had any other infectious diseases?: No      Allergies as of 6/4/2020:    Allergies as of 06/04/2020     (No Known Allergies)       Current medications are:  Current Outpatient Medications   Medication Sig Dispense Refill     Cholecalciferol (VITAMIN D) 2000 UNITS tablet Take 2,000 Units by mouth       Prenatal Vit-Fe Fumarate-FA (PRENATAL VITAMIN) 27-0.8 MG TABS Take 1 tablet by mouth daily 90 tablet 3         Early ultrasound screening tool:    Does patient have irregular periods?  No  Did patient use hormonal birth control in the three months prior to positive urine pregnancy test? No  Is the patient breastfeeding?  No  Is the patient 10 weeks or greater at time of education visit?  Yes

## 2020-06-08 ENCOUNTER — PRENATAL OFFICE VISIT (OUTPATIENT)
Dept: OBGYN | Facility: CLINIC | Age: 35
End: 2020-06-08
Payer: COMMERCIAL

## 2020-06-08 VITALS
WEIGHT: 170.1 LBS | TEMPERATURE: 98.3 F | HEART RATE: 76 BPM | DIASTOLIC BLOOD PRESSURE: 74 MMHG | BODY MASS INDEX: 27.45 KG/M2 | SYSTOLIC BLOOD PRESSURE: 114 MMHG

## 2020-06-08 DIAGNOSIS — Z12.4 PAP SMEAR FOR CERVICAL CANCER SCREENING: ICD-10-CM

## 2020-06-08 DIAGNOSIS — E55.9 VITAMIN D DEFICIENCY: ICD-10-CM

## 2020-06-08 DIAGNOSIS — Z34.80 SUPERVISION OF OTHER NORMAL PREGNANCY, ANTEPARTUM: Primary | ICD-10-CM

## 2020-06-08 DIAGNOSIS — L30.9 ECZEMA, UNSPECIFIED TYPE: ICD-10-CM

## 2020-06-08 PROBLEM — Z98.891 S/P CESAREAN SECTION: Status: RESOLVED | Noted: 2019-01-24 | Resolved: 2020-06-08

## 2020-06-08 LAB
ABO + RH BLD: NORMAL
ABO + RH BLD: NORMAL
ALBUMIN UR-MCNC: NEGATIVE MG/DL
APPEARANCE UR: CLEAR
BILIRUB UR QL STRIP: NEGATIVE
BLD GP AB SCN SERPL QL: NORMAL
BLOOD BANK CMNT PATIENT-IMP: NORMAL
COLOR UR AUTO: YELLOW
ERYTHROCYTE [DISTWIDTH] IN BLOOD BY AUTOMATED COUNT: 13.5 % (ref 10–15)
GLUCOSE UR STRIP-MCNC: NEGATIVE MG/DL
HCT VFR BLD AUTO: 36.9 % (ref 35–47)
HGB BLD-MCNC: 12.5 G/DL (ref 11.7–15.7)
HGB UR QL STRIP: NEGATIVE
KETONES UR STRIP-MCNC: NEGATIVE MG/DL
LEUKOCYTE ESTERASE UR QL STRIP: NEGATIVE
MCH RBC QN AUTO: 30.3 PG (ref 26.5–33)
MCHC RBC AUTO-ENTMCNC: 33.9 G/DL (ref 31.5–36.5)
MCV RBC AUTO: 89 FL (ref 78–100)
NITRATE UR QL: NEGATIVE
PH UR STRIP: 7 PH (ref 5–7)
PLATELET # BLD AUTO: 151 10E9/L (ref 150–450)
RBC # BLD AUTO: 4.13 10E12/L (ref 3.8–5.2)
SOURCE: NORMAL
SP GR UR STRIP: 1.01 (ref 1–1.03)
SPECIMEN EXP DATE BLD: NORMAL
UROBILINOGEN UR STRIP-ACNC: 0.2 EU/DL (ref 0.2–1)
WBC # BLD AUTO: 4.7 10E9/L (ref 4–11)

## 2020-06-08 PROCEDURE — 86762 RUBELLA ANTIBODY: CPT | Performed by: OBSTETRICS & GYNECOLOGY

## 2020-06-08 PROCEDURE — 87389 HIV-1 AG W/HIV-1&-2 AB AG IA: CPT | Performed by: OBSTETRICS & GYNECOLOGY

## 2020-06-08 PROCEDURE — 86780 TREPONEMA PALLIDUM: CPT | Performed by: OBSTETRICS & GYNECOLOGY

## 2020-06-08 PROCEDURE — 87340 HEPATITIS B SURFACE AG IA: CPT | Performed by: OBSTETRICS & GYNECOLOGY

## 2020-06-08 PROCEDURE — 86850 RBC ANTIBODY SCREEN: CPT | Performed by: OBSTETRICS & GYNECOLOGY

## 2020-06-08 PROCEDURE — 82306 VITAMIN D 25 HYDROXY: CPT | Performed by: OBSTETRICS & GYNECOLOGY

## 2020-06-08 PROCEDURE — 81003 URINALYSIS AUTO W/O SCOPE: CPT | Performed by: OBSTETRICS & GYNECOLOGY

## 2020-06-08 PROCEDURE — 81511 FTL CGEN ABNOR FOUR ANAL: CPT | Mod: 90 | Performed by: OBSTETRICS & GYNECOLOGY

## 2020-06-08 PROCEDURE — 36415 COLL VENOUS BLD VENIPUNCTURE: CPT | Performed by: OBSTETRICS & GYNECOLOGY

## 2020-06-08 PROCEDURE — 86901 BLOOD TYPING SEROLOGIC RH(D): CPT | Performed by: OBSTETRICS & GYNECOLOGY

## 2020-06-08 PROCEDURE — 87624 HPV HI-RISK TYP POOLED RSLT: CPT | Performed by: OBSTETRICS & GYNECOLOGY

## 2020-06-08 PROCEDURE — 99000 SPECIMEN HANDLING OFFICE-LAB: CPT | Performed by: OBSTETRICS & GYNECOLOGY

## 2020-06-08 PROCEDURE — 85027 COMPLETE CBC AUTOMATED: CPT | Performed by: OBSTETRICS & GYNECOLOGY

## 2020-06-08 PROCEDURE — G0145 SCR C/V CYTO,THINLAYER,RESCR: HCPCS | Performed by: OBSTETRICS & GYNECOLOGY

## 2020-06-08 PROCEDURE — 99207 ZZC FIRST OB VISIT: CPT | Performed by: OBSTETRICS & GYNECOLOGY

## 2020-06-08 PROCEDURE — 87086 URINE CULTURE/COLONY COUNT: CPT | Performed by: OBSTETRICS & GYNECOLOGY

## 2020-06-08 PROCEDURE — 86900 BLOOD TYPING SEROLOGIC ABO: CPT | Performed by: OBSTETRICS & GYNECOLOGY

## 2020-06-08 RX ORDER — HYDROCORTISONE VALERATE CREAM 2 MG/G
CREAM TOPICAL 2 TIMES DAILY
Qty: 45 G | Refills: 3 | Status: SHIPPED | OUTPATIENT
Start: 2020-06-08 | End: 2020-06-09

## 2020-06-08 RX ORDER — CHOLECALCIFEROL (VITAMIN D3) 50 MCG
1 TABLET ORAL DAILY
Qty: 90 TABLET | Refills: 3 | Status: SHIPPED | OUTPATIENT
Start: 2020-06-08

## 2020-06-08 NOTE — LETTER
June 17, 2020    Trina Rooney  [address]  [facility] [address]    Dear ,  This letter is regarding your recent Pap smear (cervical cancer screening) and Human Papillomavirus (HPV) test.  We are happy to inform you that your Pap smear result is normal. Cervical cancer is closely linked with certain types of HPV. Your results showed no evidence of high-risk HPV.  We recommend you have your next PAP smear and HPV test in 5 years.  You will still need to return to the clinic every year for an annual exam and other preventive tests.  If you have additional questions regarding this result, please call our registered nurse, Sujey at [phone].  Sincerely,    NOA FLOREZ MD //[facility]

## 2020-06-08 NOTE — PROGRESS NOTES
Labs and pap smear today. Speaks english and no  needed today. Feeling well and can feel FM now. Quad test today and plan u/s with next visit. Providers/residents, weight gain/exercise, delivery discussed. RTC 4 weeks. BE    HPI:  Trina Rooney is a 34 year old female Patient's last menstrual period was 2020. at 16w4d, Estimated Date of Delivery: 2020.  She denies vaginal bleeding and abdominal pain. Has not sought care until now as scared to go out. Cycles were 26-28 days long       Has itching spot on finger with scaley skin like her daughter.     Past Medical History:   Diagnosis Date     H. pylori infection 2013     History of positive PPD      Latent tuberculosis      Placenta abruptio        Past Surgical History:   Procedure Laterality Date     C/SECTION, LOW TRANSVERSE N/A 2014    , Low Transverse      SECTION N/A 2019    Procedure:  SECTION;  Surgeon: Adalgisa Gaona MD;  Location:  L+D       Allergies: Patient has no known allergies.     EXAM:  Blood pressure 114/74, pulse 76, temperature 98.3  F (36.8  C), temperature source Oral, weight 77.2 kg (170 lb 1.6 oz), last menstrual period 2020, unknown if currently breastfeeding.   BMI= Body mass index is 27.45 kg/m .  General - pleasant female in no acute distress.  Lungs - clear to auscultation bilaterally.  Heart - regular rate and rhythm without murmur.  Breast - deferred  Abdomen - soft, nontender, nondistended, no hepatosplenomegaly.  Pelvic - EG: normal adult female, BUS: within normal limits, Vagina: well rugated, no discharge, Cervix: no lesions or CMT, closed/long Uterus: gravid, consistant with dates, mobile, Adnexae: no masses or tenderness.  Rectovaginal - deferred.  Musculoskeletal - no gross deformities.  Neurological - normal strength, sensation, and mental status.    Doptones were 160    ASSESSMENT/PLAN:  (Z34.80) Supervision of other normal pregnancy,  antepartum  (primary encounter diagnosis)  Comment: dates by LMP  Plan: Maternal Quad Marker 2nd Trimester, US OB > 14         Weeks        Check quad today and plan u/s with next visit. Discussed planned repeat c/s at 39 weeks with h/o 2 prior c/s. No questions.      (E55.9) Vitamin D deficiency  Comment: low in past  Plan: vitamin D3 (CHOLECALCIFEROL) 2000 units (50         mcg) tablet        Check lab today and script done since has not been taking any medications.    (L30.9) Eczema, unspecified type  Comment: on finger  Plan: hydrocortisone (WESTCORT) 0.2 % external cream        Will try this cream and if doesn't work, we can do another one.    (Z12.4) Pap smear for cervical cancer screening  Plan: Pap imaged thin layer screen with HPV -         recommended age 30 - 65 years (select HPV order        below), HPV High Risk Types DNA Cervical        Discussed sending pap smear for HPV typing as well and that if both pap and HPV are negative, then can have q5 year pap smears.    Weight gain and exercise during pregnancy was discussed at today's visit.  The patient will return to clinic in 4 weeks for continued prenatal care.

## 2020-06-08 NOTE — NURSING NOTE
"Chief Complaint   Patient presents with     Prenatal Care     16+4       Initial /74   Pulse 76   Temp 98.3  F (36.8  C) (Oral)   Wt 77.2 kg (170 lb 1.6 oz)   LMP 2020   BMI 27.45 kg/m   Estimated body mass index is 27.45 kg/m  as calculated from the following:    Height as of 20: 1.676 m (5' 6\").    Weight as of this encounter: 77.2 kg (170 lb 1.6 oz).  BP completed using cuff size: regular    Questioned patient about current smoking habits.  Pt. has never smoked.          The following HM Due: pap smear      The following patient reported/Care Every where data was sent to:  P ABSTRACT QUALITY INITIATIVES [06162]  Pap smear done on this date: 17 (approximately), by this group: [facility], results were Normal.       patient has appointment for today  Shellie Jaime                "

## 2020-06-08 NOTE — LETTER
June 9, 2020      Trina Rooney  [address]  [facility] [address]        Dear ,    We are writing to inform you of your test results.    Your test results fall within the expected range(s), except your vitamin D level is low.  A prescription has been sent to your pharmacy.  Please continue with current treatment plan.    Resulted Orders   Vitamin D Deficiency   Result Value Ref Range    Vitamin D Deficiency screening 14 (L) 20 - 75 ug/L      Comment:      Season, race, dietary intake, and treatment affect the concentration of   25-hydroxy-Vitamin D. Values may decrease during winter months and increase   during summer months. Values 20-29 ug/L may indicate Vitamin D insufficiency   and values <20 ug/L may indicate Vitamin D deficiency.  Vitamin D determination is routinely performed by an immunoassay specific for   25 hydroxyvitamin D3.  If an individual is on vitamin D2 (ergocalciferol)   supplementation, please specify 25 OH vitamin D2 and D3 level determination by   LCMSMS test [id number].     Urine Culture Aerobic Bacterial   Result Value Ref Range    Specimen Description Midstream Urine     Culture Micro       Referred to [facility], await final report.   Treponema Abs w Reflex to RPR and Titer   Result Value Ref Range    Treponema Antibodies Nonreactive NR^Nonreactive      Comment:      Methodology Change: Test performed on the [employer] Liaison XL by Treponema   pallidum Total Antibodies Assay as of 3.17.2020.     Rubella Antibody IgG Quantitative   Result Value Ref Range    Rubella Antibody IgG Quantitative 9 IU/mL      Comment:      Equivocal, please recollect.  Reference Range:    Unvaccinated Negative 0-7 IU/mL  Vaccinated or previous exposure Positive 10 IU/ml or greater     HIV Antigen Antibody Combo   Result Value Ref Range    HIV Antigen Antibody Combo Nonreactive NR^Nonreactive          Comment:      HIV-1 p24 Ag & HIV-1/HIV-2 Ab Not Detected    CBC with platelets   Result Value Ref Range    WBC 4.7 4.0 - 11.0 10e9/L    RBC Count 4.13 3.8 - 5.2 10e12/L    Hemoglobin 12.5 11.7 - 15.7 g/dL    Hematocrit 36.9 35.0 - 47.0 %    MCV 89 78 - 100 fl    MCH 30.3 26.5 - 33.0 pg    MCHC 33.9 31.5 - 36.5 g/dL    RDW 13.5 10.0 - 15.0 %    Platelet Count 151 150 - 450 10e9/L   Hepatitis B surface antigen   Result Value Ref Range    Hep B Surface Agn Nonreactive NR^Nonreactive   ABO/Rh type and screen   Result Value Ref Range    ABO O     RH(D) Pos     Antibody Screen Neg     Test Valid Only At          [facility]    Specimen Expires 06/11/2020        If you have any questions or concerns, please call the clinic at the number listed above.       Sincerely,        NOA FLOREZ MD

## 2020-06-08 NOTE — LETTER
June 17, 2020      Mergalex Rooney  [address]  [facility] [address]        Dear ,    We are writing to inform you of your test results.    {results letter list:003912}    Resulted Orders   Maternal Quad Marker 2nd Trimester   Result Value Ref Range    Pt Date of Birth 1985     Patient Weight 170 lbs 1.6oz     Weight Units POUNDS     Due Date 11/19/2020     Dating Method LMP     Last Mens Period 02/13/2020     Number of Fetuses PASTRANA     Monochorionic Twins No     Race of Mother      Diabetic at Conception No     Smoking Status No     Valproic/Carbamazepine Status No     Previous Trisomy Pregnancy NO     Fam History Of NTD No     In Vitro Fertilization Egg Donor Age No     Repeat Specimen No     Alpha Feto Protein 58 ng/mL    MoM for AFP 1.63     Estriol 0.58 ng/mL    MoM uE3 0.53     hCG 2nd Trimester 18,858 IU/L    MoM hCG 2nd Trimester 0.55     Dimeric Inhibin A 173 pg/mL    MoM Dimeric Inhibin A 1.19     AFP Interpretation Screen Neg       Comment:      (Note)  INTERPRETATION: SCREEN NEGATIVE                               Neural Tube Defects (NTD)   Negative       Down syndrome (DS)          Negative       Trisomy 18 (T18)            Negative                                                               Pre-Test     Post-Test      Cutoff                                      Neural Tube Defects Risks   1:1030       1:2020       1:250           Down Syndrome Risks         1:395        < 1:74198    1:150           Trisomy 18 Risks            1:1540       1:374        1:100                                        Comments:                                                  The risk of an open neural tube defect is less than the  screening cut-off.                                         The risk of Down syndrome is less than the screening  cut-off.                                                   The risk of trisomy 18 is less than the screening cut-off.  Test developed  and characteristics determined by [facility]. See Compliance S  tatement B: [facility]/CS      M[age] Delivery 35.0 yr    Patient Weight 170.0 lbs.     Estimated Due Date SEE NOTE       Comment:      (Note)  Results for Estimated Due Date: 11-19-20       Gestational Age Calculated 16 wks, 4 days     Dating Other     Num of Fetuses Riley     Maternal Race Black     Insulin Diabetic No     Smoking Status No     Fam History of NTD No     Family History of Aneuploidy No     Specimen Iteration SEE NOTE       Comment:      (Note)  Initial sample      Electronic Enhanced Report SEE NOTE       Comment:      (Note)  Access PLDT Enhanced Report using either link below:  -Direct access:   [email or web address]  -Enter Username, Password: [email or web address]  Username: [email or web address]  Password: [email or web address]  Performed by [facility],  [address] [place] [address] [phone]  [email or web address], Med Bustillo MD, Lab. Director     Vitamin D Deficiency   Result Value Ref Range    Vitamin D Deficiency screening 14 (L) 20 - 75 ug/L      Comment:      Season, race, dietary intake, and treatment affect the concentration of   25-hydroxy-Vitamin D. Values may decrease during winter months and increase   during summer months. Values 20-29 ug/L may indicate Vitamin D insufficiency   and values <20 ug/L may indicate Vitamin D deficiency.  Vitamin D determination is routinely performed by an immunoassay specific for   25 hydroxyvitamin D3.  If an individual is on vitamin D2 (ergocalciferol)   supplementation, please specify 25 OH vitamin D2 and D3 level determination by   LCMSMS test [id number].     Urine Culture Aerobic Bacterial   Result Value Ref Range    Specimen Description Midstream Urine     Culture Micro       <10,000 colonies/mL  urogenital joo  Susceptibility testing not routinely done     Treponema Abs w Reflex to RPR and Titer   Result Value Ref Range    Treponema Antibodies  Nonreactive NR^Nonreactive      Comment:      Methodology Change: Test performed on the [employer] Liaison XL by Treponema   pallidum Total Antibodies Assay as of 3.17.2020.     Rubella Antibody IgG Quantitative   Result Value Ref Range    Rubella Antibody IgG Quantitative 9 IU/mL      Comment:      Equivocal, please recollect.  Reference Range:    Unvaccinated Negative 0-7 IU/mL  Vaccinated or previous exposure Positive 10 IU/ml or greater     HIV Antigen Antibody Combo   Result Value Ref Range    HIV Antigen Antibody Combo Nonreactive NR^Nonreactive          Comment:      HIV-1 p24 Ag & HIV-1/HIV-2 Ab Not Detected   CBC with platelets   Result Value Ref Range    WBC 4.7 4.0 - 11.0 10e9/L    RBC Count 4.13 3.8 - 5.2 10e12/L    Hemoglobin 12.5 11.7 - 15.7 g/dL    Hematocrit 36.9 35.0 - 47.0 %    MCV 89 78 - 100 fl    MCH 30.3 26.5 - 33.0 pg    MCHC 33.9 31.5 - 36.5 g/dL    RDW 13.5 10.0 - 15.0 %    Platelet Count 151 150 - 450 10e9/L   Hepatitis B surface antigen   Result Value Ref Range    Hep B Surface Agn Nonreactive NR^Nonreactive   ABO/Rh type and screen   Result Value Ref Range    ABO O     RH(D) Pos     Antibody Screen Neg     Test Valid Only At          [facility]    Specimen Expires 06/11/2020    Pap imaged thin layer screen with HPV - recommended age 30 - 65 years (select HPV order below)   Result Value Ref Range    PAP NIL     Copath Report         Patient Name: FARIBA GRISSOM  MR#: [id number]  Specimen #: [id number]  Collected: 6/8/2020  Received: 6/9/2020  Reported: 6/10/2020 11:59  Ordering Phy(s): NOA FLOREZ    For improved result formatting, select 'View Enhanced Report Format' under   Linked Documents section.    SPECIMEN/STAIN PROCESS:  Pap imaged thin layer prep screening (Surepath, FocalPoint with guided   screening)       Pap-Cyto x 1, HPV ordered x 1    SOURCE: Cervical,  endocervical  ----------------------------------------------------------------   Pap imaged thin layer prep screening (Surepath, FocalPoint with guided   screening)  SPECIMEN ADEQUACY:  Satisfactory for evaluation.  -Transformation zone component absent.    CYTOLOGIC INTERPRETATION:    Negative for intraepithelial lesion or malignancy    Electronically signed out by:  NADINE Bautista (ASCP)    CLINICAL HISTORY:  LMP: 2/13/20  Pregnant, A previous normal pap  Date of Last Pap: 3/3/2015,    Papanicolaou Test Limitations:  Cervical cytology is  a screening test with   limited sensitivity; regular  screening is critical for cancer prevention; Pap tests are primarily   effective for the diagnosis/prevention of  squamous cell carcinoma, not adenocarcinomas or other cancers.    COLLECTION SITE:  Client:  [facility]  Location: [facility] (B)    The technical component of this testing was completed at the [facility], with the professional component performed   at the [facility], [address] [place] [phone] ([phone])       HPV High Risk Types DNA Cervical   Result Value Ref Range    HPV Source SurePath     HPV 16 DNA Negative NEG^Negative    HPV 18 DNA Negative NEG^Negative    Other HR HPV Negative NEG^Negative    Final Diagnosis This patient's sample is negative for HPV DNA.       Comment:      This test was developed and its performance characteristics determined by the   [facility], Molecular Diagnostics Laboratory. It   has not been cleared or approved by the FDA. The laboratory is regulated under   CLIA as qualified to perform high-complexity testing. This test is used for   clinical purposes. It should not be regarded as investigational or for   research.  (Note)  METHODOLOGY:  The Roche pablo 4800 system uses automated  extraction,   simultaneous amplification of HPV (L1 region) and beta-globin,    followed by  real time detection of fluorescent labeled HPV and beta   globin using specific oligonucleotide probes . The test specifically   identifies types HPV 16 DNA and HPV 18 DNA while concurrently   detecting the rest of the high risk types (31, 33, 35, 39, 45, 51,   52, 56, 58, 59, 66 or 68).  COMMENTS:  This test is not intended for use as a screening device   for women under age 30 with normal cervical cytology.  Results should   be correl  ated with cytologic and histologic findings. Close clinical   followup is recommended.      Specimen Description Cervical Cells       Comment:      C20  [address]         If you have any questions or concerns, please call the clinic at the number listed above.       Sincerely,        NOA FLOREZ MD

## 2020-06-08 NOTE — LETTER
Monserrat 10, 2020      Mergitu Nevin  [address]  [facility] [address]        Dear ,    We are writing to inform you of your test results.  Your quad screen was normal.    If you have any questions or concerns, please call the clinic at [phone].       Sincerely,        NOA FLOREZ MD

## 2020-06-09 ENCOUNTER — TELEPHONE (OUTPATIENT)
Dept: OBGYN | Facility: CLINIC | Age: 35
End: 2020-06-09

## 2020-06-09 DIAGNOSIS — L30.9 ECZEMA, UNSPECIFIED TYPE: Primary | ICD-10-CM

## 2020-06-09 LAB
BACTERIA SPEC CULT: NORMAL
DEPRECATED CALCIDIOL+CALCIFEROL SERPL-MC: 14 UG/L (ref 20–75)
HBV SURFACE AG SERPL QL IA: NONREACTIVE
HIV 1+2 AB+HIV1 P24 AG SERPL QL IA: NONREACTIVE
RUBV IGG SERPL IA-ACNC: 9 IU/ML
SPECIMEN SOURCE: NORMAL
T PALLIDUM AB SER QL: NONREACTIVE

## 2020-06-09 RX ORDER — CLOBETASOL PROPIONATE 0.5 MG/G
CREAM TOPICAL 2 TIMES DAILY
Qty: 60 G | Refills: 1 | Status: SHIPPED | OUTPATIENT
Start: 2020-06-09 | End: 2020-10-07

## 2020-06-09 NOTE — TELEPHONE ENCOUNTER
Dr. Toledo saw patient yesterday, ordered Westcort for eczema on finger. This is not covered by insurance. Pharmacist suggested Clobetasol. (would not switch med without new order). Okay for new Rx? Lina Souza RN

## 2020-06-10 LAB
# FETUSES US: NORMAL
# FETUSES: NORMAL
AFP ADJ MOM AMN: 1.63
AFP SERPL-MCNC: 58 NG/ML
AGE - REPORTED: 34 YR
COPATH REPORT: NORMAL
CURRENT SMOKER: NO
CURRENT SMOKER: NO
DIABETES STATUS PATIENT: NO
FAMILY MEMBER DISEASES HX: NO
FAMILY MEMBER DISEASES HX: NO
GA METHOD: NORMAL
GA METHOD: NORMAL
GA: NORMAL WK
HCG MOM SERPL: 0.55
HCG SERPL-ACNC: NORMAL IU/L
HX OF HEREDITARY DISORDERS: NO
IDDM PATIENT QL: NO
INHIBIN A MOM SERPL: 1.19
INHIBIN A SERPL-MCNC: 173 PG/ML
INTEGRATED SCN PATIENT-IMP: NORMAL
IVF PREGNANCY: NO
LMP START DATE: NORMAL
MONOCHORIONIC TWINS: NO
PAP: NORMAL
PATHOLOGY STUDY: NORMAL
PREV FETUS DEFECT: NO
SERVICE CMNT-IMP: NO
SPECIMEN DRAWN SERPL: NORMAL
U ESTRIOL MOM SERPL: 0.53
U ESTRIOL SERPL-MCNC: 0.58 NG/ML
VALPROIC/CARBAMAZEPINE STATUS: NO
WEIGHT UNITS: NORMAL

## 2020-06-16 LAB
FINAL DIAGNOSIS: NORMAL
HPV HR 12 DNA CVX QL NAA+PROBE: NEGATIVE
HPV16 DNA SPEC QL NAA+PROBE: NEGATIVE
HPV18 DNA SPEC QL NAA+PROBE: NEGATIVE
SPECIMEN DESCRIPTION: NORMAL
SPECIMEN SOURCE CVX/VAG CYTO: NORMAL

## 2020-07-01 ENCOUNTER — PRENATAL OFFICE VISIT (OUTPATIENT)
Dept: MIDWIFE SERVICES | Facility: CLINIC | Age: 35
End: 2020-07-01
Payer: COMMERCIAL

## 2020-07-01 ENCOUNTER — ANCILLARY PROCEDURE (OUTPATIENT)
Dept: ULTRASOUND IMAGING | Facility: CLINIC | Age: 35
End: 2020-07-01
Attending: OBSTETRICS & GYNECOLOGY
Payer: COMMERCIAL

## 2020-07-01 VITALS
DIASTOLIC BLOOD PRESSURE: 69 MMHG | HEART RATE: 65 BPM | SYSTOLIC BLOOD PRESSURE: 104 MMHG | WEIGHT: 171 LBS | BODY MASS INDEX: 27.6 KG/M2

## 2020-07-01 DIAGNOSIS — Z34.80 ENCOUNTER FOR SUPERVISION OF OTHER NORMAL PREGNANCY, UNSPECIFIED TRIMESTER: Primary | ICD-10-CM

## 2020-07-01 DIAGNOSIS — Z34.80 SUPERVISION OF OTHER NORMAL PREGNANCY, ANTEPARTUM: ICD-10-CM

## 2020-07-01 PROCEDURE — 76805 OB US >/= 14 WKS SNGL FETUS: CPT | Performed by: OBSTETRICS & GYNECOLOGY

## 2020-07-01 PROCEDURE — 99207 ZZC PRENATAL VISIT: CPT | Performed by: ADVANCED PRACTICE MIDWIFE

## 2020-07-01 NOTE — PROGRESS NOTES
Pt here in clinic after fetal survey, MD patient but unavailable so saw CNM for this appointment. Fetal survey unable to visualize left kidney (possibly absent) and left EIF. Order for MFM US. Pt still having derm issue on fingers. Anti-fungal not working. Will try a steroid cream and if no relief will make appointment with FP to address. Discussed GCT/hgb at 24 weeks. Will make next appointment in 4-5 weeks. MML

## 2020-07-02 ENCOUNTER — TRANSCRIBE ORDERS (OUTPATIENT)
Dept: MATERNAL FETAL MEDICINE | Facility: CLINIC | Age: 35
End: 2020-07-02

## 2020-07-02 DIAGNOSIS — O26.90 PREGNANCY RELATED CONDITION, ANTEPARTUM: Primary | ICD-10-CM

## 2020-07-08 ENCOUNTER — APPOINTMENT (OUTPATIENT)
Dept: INTERPRETER SERVICES | Facility: CLINIC | Age: 35
End: 2020-07-08
Payer: COMMERCIAL

## 2020-07-10 ENCOUNTER — PRE VISIT (OUTPATIENT)
Dept: MATERNAL FETAL MEDICINE | Facility: CLINIC | Age: 35
End: 2020-07-10

## 2020-07-13 ENCOUNTER — APPOINTMENT (OUTPATIENT)
Dept: INTERPRETER SERVICES | Facility: CLINIC | Age: 35
End: 2020-07-13
Payer: COMMERCIAL

## 2020-07-14 ENCOUNTER — OFFICE VISIT (OUTPATIENT)
Dept: MATERNAL FETAL MEDICINE | Facility: CLINIC | Age: 35
End: 2020-07-14
Attending: ADVANCED PRACTICE MIDWIFE
Payer: COMMERCIAL

## 2020-07-14 ENCOUNTER — HOSPITAL ENCOUNTER (OUTPATIENT)
Dept: ULTRASOUND IMAGING | Facility: CLINIC | Age: 35
End: 2020-07-14
Attending: ADVANCED PRACTICE MIDWIFE
Payer: COMMERCIAL

## 2020-07-14 DIAGNOSIS — O26.90 PREGNANCY RELATED CONDITION, ANTEPARTUM: ICD-10-CM

## 2020-07-14 DIAGNOSIS — Z03.73 ENCOUNTER FOR SUSPECTED FETAL ANOMALY RULED OUT: Primary | ICD-10-CM

## 2020-07-14 PROCEDURE — 76811 OB US DETAILED SNGL FETUS: CPT

## 2020-07-14 NOTE — PROGRESS NOTES
Please refer to ultrasound report under 'Imaging' Studies of 'Chart Review' tabs.    Akin Torres M.D.

## 2020-07-29 ENCOUNTER — PRENATAL OFFICE VISIT (OUTPATIENT)
Dept: OBGYN | Facility: CLINIC | Age: 35
End: 2020-07-29
Payer: COMMERCIAL

## 2020-07-29 VITALS
SYSTOLIC BLOOD PRESSURE: 102 MMHG | WEIGHT: 176 LBS | DIASTOLIC BLOOD PRESSURE: 61 MMHG | HEART RATE: 82 BPM | TEMPERATURE: 98.1 F | BODY MASS INDEX: 28.41 KG/M2

## 2020-07-29 DIAGNOSIS — Z34.82 ENCOUNTER FOR SUPERVISION OF OTHER NORMAL PREGNANCY IN SECOND TRIMESTER: Primary | ICD-10-CM

## 2020-07-29 DIAGNOSIS — B35.6 TINEA CRURIS: ICD-10-CM

## 2020-07-29 PROCEDURE — 99207 ZZC PRENATAL VISIT: CPT | Performed by: OBSTETRICS & GYNECOLOGY

## 2020-07-29 RX ORDER — NYSTATIN 100000 U/G
CREAM TOPICAL 2 TIMES DAILY
Qty: 30 G | Refills: 0 | Status: ON HOLD | OUTPATIENT
Start: 2020-07-29 | End: 2020-11-12

## 2020-07-31 NOTE — PROGRESS NOTES
23w6d  Doing well since last visit.  No contractions, vaginal bleeding, or leaking fluid.  Normal fetal movement.  Main concern today is a rash she has had on her fingers for the last few months.  Based on chart review, it appears she was given 2 different prescriptions for different steroid creams, with no improvement.  She reports she thinks this is related to the start of ringworm in her child.  On her bilateral hands, most notably on her left hand between her digits, she has dry peeling skin.  There are some slightly raised irregular areas.  This does appear consistent with a fungal infection.  Prescribed her nystatin cream, but discussed that she should follow-up with family practice if this does not improve.  RTC 3w for prenatal visit and GCT, Tdap.  Nette Bui MD

## 2020-08-02 ENCOUNTER — NURSE TRIAGE (OUTPATIENT)
Dept: NURSING | Facility: CLINIC | Age: 35
End: 2020-08-02

## 2020-08-23 NOTE — PROGRESS NOTES
27w4d  Prescribed anti-fungal cream at last visit.  Reports it got better initially, but then came back and seems to be getting worse.  Recommended eval with FP, which she will schedule at the  before leaving.  Otherwise doing well.  No concerns or complaints.  Discussed delivery planning; desires repeat c/s and BTL.  FTP signed today and case request placed.  S>D, but fairly stable.  Will recheck at next office visit and order growth US, if appropriate.  GCT today.  Tdap next visit.  Plan phone visit in 2.5 weeks, in person visit in 4.5 weeks.  Nette Bui MD

## 2020-08-24 ENCOUNTER — PRENATAL OFFICE VISIT (OUTPATIENT)
Dept: OBGYN | Facility: CLINIC | Age: 35
End: 2020-08-24
Payer: COMMERCIAL

## 2020-08-24 VITALS
HEART RATE: 90 BPM | BODY MASS INDEX: 29.21 KG/M2 | DIASTOLIC BLOOD PRESSURE: 69 MMHG | TEMPERATURE: 98.4 F | SYSTOLIC BLOOD PRESSURE: 107 MMHG | WEIGHT: 181 LBS

## 2020-08-24 DIAGNOSIS — Z98.891 HISTORY OF CESAREAN DELIVERY: ICD-10-CM

## 2020-08-24 DIAGNOSIS — R21 RASH: ICD-10-CM

## 2020-08-24 DIAGNOSIS — Z34.80 SUPERVISION OF OTHER NORMAL PREGNANCY, ANTEPARTUM: Primary | ICD-10-CM

## 2020-08-24 DIAGNOSIS — O34.219 PREVIOUS CESAREAN DELIVERY AFFECTING PREGNANCY: ICD-10-CM

## 2020-08-24 LAB
GLUCOSE 1H P 50 G GLC PO SERPL-MCNC: 108 MG/DL (ref 60–129)
HGB BLD-MCNC: 11.1 G/DL (ref 11.7–15.7)

## 2020-08-24 PROCEDURE — 82950 GLUCOSE TEST: CPT | Performed by: OBSTETRICS & GYNECOLOGY

## 2020-08-24 PROCEDURE — 99207 ZZC PRENATAL VISIT: CPT | Performed by: OBSTETRICS & GYNECOLOGY

## 2020-08-24 PROCEDURE — 00000218 ZZHCL STATISTIC OBHBG - HEMOGLOBIN: Performed by: OBSTETRICS & GYNECOLOGY

## 2020-08-24 PROCEDURE — 36415 COLL VENOUS BLD VENIPUNCTURE: CPT | Performed by: OBSTETRICS & GYNECOLOGY

## 2020-08-24 NOTE — LETTER
August 24, 2020      Trina Rooney  [address] [facility] [address]        Dear ,    We are writing to inform you of your test results.    Your test results fall within the expected range(s) or remain unchanged from previous results.  Please continue with current treatment plan.    Resulted Orders   Glucose tolerance gest screen 1 hour   Result Value Ref Range    Glu Gest Screen 1hr 50g 108 60 - 129 mg/dL   OB hemoglobin   Result Value Ref Range    Hemoglobin 11.1 (L) 11.7 - 15.7 g/dL       If you have any questions or concerns, please call the clinic at the number listed above.       Sincerely,        Nette Bui MD

## 2020-08-25 NOTE — PROGRESS NOTES
Subjective     Trina Rooney is a 34 year old female who presents to clinic today for the following health issues:    HPI       Rash  Onset/Duration: 2 months ago  Description  Location: Both hands.   Character: blotchy  Itching: mild  Intensity:  mild  Progression of Symptoms:  constant  Accompanying signs and symptoms:   Fever: no  Body aches or joint pain: no  Sore throat symptoms: no  Recent cold symptoms: no  History:           Previous episodes of similar rash: None  New exposures:  None  Recent travel: no  Exposure to similar rash: Pt states her son had a smiliar rash.   Precipitating or alleviating factors: No.   Therapies tried and outcome: Tried triamcinolone and nystatin and didn't help. Also did another, thinks maybe antifungal for about a week and this rash keeps coming back  Son is also very itchy similar linear rash in between his fingers  No chemical exposures does wear gloves at work and things      Review of Systems   CONSTITUTIONAL: NEGATIVE for fever, chills, change in weight  INTEGUMENTARY/SKIN: hpi  ENT/MOUTH: NEGATIVE for ear, mouth and throat problems  RESP: NEGATIVE for significant cough or SOB  CV: NEGATIVE for chest pain, palpitations or peripheral edema  MUSCULOSKELETAL: NEGATIVE for significant arthralgias or myalgia  NEURO: NEGATIVE for weakness, dizziness or paresthesias  ENDOCRINE: NEGATIVE for DM      Objective    LMP 02/13/2020   There is no height or weight on file to calculate BMI.  Physical Exam   GENERAL: healthy, alert and no distress  RESP: Respiratory rate regular and breathing easily   CV: No abnormal color and extremities warm   MS: no gross musculoskeletal defects noted, no edema  SKIN: right web of thumb with linear appearing rash, also in 2-3rd digit webspaces, left hand couple webs affected and left wrist old scab, skin also peeling   NEURO: Normal strength and tone, mentation intact and speech normal            Assessment & Plan     ICD-10-CM    1. Scabies  B86  "permethrin (ELIMITE) 5 % external cream     DISCONTINUED: permethrin (ELIMITE) 5 % external cream    vs fungal, has tried fungal creams perhaps treatment failure due to only 1 week use, see patient instructions    BMI:   Estimated body mass index is 29.23 kg/m  as calculated from the following:    Height as of this encounter: 1.676 m (5' 5.98\").    Weight as of this encounter: 82.1 kg (181 lb).   Weight management plan: Patient was referred to their PCP to discuss a diet and exercise plan.            No follow-ups on file.    HIRA Liu [facility]      "

## 2020-08-26 ENCOUNTER — TELEPHONE (OUTPATIENT)
Dept: OBGYN | Facility: CLINIC | Age: 35
End: 2020-08-26

## 2020-08-26 ENCOUNTER — APPOINTMENT (OUTPATIENT)
Dept: INTERPRETER SERVICES | Facility: CLINIC | Age: 35
End: 2020-08-26
Payer: COMMERCIAL

## 2020-08-26 ENCOUNTER — OFFICE VISIT (OUTPATIENT)
Dept: FAMILY MEDICINE | Facility: CLINIC | Age: 35
End: 2020-08-26
Payer: COMMERCIAL

## 2020-08-26 VITALS
BODY MASS INDEX: 29.09 KG/M2 | WEIGHT: 181 LBS | HEART RATE: 89 BPM | DIASTOLIC BLOOD PRESSURE: 62 MMHG | RESPIRATION RATE: 16 BRPM | HEIGHT: 66 IN | TEMPERATURE: 98 F | OXYGEN SATURATION: 99 % | SYSTOLIC BLOOD PRESSURE: 114 MMHG

## 2020-08-26 DIAGNOSIS — B86 SCABIES: Primary | ICD-10-CM

## 2020-08-26 PROCEDURE — 99203 OFFICE O/P NEW LOW 30 MIN: CPT | Performed by: NURSE PRACTITIONER

## 2020-08-26 RX ORDER — PERMETHRIN 50 MG/G
CREAM TOPICAL
Qty: 60 G | Refills: 1 | Status: ON HOLD | OUTPATIENT
Start: 2020-08-26 | End: 2020-11-12

## 2020-08-26 RX ORDER — PERMETHRIN 50 MG/G
CREAM TOPICAL
Qty: 60 G | Refills: 1 | Status: SHIPPED | OUTPATIENT
Start: 2020-08-26 | End: 2020-08-26

## 2020-08-26 ASSESSMENT — MIFFLIN-ST. JEOR: SCORE: 1542.51

## 2020-08-26 ASSESSMENT — PAIN SCALES - GENERAL: PAINLEVEL: NO PAIN (0)

## 2020-08-26 NOTE — PATIENT INSTRUCTIONS
Patient Education     Scabies  Scabies is a skin infection. It is caused by a tiny parasitic insect, or mite, that is too small to see directly. It can be seen under a microscope, but it is usually recognized only by the rash and symptoms it causes. This can make it hard to diagnose since the signs and symptoms can be similar to other diseases.  The scabies mite tunnels under the skin. It creates a small burrow, where it leaves its eggs. These eggs franklin and grow into adults. They then create new burrows over the next 1 to 2 weeks. The mites die in about 4 to 6 weeks. The rash and itching are caused by an allergic reaction to the scabies saliva or feces.  Scabies is highly contagious. It is spread by direct skin contact. It is easily spread by close personal contact, sexual contact, or by sharing bed linens or clothing used by an infected person.  It may take 4 to 6 weeks for symptoms to appear after being exposed. Everyone living in the house with you, as well as your sexual partners, should be treated at the same time. After the first treatment, you will no longer be contagious. You may return to work, school or .  Home care    Machine wash in hot water all sheets, towels, pillowcases, underwear, pajamas, and any other clothing you have worn lately. Use the hot cycle of a dryer or use a hot iron to sterilize.    Seal anything that is hard to wash in a plastic trash bag for 4 days. This includes coats, jackets, blankets, and bedspreads. (The insects die after 3 days off the human body.)  Medicines  Scabicides  Medicines used to treat scabies are called scabicides. These are creams that kill the scabies mites. A prescription is needed. When using these medicines:    Always follow instructions provided by your healthcare provider and pharmacist. Also follow the printed instructions that come with the medicine.    Talk with your provider about precautions to take when using these medicines.    Use the cream  on your body when your skin is cool and dry. Don t use it after a hot shower or bath.    Usually the cream is put on your whole body. This means from your chin all the way down to your toes. Scabies does not usually affect an adult s head. So cream is not needed there. For children, discuss this with your child s provider.    Leave the cream or lotion on for the recommended amount of time. This is usually 8 to 12 hours.    Don t leave cream or lotion on your skin longer than directed. Don t use more than recommended.    Clean clothes should be worn after the treatment.    If you wash your hands after using the cream, you will need to reapply the cream to your hands.    If you are breastfeeding, wash off your nipples before feeding. Then reapply the cream after breastfeeding.    For babies or infants, put mittens on their hands. This will stop them from licking the cream or lotion. It will also stop them from scratching themselves because of the itching.  Other medicines    An oral medicine called ivermectin may be prescribed for severe cases. It may also be used if you can t apply creams.    Itching may cause the most discomfort. If large areas of your skin are affected, over-the-counter antihistamines may be used to reduce itching. Or you may be given a prescription antihistamine. Some of these medicines may make you sleepy. They are best used at bedtime. Antihistamines that don t make you sleepy can be used during the day. Note: Don t use medicine that has diphenhydramine if you have glaucoma, or if you are a man who has trouble urinating due to an enlarged prostate.    If you were given antibiotics due to a bacterial infection, take them until they are finished. It is important to finish the antibiotics even if the wound looks better. This is to make sure the infection has cleared.  Follow-up care  Follow up with your healthcare provider, or as advised. Call your provider if your symptoms don t improve after 1  week, or if new burrows or rashes appear.  When to seek medical advice  Call your healthcare provider right away if any of these occur:    Yellow-brown crusts or drainage from the sores    Other signs of infection, including increasing redness, swelling, pain, or pus    Fever of 100.4 F (38 C) or higher, or as directed by your provider  Date Last Reviewed: 8/1/2016 2000-2019 [email or web address]. [address] [place] [address], [place] [address]. All rights reserved. This information is not intended as a substitute for professional medical care. Always follow your healthcare professional's instructions.         If it does not clear up with this, the next step would be the clotrimazole twice a day for 2-3 weeks or until gone

## 2020-09-09 ENCOUNTER — PRENATAL OFFICE VISIT (OUTPATIENT)
Dept: OBGYN | Facility: CLINIC | Age: 35
End: 2020-09-09
Payer: COMMERCIAL

## 2020-09-09 DIAGNOSIS — R21 RASH OF HANDS: ICD-10-CM

## 2020-09-09 DIAGNOSIS — Z34.83 ENCOUNTER FOR SUPERVISION OF OTHER NORMAL PREGNANCY IN THIRD TRIMESTER: Primary | ICD-10-CM

## 2020-09-09 DIAGNOSIS — K21.9 GASTROESOPHAGEAL REFLUX DISEASE, ESOPHAGITIS PRESENCE NOT SPECIFIED: ICD-10-CM

## 2020-09-09 PROCEDURE — 99207 ZZC PRENATAL VISIT: CPT | Performed by: OBSTETRICS & GYNECOLOGY

## 2020-09-09 RX ORDER — CALCIUM CARBONATE 500 MG/1
1 TABLET, CHEWABLE ORAL 3 TIMES DAILY PRN
Qty: 100 TABLET | Refills: 1 | Status: ON HOLD | OUTPATIENT
Start: 2020-09-09 | End: 2020-11-12

## 2020-09-09 NOTE — PROGRESS NOTES
Phone visit for modified prenatal care due to COVID-19 pandemic.  29w6d  Doing well since last visit.  No contractions, vaginal bleeding or leakage of fluid.  Baby active.  Having some heart burn.  Will try Tums first.  Saw FP recently for rash on hands and prescribed permethrin for presumed scabies.  Patient reports things aren't any better and, in fact, are actually worse.  New treatment is not helping.  Recommended she call and schedule another appointment with FP.  Plan:  In person visit in 2w.  Nette Bui MD    Duration of call:  7 minutes

## 2020-09-10 DIAGNOSIS — Z11.59 ENCOUNTER FOR SCREENING FOR OTHER VIRAL DISEASES: Primary | ICD-10-CM

## 2020-09-10 PROBLEM — Z98.891 HISTORY OF CESAREAN DELIVERY: Status: ACTIVE | Noted: 2020-09-10

## 2020-09-10 NOTE — TELEPHONE ENCOUNTER
Type of surgery: ob  Location of surgery: [facility]  Date and time of surgery: 11/12/20 1030a  Surgeon: Ramya  Pre-Op Appt Date: surgeon to do  Post-Op Appt Date: 6 weeks   Packet sent out: Yes  Pre-cert/Authorization completed:  No  Date: 09/10/20  Fiordaliza Bull, Surgery Coordinator

## 2020-09-10 NOTE — TELEPHONE ENCOUNTER
With an ASHLEY of 11/19, she'll be 39w on 11/12.  Looks like there are no c/s scheduled that day, so that would be ideal.  Also works out that I'm on call that day as well.    Nette Bui MD

## 2020-09-22 NOTE — PROGRESS NOTES
31w6d  Doing fairly well since last visit.  No contractions, vaginal bleeding or leakage of fluid.  Baby active.  Rash on hands is not getting better.  Has more peeling, mostly on the left hand.  Concerned it could be contagious and she could spread it to baby after delivery.  No itching on feet.  Cholestasis unlikely, but labs ordered to rule it out (will do hepatic panel today, but come back for fasting bile acids.  Patient also has appointment with derm tomorrow  FH still measuring a bit ahead.  Will check growth US with PNV in 2w.  Nette Bui MD

## 2020-09-23 ENCOUNTER — PRENATAL OFFICE VISIT (OUTPATIENT)
Dept: OBGYN | Facility: CLINIC | Age: 35
End: 2020-09-23
Payer: COMMERCIAL

## 2020-09-23 VITALS
HEART RATE: 91 BPM | WEIGHT: 184 LBS | BODY MASS INDEX: 29.71 KG/M2 | SYSTOLIC BLOOD PRESSURE: 108 MMHG | DIASTOLIC BLOOD PRESSURE: 72 MMHG

## 2020-09-23 DIAGNOSIS — L29.9 ITCHING: ICD-10-CM

## 2020-09-23 DIAGNOSIS — Z34.83 ENCOUNTER FOR SUPERVISION OF OTHER NORMAL PREGNANCY IN THIRD TRIMESTER: Primary | ICD-10-CM

## 2020-09-23 PROCEDURE — 36415 COLL VENOUS BLD VENIPUNCTURE: CPT | Performed by: OBSTETRICS & GYNECOLOGY

## 2020-09-23 PROCEDURE — 80076 HEPATIC FUNCTION PANEL: CPT | Performed by: OBSTETRICS & GYNECOLOGY

## 2020-09-23 PROCEDURE — 99207 ZZC PRENATAL VISIT: CPT | Performed by: OBSTETRICS & GYNECOLOGY

## 2020-09-24 ENCOUNTER — OFFICE VISIT (OUTPATIENT)
Dept: DERMATOLOGY | Facility: CLINIC | Age: 35
End: 2020-09-24
Payer: COMMERCIAL

## 2020-09-24 DIAGNOSIS — R21 RASH: Primary | ICD-10-CM

## 2020-09-24 ASSESSMENT — PAIN SCALES - GENERAL
PAINLEVEL: NO PAIN (0)
PAINLEVEL: NO PAIN (0)

## 2020-09-24 NOTE — NURSING NOTE
Xylocaine 1 % injection   0.5mL once for one use, starting 9/24/2020 ending 9/24/2020,  2mL disp, R-0, injection  Injected by YASMIN Baker

## 2020-09-24 NOTE — PATIENT INSTRUCTIONS

## 2020-09-24 NOTE — NURSING NOTE
Dermatology Rooming Note    Trina Rooney's goals for this visit include:   Chief Complaint   Patient presents with     Derm Problem     Dry, itchy hands. Trina notes that she has used many topical medications.     Camille Null, CMA

## 2020-09-24 NOTE — LETTER
2020       RE: Trina Rooney  [address]  [place] [address]     Dear Colleague,    Thank you for referring your patient, Trina Rooney, to the [facility] DERMATOLOGY at [facility]. Please see a copy of my visit note below.    [facility] Dermatology Note    Dermatology Problem List:  # Pertinent PMH: currently 32 weeks pregnant.  1. Pruritic rash on hands, favor inflammatory.  - Failed clobetasol, TMC, nystatin, permethrin  - Punch bx 2020, no scale for KOH     Encounter Date: Sep 24, 2020    CC:  Chief Complaint   Patient presents with     Derm Problem     Dry, itchy hands. Trina notes that she has used many topical medications.     History of Present Illness:  Ms. Trina Rooney is a 34 year old female who presents for evaluation of her itchy rash on her hands.     Ms. Rooney states that she first experienced the rash 3 months previously. It is very itchy. It will wake her up at night. She has never had anything like this before, no eczema in youth. Her children also have a rash and she worries she is spreading it to them. Her daughter has a history of eczema. No rash anywhere else on her skin, feet are okay.    She has seen her OB multiple times over the past two months for her symptoms, and states that topical steroids (clobetasol and triamcinolone), nystatin cream, and permethrin cream have been ineffective for her symptoms. At yesterday's prenatal visit, labs were ordered to rule out cholestasis of pregnancy, and are pending. Records reviewed.    On further questioning, she used the clobetasol cream BID for about 10-14 days and it did help the itch but when she stopped it, it came back.    She is 32 weeks pregnant.    Otherwise feeling well, no additional skin concerns.       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 pylori infection 2013     History of positive PPD      Latent tuberculosis      Placenta abruptio      Past Surgical History:   Procedure Laterality Date     C/SECTION, LOW TRANSVERSE N/A 2014    , Low Transverse      SECTION N/A 2019    Procedure:  SECTION;  Surgeon: Adalgisa Gaona MD;  Location: [facility]     Social History:  Patient reports that she has never smoked. She has never used smokeless tobacco. She reports that she does not drink alcohol or use drugs.    Family History:  Family History   Problem Relation Age of Onset     Family History Negative Other      Medications:  Current Outpatient Medications   Medication Sig Dispense Refill     calcium carbonate (TUMS) 500 MG chewable tablet Take 1 tablet (500 mg) by mouth 3 times daily as needed for heartburn 100 tablet 1     Cholecalciferol (VITAMIN D) 2000 UNITS tablet Take 2,000 Units by mouth       permethrin (ELIMITE) 5 % external cream Apply cream from head to toe (except the face); leave on for 8-14 hours then wash off with water; reapply in 1 week if live mites appear. 60 g 1     Prenatal Vit-Fe Fumarate-FA (PRENATAL MULTIVITAMIN W/IRON) 27-0.8 MG tablet Take 1 tablet by mouth daily 90 tablet 3     vitamin D3 (CHOLECALCIFEROL) 2000 units (50 mcg) tablet Take 1 tablet (2,000 Units) by mouth daily 90 tablet 3     clobetasol (TEMOVATE) 0.05 % external cream Apply topically 2 times daily (Patient not taking: Reported on 2020) 60 g 1     nystatin (MYCOSTATIN) 067654 UNIT/GM external cream Apply topically 2 times daily (Patient not taking: Reported on 2020) 30 g 0     triamcinolone (KENALOG) 0.5 % external ointment Apply 1 g topically 2 times daily (Patient not taking: Reported on 2020) 1 Tube  0     No Known Allergies      Review of Systems:  -Constitutional: Otherwise feeling well today, in usual state of health.  -HEENT: Patient denies nonhealing oral sores.  -Skin: As above in HPI. No additional skin concerns.    Physical exam:  Vitals: LMP 02/13/2020   GEN: This is a well developed, well-nourished female in no acute distress, in a pleasant mood.    SKIN: Focused examination of the face and hands was performed.  -Vegas skin type: V  -wearing mask.  -slightly thick hyperkeratosis of palms.  -right thenar eminence with hyperpigmented to deeply erythematous thin patch with thick scale.  -mild xerosis between some fingers.  -No other lesions of concern on areas examined.     Impression/Plan:  1. Bilateral hand in setting of pregnancy (started in her 2nd trimester). She has failed clobetasol cream, TMC cream, nystatin, and permethrin however it does sound like the clobetasol led to some temporary improvement but it just recurred. Tried to scrape for KOH today however only thick scale was present and essentially unable to scrape. Overall favor inflammatory (eczematous v psoriasiform), less likely fungal. Advised that as clobetasol previously helped, okay to resume and to use good moisturizer as well.  - Punch biopsy:  After discussion of benefits and risks including but not limited to bleeding/bruising, pain/swelling, infection, scar, incomplete removal, nerve damage/numbness, recurrence, and non-diagnostic biopsy, written consent, verbal consent and photographs were obtained. Time-out was performed. The area was cleaned with isopropyl alcohol. 0.5mL of 1% lidocaine (no epi) was injected to obtain adequate anesthesia of the lesion on the right thumb.  A 4 mm punch biopsy was performed. 4-0 prolene sutures were utilized to approximate the epidermal edges. White petroleum jelly/Vaseline and a bandage was applied to the wound. Explicit verbal and written wound care instructions were provided. The patient  left the Dermatology Clinic in good condition. The patient was counseled to follow up for suture removal in approximately 14 days.  - Can use clobetasol BID prn pending results.  - Advised daily moisturization with bland emollient.     Follow-up prn pending bx results.    Staff involved:  Staff only    Priya Morgan MD    Department of Dermatology  [facility] [place]: Phone: [phone], Fax: [phone]  9/25/2020

## 2020-09-24 NOTE — PROGRESS NOTES
[facility] Dermatology Note    Dermatology Problem List:  # Pertinent PMH: currently 32 weeks pregnant.  1. Pruritic rash on hands, favor inflammatory.  - Failed clobetasol, TMC, nystatin, permethrin  - Punch bx 2020, no scale for KOH     Encounter Date: Sep 24, 2020    CC:  Chief Complaint   Patient presents with     Derm Problem     Dry, itchy hands. Trina notes that she has used many topical medications.     History of Present Illness:  Ms. Trina Rooney is a 34 year old female who presents for evaluation of her itchy rash on her hands.     Ms. Rooney states that she first experienced the rash 3 months previously. It is very itchy. It will wake her up at night. She has never had anything like this before, no eczema in youth. Her children also have a rash and she worries she is spreading it to them. Her daughter has a history of eczema. No rash anywhere else on her skin, feet are okay.    She has seen her OB multiple times over the past two months for her symptoms, and states that topical steroids (clobetasol and triamcinolone), nystatin cream, and permethrin cream have been ineffective for her symptoms. At yesterday's prenatal visit, labs were ordered to rule out cholestasis of pregnancy, and are pending. Records reviewed.    On further questioning, she used the clobetasol cream BID for about 10-14 days and it did help the itch but when she stopped it, it came back.    She is 32 weeks pregnant.    Otherwise feeling well, no additional skin concerns.       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 pylori infection 2013     History of positive PPD      Latent tuberculosis       Placenta abruptio      Past Surgical History:   Procedure Laterality Date     C/SECTION, LOW TRANSVERSE N/A 2014    , Low Transverse      SECTION N/A 2019    Procedure:  SECTION;  Surgeon: Adalgisa Gaona MD;  Location:  L+D     Social History:  Patient reports that she has never smoked. She has never used smokeless tobacco. She reports that she does not drink alcohol or use drugs.    Family History:  Family History   Problem Relation Age of Onset     Family History Negative Other      Medications:  Current Outpatient Medications   Medication Sig Dispense Refill     calcium carbonate (TUMS) 500 MG chewable tablet Take 1 tablet (500 mg) by mouth 3 times daily as needed for heartburn 100 tablet 1     Cholecalciferol (VITAMIN D) 2000 UNITS tablet Take 2,000 Units by mouth       permethrin (ELIMITE) 5 % external cream Apply cream from head to toe (except the face); leave on for 8-14 hours then wash off with water; reapply in 1 week if live mites appear. 60 g 1     Prenatal Vit-Fe Fumarate-FA (PRENATAL MULTIVITAMIN W/IRON) 27-0.8 MG tablet Take 1 tablet by mouth daily 90 tablet 3     vitamin D3 (CHOLECALCIFEROL) 2000 units (50 mcg) tablet Take 1 tablet (2,000 Units) by mouth daily 90 tablet 3     clobetasol (TEMOVATE) 0.05 % external cream Apply topically 2 times daily (Patient not taking: Reported on 2020) 60 g 1     nystatin (MYCOSTATIN) 439282 UNIT/GM external cream Apply topically 2 times daily (Patient not taking: Reported on 2020) 30 g 0     triamcinolone (KENALOG) 0.5 % external ointment Apply 1 g topically 2 times daily (Patient not taking: Reported on 2020) 1 Tube 0     No Known Allergies      Review of Systems:  -Constitutional: Otherwise feeling well today, in usual state of health.  -HEENT: Patient denies nonhealing oral sores.  -Skin: As above in HPI. No additional skin concerns.    Physical exam:  Vitals: LMP 2020   GEN: This is a  well developed, well-nourished female in no acute distress, in a pleasant mood.    SKIN: Focused examination of the face and hands was performed.  -Vegas skin type: V  -wearing mask.  -slightly thick hyperkeratosis of palms.  -right thenar eminence with hyperpigmented to deeply erythematous thin patch with thick scale.  -mild xerosis between some fingers.  -No other lesions of concern on areas examined.     Impression/Plan:  1. Bilateral hand in setting of pregnancy (started in her 2nd trimester). She has failed clobetasol cream, TMC cream, nystatin, and permethrin however it does sound like the clobetasol led to some temporary improvement but it just recurred. Tried to scrape for KOH today however only thick scale was present and essentially unable to scrape. Overall favor inflammatory (eczematous v psoriasiform), less likely fungal. Advised that as clobetasol previously helped, okay to resume and to use good moisturizer as well.  - Punch biopsy:  After discussion of benefits and risks including but not limited to bleeding/bruising, pain/swelling, infection, scar, incomplete removal, nerve damage/numbness, recurrence, and non-diagnostic biopsy, written consent, verbal consent and photographs were obtained. Time-out was performed. The area was cleaned with isopropyl alcohol. 0.5mL of 1% lidocaine (no epi) was injected to obtain adequate anesthesia of the lesion on the right thumb.  A 4 mm punch biopsy was performed. 4-0 prolene sutures were utilized to approximate the epidermal edges. White petroleum jelly/Vaseline and a bandage was applied to the wound. Explicit verbal and written wound care instructions were provided. The patient left the Dermatology Clinic in good condition. The patient was counseled to follow up for suture removal in approximately 14 days.  - Can use clobetasol BID prn pending results.  - Advised daily moisturization with bland emollient.     Follow-up prn pending bx results.    Staff  involved:  Staff only    Priya Morgan MD    Department of Dermatology  [facility]: Phone: [phone], Fax: [phone]  9/25/2020

## 2020-09-25 LAB
ALBUMIN SERPL-MCNC: 2.6 G/DL (ref 3.4–5)
ALP SERPL-CCNC: 109 U/L (ref 40–150)
ALT SERPL W P-5'-P-CCNC: 19 U/L (ref 0–50)
AST SERPL W P-5'-P-CCNC: 16 U/L (ref 0–45)
BILIRUB DIRECT SERPL-MCNC: <0.1 MG/DL (ref 0–0.2)
BILIRUB SERPL-MCNC: 0.2 MG/DL (ref 0.2–1.3)
PROT SERPL-MCNC: 6.5 G/DL (ref 6.8–8.8)

## 2020-09-29 DIAGNOSIS — L29.9 ITCHING: ICD-10-CM

## 2020-09-29 PROCEDURE — 36415 COLL VENOUS BLD VENIPUNCTURE: CPT | Performed by: OBSTETRICS & GYNECOLOGY

## 2020-09-29 PROCEDURE — 82239 BILE ACIDS TOTAL: CPT | Mod: 90 | Performed by: OBSTETRICS & GYNECOLOGY

## 2020-09-29 PROCEDURE — 99000 SPECIMEN HANDLING OFFICE-LAB: CPT | Performed by: OBSTETRICS & GYNECOLOGY

## 2020-09-30 LAB — BILE AC SERPL-SCNC: 1 UMOL/L (ref 0–10)

## 2020-10-05 ENCOUNTER — PRENATAL OFFICE VISIT (OUTPATIENT)
Dept: OBGYN | Facility: CLINIC | Age: 35
End: 2020-10-05
Attending: OBSTETRICS & GYNECOLOGY
Payer: COMMERCIAL

## 2020-10-05 ENCOUNTER — ANCILLARY PROCEDURE (OUTPATIENT)
Dept: ULTRASOUND IMAGING | Facility: CLINIC | Age: 35
End: 2020-10-05
Attending: OBSTETRICS & GYNECOLOGY
Payer: COMMERCIAL

## 2020-10-05 VITALS
WEIGHT: 185.3 LBS | HEIGHT: 66 IN | DIASTOLIC BLOOD PRESSURE: 70 MMHG | TEMPERATURE: 98.9 F | BODY MASS INDEX: 29.78 KG/M2 | HEART RATE: 77 BPM | SYSTOLIC BLOOD PRESSURE: 115 MMHG

## 2020-10-05 DIAGNOSIS — Z34.83 ENCOUNTER FOR SUPERVISION OF OTHER NORMAL PREGNANCY IN THIRD TRIMESTER: ICD-10-CM

## 2020-10-05 DIAGNOSIS — Z34.83 ENCOUNTER FOR SUPERVISION OF OTHER NORMAL PREGNANCY IN THIRD TRIMESTER: Primary | ICD-10-CM

## 2020-10-05 DIAGNOSIS — Z23 NEED FOR TDAP VACCINATION: ICD-10-CM

## 2020-10-05 LAB — COPATH REPORT: NORMAL

## 2020-10-05 PROCEDURE — 99207 PR PRENATAL VISIT: CPT | Performed by: OBSTETRICS & GYNECOLOGY

## 2020-10-05 PROCEDURE — 90715 TDAP VACCINE 7 YRS/> IM: CPT

## 2020-10-05 PROCEDURE — 76816 OB US FOLLOW-UP PER FETUS: CPT

## 2020-10-05 PROCEDURE — 90471 IMMUNIZATION ADMIN: CPT

## 2020-10-05 ASSESSMENT — MIFFLIN-ST. JEOR: SCORE: 1562.02

## 2020-10-05 NOTE — PROGRESS NOTES
33w4d  Doing well since last visit.  No new concerns.  No contractions, vaginal bleeding, or leaking fluid.  Baby very active  Saw dermatology after last visit and had biopsy.  Still awaiting results.  She was counseled as to how she could remove her suture at home, but I offered to do it for her today.  Skin no skin cleansed with Betadine.  End of suture was elevated and cut.  Full suture removed from skin.  Band-Aid applied.  Prelim ultrasound result shows normal growth at 55.7%.  Cephalic.  Normal fluid.  Tdap today.  Reports she'll get flu shot next visit.  RTC 2w, then weekly until delivery.  Nette Bui MD

## 2020-10-05 NOTE — PROGRESS NOTES
Clinic Administered Medication Documentation      Injectable Medication Documentation    Patient was given tdap. Prior to medication administration, verified patients identity using patient s name and date of birth. Please see MAR and medication order for additional information. Patient instructed to remain in clinic for 15 minutes.      Was entire vial of medication used? Yes  Vial/Syringe: Syringe  Expiration Date:  7/03/2022  Was this medication supplied by the patient? No

## 2020-10-07 DIAGNOSIS — L30.9 ECZEMA, UNSPECIFIED TYPE: ICD-10-CM

## 2020-10-07 RX ORDER — CLOBETASOL PROPIONATE 0.5 MG/G
CREAM TOPICAL 2 TIMES DAILY
Qty: 60 G | Refills: 1 | Status: ON HOLD | OUTPATIENT
Start: 2020-10-07 | End: 2020-11-12

## 2020-10-07 NOTE — TELEPHONE ENCOUNTER
----- Message from Priya Morgan MD sent at 10/5/2020  4:15 PM CDT -----  Please let patient know her biopsy mostly showed an eczema type reaction.  It's unclear what triggered this, sometimes pregnancy can induce some eczema reactions so this is a possibility.  Most important will be putting on a good daily moisturizer such as Vaseline, Vanicream, Cetaphil, Cerave, Eucerin, etc. every day. It is best to apply moisturizers right after washing hands/bathing because it helps trap water in the skin.  When rash is present, it is safe to continue to the clobetasol cream twice daily as needed until rash improves. When rash improves, she can stop the clobetasol but should continue the moisturizer. She can also wear cotton gloves at nighttime after applying the medication to help the medicine absorb into the skin.  Importantly there was no signs of fungus or infection.  I'm not sure if this will continue to come/go or if it will go away after pregnancy.  If she wants to set up a time to discuss further, please help her do so otherwise we could plan to check in again in about 6-8 weeks on the phone to see how she's doing.  Please let me know if any questions or concerns.

## 2020-10-07 NOTE — TELEPHONE ENCOUNTER
I spoke to Trina Rooney and gave results. Patient understood and had no further questions or concerns. Appointment scheduled. Trina needs a new prescription for Clobetasol.

## 2020-10-27 ENCOUNTER — PRENATAL OFFICE VISIT (OUTPATIENT)
Dept: OBGYN | Facility: CLINIC | Age: 35
End: 2020-10-27
Payer: COMMERCIAL

## 2020-10-27 VITALS
TEMPERATURE: 98.1 F | DIASTOLIC BLOOD PRESSURE: 73 MMHG | WEIGHT: 186.4 LBS | HEART RATE: 94 BPM | BODY MASS INDEX: 30.1 KG/M2 | SYSTOLIC BLOOD PRESSURE: 104 MMHG

## 2020-10-27 DIAGNOSIS — Z34.83 ENCOUNTER FOR SUPERVISION OF OTHER NORMAL PREGNANCY IN THIRD TRIMESTER: Primary | ICD-10-CM

## 2020-10-27 LAB — HGB BLD-MCNC: 12.1 G/DL (ref 11.7–15.7)

## 2020-10-27 PROCEDURE — 99N1025 PR STATISTIC OBHBG - HEMOGLOBIN: Performed by: OBSTETRICS & GYNECOLOGY

## 2020-10-27 PROCEDURE — 99207 PR PRENATAL VISIT: CPT | Performed by: OBSTETRICS & GYNECOLOGY

## 2020-10-27 PROCEDURE — 36415 COLL VENOUS BLD VENIPUNCTURE: CPT | Performed by: OBSTETRICS & GYNECOLOGY

## 2020-10-27 PROCEDURE — 87653 STREP B DNA AMP PROBE: CPT | Performed by: OBSTETRICS & GYNECOLOGY

## 2020-10-27 NOTE — PROGRESS NOTES
36w5d  Doing well since last visit.  Feeling pelvic pressure, but no contractions, vaginal bleeding or leakage of fluid.  Baby active.  Confirmed desire for BTL  GBS, hgb and BSUS today.  Cephalic by BSUS  RTC weekly until delivery.  Nette Bui MD

## 2020-10-28 ENCOUNTER — TELEPHONE (OUTPATIENT)
Dept: OBGYN | Facility: CLINIC | Age: 35
End: 2020-10-28

## 2020-10-28 LAB
GP B STREP DNA SPEC QL NAA+PROBE: NEGATIVE
SPECIMEN SOURCE: NORMAL

## 2020-10-28 NOTE — TELEPHONE ENCOUNTER
Forms received and filled out, signed by provider. Copy sent to HIM. TC to patient to notify her she can  forms at [facility].   Fiordaliza Bull, Surgery Coordinator

## 2020-11-03 ENCOUNTER — PRENATAL OFFICE VISIT (OUTPATIENT)
Dept: OBGYN | Facility: CLINIC | Age: 35
End: 2020-11-03
Payer: COMMERCIAL

## 2020-11-03 VITALS
DIASTOLIC BLOOD PRESSURE: 70 MMHG | HEART RATE: 73 BPM | TEMPERATURE: 97.7 F | BODY MASS INDEX: 30.2 KG/M2 | SYSTOLIC BLOOD PRESSURE: 109 MMHG | WEIGHT: 187 LBS

## 2020-11-03 DIAGNOSIS — R21 RASH OF HANDS: ICD-10-CM

## 2020-11-03 DIAGNOSIS — Z34.83 ENCOUNTER FOR SUPERVISION OF OTHER NORMAL PREGNANCY IN THIRD TRIMESTER: Primary | ICD-10-CM

## 2020-11-03 PROCEDURE — 99207 PR PRENATAL VISIT: CPT | Performed by: OBSTETRICS & GYNECOLOGY

## 2020-11-03 NOTE — PROGRESS NOTES
37w5d  Doing well since last visit.  Feeling more pressure, but no significant contractions.  No vaginal bleeding or leakage of fluid.  Baby active.  Has another appointment with derm later this week.  Feels the rashes on her hands are not improving.  Concerned it could be contagious and she could spread to baby after delivery.  Labs and COVID test scheduled.  RTC weekly until delivery.  Nette Bui MD

## 2020-11-05 ENCOUNTER — VIRTUAL VISIT (OUTPATIENT)
Dept: DERMATOLOGY | Facility: CLINIC | Age: 35
End: 2020-11-05
Payer: COMMERCIAL

## 2020-11-05 DIAGNOSIS — Z34.83 ENCOUNTER FOR SUPERVISION OF OTHER NORMAL PREGNANCY IN THIRD TRIMESTER: ICD-10-CM

## 2020-11-05 DIAGNOSIS — Z01.818 PRE-OP EXAM: Primary | ICD-10-CM

## 2020-11-05 DIAGNOSIS — L30.9 ECZEMA, UNSPECIFIED TYPE: Primary | ICD-10-CM

## 2020-11-05 PROCEDURE — 99213 OFFICE O/P EST LOW 20 MIN: CPT | Mod: 95 | Performed by: DERMATOLOGY

## 2020-11-05 RX ORDER — BETAMETHASONE DIPROPIONATE 0.5 MG/G
OINTMENT, AUGMENTED TOPICAL 2 TIMES DAILY
Qty: 50 G | Refills: 3 | Status: SHIPPED | OUTPATIENT
Start: 2020-11-05 | End: 2020-12-31

## 2020-11-05 ASSESSMENT — PAIN SCALES - GENERAL: PAINLEVEL: NO PAIN (0)

## 2020-11-05 NOTE — NURSING NOTE
Dermatology Rooming Note    Trina Rooney's goals for this visit include:   Chief Complaint   Patient presents with     Derm Problem     Pruritic rash on hands - Trina states there has been no change      Rob Jordan CMA

## 2020-11-05 NOTE — PATIENT INSTRUCTIONS
[facility] Dermatology Visit    Thank you for allowing us to participate in your care. Your findings, instructions and follow-up plan are as follows:    For your hand rash:  - I'm sorry this isn't better yet.  - The biopsy showed it was likely in the eczema family. No signs of infection.  - Try augmented betamethasone ointment twice daily. At night time, cover with cotton gloves or saran wrap to help medicine absorb better.  - Hope all goes well with the !    Return in 6 weeks, sooner if concerns.     When should I call my doctor?    If you are worsening or not improving, please, contact us or seek urgent care as noted below.     Who should I call with questions (adults)?    [facility] (adult and pediatric): [phone]     [facility] (adult): [phone]    For urgent needs outside of business hours call the [facility] at [phone] and ask for the dermatology resident on call    If this is a medical emergency and you are unable to reach an ER, Call [phone]      Who should I call with questions (pediatric)?  [facility]- Pediatric Dermatology  Dr. Chantal Schaefer, Dr. Ap Reyes, Dr. Lillie Garrett, Karen Farrell, PA  Dr. Chelo Josue, Dr. Trudi Tsai & Dr. Jamie Wilder  Non Urgent  Nurse Triage Line; [phone]- Roxy and Evonne MONET Care Coordinators   Anni (/Complex ) [phone]    If you need a prescription refill, please contact your pharmacy. Refills are approved or denied by our Physicians during normal business hours, Monday through   Per office policy, refills will not be granted if you have not been seen within the past year (or sooner depending on your child's condition)    Scheduling Information:  Pediatric Appointment Scheduling and Call Center [phone]  Radiology Scheduling- [phone]  Sedation Unit Scheduling-  [phone]  [facility] [phone]; Pediatric Dermatology [phone]  Main  Services: [phone]  Kyrgyz: [phone]  Gibraltarian: [phone]  Hmong/Emirati/Icelandic: [phone]  Preadmission Nursing Department Fax Number: [phone] (Fax all pre-operative paperwork to this number)    For urgent matters arising during evenings, weekends, or holidays that cannot wait for normal business hours please call [phone] and ask for the Dermatology Resident On-Call to be paged.

## 2020-11-05 NOTE — LETTER
11/5/2020       RE: Trina Rooney  [address]  [facility] [address]     Dear Colleague,    Thank you for referring your patient, Trina Rooeny, to the [facility] [place] at [facility]. Please see a copy of my visit note below.    [facility] Dermatology Record:  Video: ( Invitation sent by:  [employer] and text to cell phone ) [phone]  Tried for over 10 minutes to get video to work - patient could see me but I couldn't see her, we did a phone call      Dermatology Problem List:  # Pertinent PMH: currently 38 weeks pregnant.  1. Pruritic rash on hands, favor inflammatory. Bx 9/24/2020 with subtle spongiotic dermatitis.  - Trial augmented betamethasone ointment  - Failed clobetasol, TMC, nystatin, permethrin  - Bx 9/24/2020 with subtle spongiotic dermatitis    Encounter Date: Nov 5, 2020    CC:   Chief Complaint   Patient presents with     Derm Problem     Pruritic rash on hands - Trina states there has been no change        History of Present Illness:  Trina Rooney is a 34 year old female who presents for follow-up rash.    We last saw each other on 9/24 at which time obtained bx which showed subtle spongiotic dermatitis. We recommended to continue clobetasol.  Since last visit, doing okay.  Rash on hands still itching.   She is putting the clobetasol on the skin twice daily.  First noted rash was not improving. Then noted the rash on the right hand improved with clobetasol but left hand is spreading and worse.  She is worried she is spreading it to her children.    Her bile acids were normal.  Has a planned C/S next week on 11/12.    ROS: Patient is generally feeling well today.    Physical Examination:  General: Mood pleasant, answers questions appropriately, interactive.   Skin: no exam available, did not send photos and I could not see her on video.    Labs:  Punch bx 9/24/2020  FINAL DIAGNOSIS:   Skin, right thumb, punch:   -  "Subtle spongiotic dermatitis - (see comment and description)     COMMENT:   The findings present in this specimen are not specific for a particular   diagnosis, but raise the possibility   of a partially treated acral eczematous dermatitis. The band of   parakeratosis, referred to by some authors as   the \"last week sign,\" is suggestive of a resolved inflammatory process.   Evidence of a fungal infection is not   present. Clinical correlation is recommended.     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 pylori infection 2013     History of positive PPD      Latent tuberculosis      Placenta abruptio      Past Surgical History:   Procedure Laterality Date     C/SECTION, LOW TRANSVERSE N/A 2014    , Low Transverse      SECTION N/A 2019    Procedure:  SECTION;  Surgeon: Adalgisa Gaona MD;  Location: [facility]       Social History:  Patient reports that she has never smoked. She has never used smokeless tobacco. She reports that she does not drink alcohol or use drugs.    Family History:  Family History   Problem Relation Age of Onset     Family History Negative Other        Medications:  Current Outpatient Medications   Medication     clobetasol (TEMOVATE) 0.05 % external cream     Prenatal Vit-Fe Fumarate-FA (PRENATAL MULTIVITAMIN W/IRON) 27-0.8 MG tablet     vitamin D3 (CHOLECALCIFEROL) 2000 units (50 mcg) tablet     calcium carbonate (TUMS) 500 MG chewable tablet     nystatin (MYCOSTATIN) 047060 UNIT/GM external cream     permethrin (ELIMITE) 5 % external cream     triamcinolone (KENALOG) 0.5 % external ointment     No current facility-administered " medications for this visit.           No Known Allergies        Impression and Recommendations (Patient Counseled on the Following):  1. Pruritic bilateral hand rash in setting of pregnancy (started in her 2nd trimester), unclear etiology but favor inflammatory/eczematous. She has failed clobetasol cream, TMC cream, nystatin, and permethrin however it does sound like the clobetasol led to some temporary improvement but it just recurred. We performed bx 9/24/2020 with subtle spongiotic dermatitis. Since her last visit, it sounds as though the rash on the right hand has improved but left hand persistent. We have no photos or video to evaluate today. Advised follow-up in clinic to further assist however patient has scheduled C/S next week on 11/12 an would prefer to wait to come into clinic until she has recovered from surgery. Counseled that based on the biopsy, do not currently have reason to suspect contagious nature but again, it's difficult to evaluate without seeing in person or on photos. Will try alternate high potency steroid and advised she follow with cotton gloves or saran wrap at night.  - Ideally would eval with photos or in person, plan for recheck in 6 weeks (patient did not want sooner appointment)  - Trial augmented betamethasone ointment BID prn, follow with gloves or saran wrap      Follow-up:   Follow-up with dermatology in approximately 6 weeks. Earlier for new or changing lesions or rash.      Staff only    Priya Morgan MD    Department of Dermatology  [facility]: Phone: [phone], Fax: [phone]  11/5/2020    _____________________________________________________________________________    Teledermatology information:  - Location of patient: [place]  - Patient presented as: return  - Location of teledermatologist:  ([facility] [place] )  - Reason teledermatology  is appropriate:  of National Emergency Regarding Coronavirus disease (COVID 19) Outbreak  - Image quality and interpretability: n/a - none sent and video didn't work  - Physician has received verbal consent for a Video/Photos Visit from the patient? YES  - In-person dermatology visit recommendation: no  - Date of images: n/a - none sent and video didn't work  - Service start time: 11:50 AM  - Service end time: 12:09 PM   - Date of report: 11/5/2020

## 2020-11-05 NOTE — PROGRESS NOTES
" [employer] Dermatology Record:  Video: ( Invitation sent by:  [employer] and text to cell phone ) [phone]  Tried for over 10 minutes to get video to work - patient could see me but I couldn't see her, we did a phone call      Dermatology Problem List:  # Pertinent PMH: currently 38 weeks pregnant.  1. Pruritic rash on hands, favor inflammatory. Bx 9/24/2020 with subtle spongiotic dermatitis.  - Trial augmented betamethasone ointment  - Failed clobetasol, TMC, nystatin, permethrin  - Bx 9/24/2020 with subtle spongiotic dermatitis    Encounter Date: Nov 5, 2020    CC:   Chief Complaint   Patient presents with     Derm Problem     Pruritic rash on hands - Trina states there has been no change        History of Present Illness:  Trina Rooney is a 34 year old female who presents for follow-up rash.    We last saw each other on 9/24 at which time obtained bx which showed subtle spongiotic dermatitis. We recommended to continue clobetasol.  Since last visit, doing okay.  Rash on hands still itching.   She is putting the clobetasol on the skin twice daily.  First noted rash was not improving. Then noted the rash on the right hand improved with clobetasol but left hand is spreading and worse.  She is worried she is spreading it to her children.    Her bile acids were normal.  Has a planned C/S next week on 11/12.    ROS: Patient is generally feeling well today.    Physical Examination:  General: Mood pleasant, answers questions appropriately, interactive.   Skin: no exam available, did not send photos and I could not see her on video.    Labs:  Punch bx 9/24/2020  FINAL DIAGNOSIS:   Skin, right thumb, punch:   - Subtle spongiotic dermatitis - (see comment and description)     COMMENT:   The findings present in this specimen are not specific for a particular   diagnosis, but raise the possibility   of a partially treated acral eczematous dermatitis. The band of   parakeratosis, referred to by some authors as   the \"last " "week sign,\" is suggestive of a resolved inflammatory process.   Evidence of a fungal infection is not   present. Clinical correlation is recommended.     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 pylori infection 2013     History of positive PPD      Latent tuberculosis      Placenta abruptio      Past Surgical History:   Procedure Laterality Date     C/SECTION, LOW TRANSVERSE N/A 2014    , Low Transverse      SECTION N/A 2019    Procedure:  SECTION;  Surgeon: Adalgisa Gaona MD;  Location:  L[facility]D       Social History:  Patient reports that she has never smoked. She has never used smokeless tobacco. She reports that she does not drink alcohol or use drugs.    Family History:  Family History   Problem Relation Age of Onset     Family History Negative Other        Medications:  Current Outpatient Medications   Medication     clobetasol (TEMOVATE) 0.05 % external cream     Prenatal Vit-Fe Fumarate-FA (PRENATAL MULTIVITAMIN W/IRON) 27-0.8 MG tablet     vitamin D3 (CHOLECALCIFEROL) 2000 units (50 mcg) tablet     calcium carbonate (TUMS) 500 MG chewable tablet     nystatin (MYCOSTATIN) 085716 UNIT/GM external cream     permethrin (ELIMITE) 5 % external cream     triamcinolone (KENALOG) 0.5 % external ointment     No current facility-administered medications for this visit.           No Known Allergies        Impression and Recommendations (Patient Counseled on the Following):  1. Pruritic bilateral hand rash in setting of pregnancy (started in her 2nd trimester), unclear etiology but favor inflammatory/eczematous. She has failed clobetasol cream, TMC cream, " nystatin, and permethrin however it does sound like the clobetasol led to some temporary improvement but it just recurred. We performed bx 9/24/2020 with subtle spongiotic dermatitis. Since her last visit, it sounds as though the rash on the right hand has improved but left hand persistent. We have no photos or video to evaluate today. Advised follow-up in clinic to further assist however patient has scheduled C/S next week on 11/12 an would prefer to wait to come into clinic until she has recovered from surgery. Counseled that based on the biopsy, do not currently have reason to suspect contagious nature but again, it's difficult to evaluate without seeing in person or on photos. Will try alternate high potency steroid and advised she follow with cotton gloves or saran wrap at night.  - Ideally would eval with photos or in person, plan for recheck in 6 weeks (patient did not want sooner appointment)  - Trial augmented betamethasone ointment BID prn, follow with gloves or saran wrap      Follow-up:   Follow-up with dermatology in approximately 6 weeks. Earlier for new or changing lesions or rash.      Staff only    Priya Morgan MD    Department of Dermatology  [facility]: Phone: [phone], Fax: [phone]  11/5/2020    _____________________________________________________________________________    Teledermatology information:  - Location of patient: [place]  - Patient presented as: return  - Location of teledermatologist:  ([facility] [place] )  - Reason teledermatology is appropriate:  of National Emergency Regarding Coronavirus disease (COVID 19) Outbreak  - Image quality and interpretability: n/a - none sent and video didn't work  - Physician has received verbal consent for a Video/Photos Visit from the patient? YES  - In-person dermatology visit recommendation: no  - Date of  images: n/a - none sent and video didn't work  - Service start time: 11:50 AM  - Service end time: 12:09 PM   - Date of report: 11/5/2020

## 2020-11-09 ENCOUNTER — PRENATAL OFFICE VISIT (OUTPATIENT)
Dept: OBGYN | Facility: CLINIC | Age: 35
End: 2020-11-09
Payer: COMMERCIAL

## 2020-11-09 VITALS
BODY MASS INDEX: 30.15 KG/M2 | WEIGHT: 186.7 LBS | HEART RATE: 76 BPM | DIASTOLIC BLOOD PRESSURE: 66 MMHG | SYSTOLIC BLOOD PRESSURE: 108 MMHG | TEMPERATURE: 99 F

## 2020-11-09 DIAGNOSIS — Z23 NEED FOR PROPHYLACTIC VACCINATION AND INOCULATION AGAINST INFLUENZA: ICD-10-CM

## 2020-11-09 DIAGNOSIS — Z11.59 ENCOUNTER FOR SCREENING FOR OTHER VIRAL DISEASES: ICD-10-CM

## 2020-11-09 DIAGNOSIS — Z34.83 ENCOUNTER FOR SUPERVISION OF OTHER NORMAL PREGNANCY IN THIRD TRIMESTER: Primary | ICD-10-CM

## 2020-11-09 PROCEDURE — 90686 IIV4 VACC NO PRSV 0.5 ML IM: CPT | Performed by: OBSTETRICS & GYNECOLOGY

## 2020-11-09 PROCEDURE — U0003 INFECTIOUS AGENT DETECTION BY NUCLEIC ACID (DNA OR RNA); SEVERE ACUTE RESPIRATORY SYNDROME CORONAVIRUS 2 (SARS-COV-2) (CORONAVIRUS DISEASE [COVID-19]), AMPLIFIED PROBE TECHNIQUE, MAKING USE OF HIGH THROUGHPUT TECHNOLOGIES AS DESCRIBED BY CMS-2020-01-R: HCPCS | Performed by: OBSTETRICS & GYNECOLOGY

## 2020-11-09 PROCEDURE — 99207 PR PRENATAL VISIT: CPT | Performed by: OBSTETRICS & GYNECOLOGY

## 2020-11-09 PROCEDURE — 90471 IMMUNIZATION ADMIN: CPT | Performed by: OBSTETRICS & GYNECOLOGY

## 2020-11-09 NOTE — PROGRESS NOTES
38w4d  Doing well since last visit.  No contractions, vaginal bleeding or leakage of fluid.  Normal fetal movement.  Had appointment with derm last week, but video visit didn't work.  Patient has set up MyChart yet, so sent link again and showed her how to set it up.  Suggested she could reach out to derm and see if they'd be willing to look at pictures if she sent them in.  Patient concerned her insurance won't cover dermatology after Dec 4.  Confirmed desire for PPTL.  Pre-op labs and COVID test today.  Has soap and gatorade.  RTC for post-partum exam and prn.  Nette Bui MD

## 2020-11-10 LAB
SARS-COV-2 RNA SPEC QL NAA+PROBE: NOT DETECTED
SPECIMEN SOURCE: NORMAL

## 2020-11-11 ENCOUNTER — ANESTHESIA EVENT (OUTPATIENT)
Dept: OBGYN | Facility: CLINIC | Age: 35
End: 2020-11-11
Payer: COMMERCIAL

## 2020-11-11 NOTE — ANESTHESIA PREPROCEDURE EVALUATION
"Anesthesia Pre-Procedure Evaluation    Patient: Trina Rooney   MRN:     [id number] Gender:   female   Age:    34 year old :      1985        Preoperative Diagnosis: History of  delivery [Z98.891]   Procedure(s):   SECTION, WITH POSTPARTUM TUBAL LIGATION     LABS:  CBC:   Lab Results   Component Value Date    WBC 5.6 2020    WBC 4.7 2020    HGB 12.3 2020    HGB 12.1 10/27/2020    HCT 36.8 2020    HCT 36.9 2020     2020     2020     BMP:   Lab Results   Component Value Date     2018     2017    POTASSIUM 3.8 2018    POTASSIUM 3.5 2017    CHLORIDE 109 2018    CHLORIDE 109 2017    CO2 22 2018    CO2 27 2017    BUN 9 2018    BUN 9 2017    CR 0.42 (L) 2018    CR 0.62 2017     (H) 2018    GLC 88 2017     COAGS:   Lab Results   Component Value Date    PTT 30 2019    INR 0.99 2019    FIBR 443 (H) 2019     POC:   Lab Results   Component Value Date    HCG Negative 2017     OTHER:   Lab Results   Component Value Date    HORACIO 8.6 2018    ALBUMIN 2.6 (L) 2020    PROTTOTAL 6.5 (L) 2020    ALT 19 2020    AST 16 2020    ALKPHOS 109 2020    BILITOTAL 0.2 2020    LIPASE 110 2018        Preop Vitals    BP Readings from Last 3 Encounters:   20 108/66   20 109/70   10/27/20 104/73    Pulse Readings from Last 3 Encounters:   20 76   20 73   10/27/20 94      Resp Readings from Last 3 Encounters:   20 16   19 16   18 18    SpO2 Readings from Last 3 Encounters:   20 99%   19 100%   18 98%      Temp Readings from Last 1 Encounters:   20 37.2  C (99  F) (Oral)    Ht Readings from Last 1 Encounters:   10/05/20 1.676 m (5' 5.98\")      Wt Readings from Last 1 Encounters:   20 84.7 kg (186 lb 11.2 oz)    Estimated body mass " "index is 30.15 kg/m  as calculated from the following:    Height as of 10/5/20: 1.676 m (5' 5.98\").    Weight as of 20: 84.7 kg (186 lb 11.2 oz).     LDA:  Peripheral IV 19 Right Hand (Active)   Number of days: 657       Peripheral IV 19 Left Hand (Active)   Number of days: 657        Past Medical History:   Diagnosis Date     H. pylori infection 2013     History of positive PPD      Latent tuberculosis      Placenta abruptio       Past Surgical History:   Procedure Laterality Date     C/SECTION, LOW TRANSVERSE N/A 2014    , Low Transverse      SECTION N/A 2019    Procedure:  SECTION;  Surgeon: Adalgisa Gaona MD;  Location:  L[facility]D      No Known Allergies     Anesthesia Evaluation     .             ROS/MED HX    ENT/Pulmonary:  - neg pulmonary ROS     Neurologic:  - neg neurologic ROS     Cardiovascular:  - neg cardiovascular ROS       METS/Exercise Tolerance:     Hematologic:  - neg hematologic  ROS       Musculoskeletal:  - neg musculoskeletal ROS       GI/Hepatic:  - neg GI/hepatic ROS       Renal/Genitourinary: Comment: Placenta abruption        Endo:  - neg endo ROS       Psychiatric:  - neg psychiatric ROS       Infectious Disease: Comment: Latent tb  H. Pylori infection        Malignancy:      - no malignancy   Other:                         PHYSICAL EXAM:   Mental Status/Neuro:    Airway: Facies: Feasible  Mallampati: I  Mouth/Opening: Full  TM distance: > 6 cm  Neck ROM: Full   Respiratory: Auscultation: CTAB     Resp. Rate: Normal     Resp. Effort: Normal      CV: Rhythm: Regular  Rate: Age appropriate  Heart: Normal Sounds  Edema: None   Comments:                      Assessment:   ASA SCORE: 2      Smoking Status:  Non-Smoker/Unknown   NPO Status: ELEVATED Aspiration Risk/Unknown     Plan:   Anes. Type:  MAC; Spinal; Peripheral Nerve Block; For Post-op pain in coordination with surgeon     Block Details: TAP; Exparel; Single Shot "   Pre-Medication: None   Induction:  IV (RSI)   Airway: Native Airway   Access/Monitoring: PIV   Maintenance: N/a     Postop Plan:   Postop Pain: Regional  Postop Sedation/Airway: Not planned  Disposition: Inpatient/Admit     PONV Management:   Adult Risk Factors: Female, Non-Smoker   Prevention: Ondansetron     CONSENT: Direct conversation   Plan and risks discussed with: Patient   Blood Products: Consented (ALL Blood Products)                   Kenneth Salazar MD

## 2020-11-12 ENCOUNTER — HOSPITAL ENCOUNTER (INPATIENT)
Facility: CLINIC | Age: 35
LOS: 2 days | Discharge: HOME | End: 2020-11-14
Attending: OBSTETRICS & GYNECOLOGY | Admitting: OBSTETRICS & GYNECOLOGY
Payer: COMMERCIAL

## 2020-11-12 ENCOUNTER — MEDICAL CORRESPONDENCE (OUTPATIENT)
Dept: HEALTH INFORMATION MANAGEMENT | Facility: CLINIC | Age: 35
End: 2020-11-12

## 2020-11-12 ENCOUNTER — ANESTHESIA (OUTPATIENT)
Dept: OBGYN | Facility: CLINIC | Age: 35
End: 2020-11-12
Payer: COMMERCIAL

## 2020-11-12 DIAGNOSIS — Z98.891 HISTORY OF CESAREAN DELIVERY: ICD-10-CM

## 2020-11-12 DIAGNOSIS — Z98.891 HISTORY OF CESAREAN DELIVERY: Primary | ICD-10-CM

## 2020-11-12 PROCEDURE — 59514 CESAREAN DELIVERY ONLY: CPT | Mod: 80 | Performed by: OBSTETRICS & GYNECOLOGY

## 2020-11-12 PROCEDURE — 86850 RBC ANTIBODY SCREEN: CPT | Performed by: OBSTETRICS & GYNECOLOGY

## 2020-11-12 PROCEDURE — 761N000003 HC RECOVERY PHASE 1 LEVEL 2 FIRST HR: Performed by: OBSTETRICS & GYNECOLOGY

## 2020-11-12 PROCEDURE — 250N000011 HC RX IP 250 OP 636: Performed by: OBSTETRICS & GYNECOLOGY

## 2020-11-12 PROCEDURE — 258N000003 HC RX IP 258 OP 636

## 2020-11-12 PROCEDURE — C9290 INJ, BUPIVACAINE LIPOSOME: HCPCS

## 2020-11-12 PROCEDURE — 272N000001 HC OR GENERAL SUPPLY STERILE: Performed by: OBSTETRICS & GYNECOLOGY

## 2020-11-12 PROCEDURE — 370N000002 HC ANESTHESIA TECHNICAL FEE, EACH ADDTL 15 MIN: Performed by: OBSTETRICS & GYNECOLOGY

## 2020-11-12 PROCEDURE — 999N000139 HC STATISTIC PRE-PROCEDURE ASSESSMENT II: Performed by: OBSTETRICS & GYNECOLOGY

## 2020-11-12 PROCEDURE — 58611 LIGATE OVIDUCT(S) ADD-ON: CPT | Performed by: OBSTETRICS & GYNECOLOGY

## 2020-11-12 PROCEDURE — 250N000009 HC RX 250

## 2020-11-12 PROCEDURE — 360N000023 HC SURGERY LEVEL 3 EA 15 ADDTL MIN UMMC: Performed by: OBSTETRICS & GYNECOLOGY

## 2020-11-12 PROCEDURE — 250N000011 HC RX IP 250 OP 636

## 2020-11-12 PROCEDURE — 120N000002 HC R&B MED SURG/OB UMMC

## 2020-11-12 PROCEDURE — 250N000011 HC RX IP 250 OP 636: Performed by: STUDENT IN AN ORGANIZED HEALTH CARE EDUCATION/TRAINING PROGRAM

## 2020-11-12 PROCEDURE — 250N000013 HC RX MED GY IP 250 OP 250 PS 637: Performed by: STUDENT IN AN ORGANIZED HEALTH CARE EDUCATION/TRAINING PROGRAM

## 2020-11-12 PROCEDURE — 58611 LIGATE OVIDUCT(S) ADD-ON: CPT | Mod: 80 | Performed by: OBSTETRICS & GYNECOLOGY

## 2020-11-12 PROCEDURE — 761N000004 HC RECOVERY PHASE 1 LEVEL 2 EA ADDTL HR: Performed by: OBSTETRICS & GYNECOLOGY

## 2020-11-12 PROCEDURE — 999N000157 HC STATISTIC RCP TIME EA 10 MIN

## 2020-11-12 PROCEDURE — 250N000013 HC RX MED GY IP 250 OP 250 PS 637

## 2020-11-12 PROCEDURE — 86901 BLOOD TYPING SEROLOGIC RH(D): CPT | Performed by: OBSTETRICS & GYNECOLOGY

## 2020-11-12 PROCEDURE — 370N000001 HC ANESTHESIA TECHNICAL FEE, 1ST 30 MIN: Performed by: OBSTETRICS & GYNECOLOGY

## 2020-11-12 PROCEDURE — 88304 TISSUE EXAM BY PATHOLOGIST: CPT | Mod: TC | Performed by: STUDENT IN AN ORGANIZED HEALTH CARE EDUCATION/TRAINING PROGRAM

## 2020-11-12 PROCEDURE — 88302 TISSUE EXAM BY PATHOLOGIST: CPT | Mod: 26 | Performed by: PATHOLOGY

## 2020-11-12 PROCEDURE — 360N000022 HC SURGERY LEVEL 3 1ST 30 MIN - UMMC: Performed by: OBSTETRICS & GYNECOLOGY

## 2020-11-12 PROCEDURE — 59510 CESAREAN DELIVERY: CPT | Performed by: OBSTETRICS & GYNECOLOGY

## 2020-11-12 PROCEDURE — 999N000016 HC STATISTIC ATTENDANCE AT DELIVERY

## 2020-11-12 PROCEDURE — 271N000001 HC OR GENERAL SUPPLY NON-STERILE: Performed by: OBSTETRICS & GYNECOLOGY

## 2020-11-12 PROCEDURE — 0UB70ZZ EXCISION OF BILATERAL FALLOPIAN TUBES, OPEN APPROACH: ICD-10-PCS | Performed by: OBSTETRICS & GYNECOLOGY

## 2020-11-12 RX ORDER — AMOXICILLIN 250 MG
2 CAPSULE ORAL 2 TIMES DAILY
Status: DISCONTINUED | OUTPATIENT
Start: 2020-11-12 | End: 2020-11-14 | Stop reason: HOSPADM

## 2020-11-12 RX ORDER — MORPHINE SULFATE 1 MG/ML
INJECTION, SOLUTION EPIDURAL; INTRATHECAL; INTRAVENOUS PRN
Status: DISCONTINUED | OUTPATIENT
Start: 2020-11-12 | End: 2020-11-12

## 2020-11-12 RX ORDER — FENTANYL CITRATE 50 UG/ML
15 INJECTION, SOLUTION INTRAMUSCULAR; INTRAVENOUS ONCE
Status: DISCONTINUED | OUTPATIENT
Start: 2020-11-12 | End: 2020-11-12

## 2020-11-12 RX ORDER — NALBUPHINE HYDROCHLORIDE 20 MG/ML
2.5-5 INJECTION, SOLUTION INTRAMUSCULAR; INTRAVENOUS; SUBCUTANEOUS EVERY 6 HOURS PRN
Status: DISCONTINUED | OUTPATIENT
Start: 2020-11-12 | End: 2020-11-12

## 2020-11-12 RX ORDER — ONDANSETRON 4 MG/1
4 TABLET, ORALLY DISINTEGRATING ORAL EVERY 30 MIN PRN
Status: DISCONTINUED | OUTPATIENT
Start: 2020-11-12 | End: 2020-11-12 | Stop reason: HOSPADM

## 2020-11-12 RX ORDER — BUPIVACAINE HYDROCHLORIDE 2.5 MG/ML
INJECTION, SOLUTION EPIDURAL; INFILTRATION; INTRACAUDAL PRN
Status: DISCONTINUED | OUTPATIENT
Start: 2020-11-12 | End: 2020-11-12

## 2020-11-12 RX ORDER — SODIUM CHLORIDE, SODIUM LACTATE, POTASSIUM CHLORIDE, CALCIUM CHLORIDE 600; 310; 30; 20 MG/100ML; MG/100ML; MG/100ML; MG/100ML
INJECTION, SOLUTION INTRAVENOUS CONTINUOUS PRN
Status: DISCONTINUED | OUTPATIENT
Start: 2020-11-12 | End: 2020-11-12

## 2020-11-12 RX ORDER — CEFAZOLIN SODIUM 1 G/3ML
1 INJECTION, POWDER, FOR SOLUTION INTRAMUSCULAR; INTRAVENOUS SEE ADMIN INSTRUCTIONS
Status: DISCONTINUED | OUTPATIENT
Start: 2020-11-12 | End: 2020-11-12 | Stop reason: HOSPADM

## 2020-11-12 RX ORDER — KETOROLAC TROMETHAMINE 30 MG/ML
30 INJECTION, SOLUTION INTRAMUSCULAR; INTRAVENOUS EVERY 6 HOURS
Status: COMPLETED | OUTPATIENT
Start: 2020-11-12 | End: 2020-11-13

## 2020-11-12 RX ORDER — ONDANSETRON 2 MG/ML
INJECTION INTRAMUSCULAR; INTRAVENOUS PRN
Status: DISCONTINUED | OUTPATIENT
Start: 2020-11-12 | End: 2020-11-12

## 2020-11-12 RX ORDER — SODIUM CHLORIDE, SODIUM LACTATE, POTASSIUM CHLORIDE, CALCIUM CHLORIDE 600; 310; 30; 20 MG/100ML; MG/100ML; MG/100ML; MG/100ML
INJECTION, SOLUTION INTRAVENOUS CONTINUOUS
Status: DISCONTINUED | OUTPATIENT
Start: 2020-11-12 | End: 2020-11-12 | Stop reason: HOSPADM

## 2020-11-12 RX ORDER — DIPHENHYDRAMINE HCL 25 MG
25 CAPSULE ORAL EVERY 6 HOURS PRN
Status: DISCONTINUED | OUTPATIENT
Start: 2020-11-12 | End: 2020-11-14 | Stop reason: HOSPADM

## 2020-11-12 RX ORDER — LIDOCAINE 40 MG/G
CREAM TOPICAL
Status: DISCONTINUED | OUTPATIENT
Start: 2020-11-12 | End: 2020-11-14 | Stop reason: HOSPADM

## 2020-11-12 RX ORDER — SODIUM CHLORIDE, SODIUM LACTATE, POTASSIUM CHLORIDE, CALCIUM CHLORIDE 600; 310; 30; 20 MG/100ML; MG/100ML; MG/100ML; MG/100ML
INJECTION, SOLUTION INTRAVENOUS
Status: COMPLETED
Start: 2020-11-12 | End: 2020-11-12

## 2020-11-12 RX ORDER — METHYLERGONOVINE MALEATE 0.2 MG/ML
200 INJECTION INTRAVENOUS
Status: DISCONTINUED | OUTPATIENT
Start: 2020-11-12 | End: 2020-11-14 | Stop reason: HOSPADM

## 2020-11-12 RX ORDER — CARBOPROST TROMETHAMINE 250 UG/ML
250 INJECTION, SOLUTION INTRAMUSCULAR
Status: DISCONTINUED | OUTPATIENT
Start: 2020-11-12 | End: 2020-11-14 | Stop reason: HOSPADM

## 2020-11-12 RX ORDER — CITRIC ACID/SODIUM CITRATE 334-500MG
SOLUTION, ORAL ORAL
Status: COMPLETED
Start: 2020-11-12 | End: 2020-11-12

## 2020-11-12 RX ORDER — OXYTOCIN/0.9 % SODIUM CHLORIDE 30/500 ML
PLASTIC BAG, INJECTION (ML) INTRAVENOUS CONTINUOUS PRN
Status: DISCONTINUED | OUTPATIENT
Start: 2020-11-12 | End: 2020-11-12

## 2020-11-12 RX ORDER — HYDROCORTISONE 2.5 %
CREAM (GRAM) TOPICAL 3 TIMES DAILY PRN
Status: DISCONTINUED | OUTPATIENT
Start: 2020-11-12 | End: 2020-11-14 | Stop reason: HOSPADM

## 2020-11-12 RX ORDER — MISOPROSTOL 200 UG/1
800 TABLET ORAL
Status: DISCONTINUED | OUTPATIENT
Start: 2020-11-12 | End: 2020-11-14 | Stop reason: HOSPADM

## 2020-11-12 RX ORDER — NALOXONE HYDROCHLORIDE 0.4 MG/ML
.1-.4 INJECTION, SOLUTION INTRAMUSCULAR; INTRAVENOUS; SUBCUTANEOUS
Status: DISCONTINUED | OUTPATIENT
Start: 2020-11-12 | End: 2020-11-12

## 2020-11-12 RX ORDER — DEXTROSE, SODIUM CHLORIDE, SODIUM LACTATE, POTASSIUM CHLORIDE, AND CALCIUM CHLORIDE 5; .6; .31; .03; .02 G/100ML; G/100ML; G/100ML; G/100ML; G/100ML
INJECTION, SOLUTION INTRAVENOUS CONTINUOUS
Status: DISCONTINUED | OUTPATIENT
Start: 2020-11-12 | End: 2020-11-14 | Stop reason: HOSPADM

## 2020-11-12 RX ORDER — CEFAZOLIN SODIUM 2 G/100ML
2 INJECTION, SOLUTION INTRAVENOUS
Status: COMPLETED | OUTPATIENT
Start: 2020-11-12 | End: 2020-11-12

## 2020-11-12 RX ORDER — OXYTOCIN/0.9 % SODIUM CHLORIDE 30/500 ML
340 PLASTIC BAG, INJECTION (ML) INTRAVENOUS CONTINUOUS PRN
Status: DISCONTINUED | OUTPATIENT
Start: 2020-11-12 | End: 2020-11-14 | Stop reason: HOSPADM

## 2020-11-12 RX ORDER — FENTANYL CITRATE-0.9 % NACL/PF 10 MCG/ML
PLASTIC BAG, INJECTION (ML) INTRAVENOUS CONTINUOUS PRN
Status: DISCONTINUED | OUTPATIENT
Start: 2020-11-12 | End: 2020-11-12

## 2020-11-12 RX ORDER — EPHEDRINE SULFATE 50 MG/ML
5 INJECTION, SOLUTION INTRAMUSCULAR; INTRAVENOUS; SUBCUTANEOUS
Status: DISCONTINUED | OUTPATIENT
Start: 2020-11-12 | End: 2020-11-12

## 2020-11-12 RX ORDER — MORPHINE SULFATE 1 MG/ML
150 INJECTION, SOLUTION EPIDURAL; INTRATHECAL; INTRAVENOUS ONCE
Status: DISCONTINUED | OUTPATIENT
Start: 2020-11-12 | End: 2020-11-12

## 2020-11-12 RX ORDER — AMOXICILLIN 250 MG
1 CAPSULE ORAL 2 TIMES DAILY
Status: DISCONTINUED | OUTPATIENT
Start: 2020-11-12 | End: 2020-11-14 | Stop reason: HOSPADM

## 2020-11-12 RX ORDER — METHYLERGONOVINE MALEATE 0.2 MG/ML
INJECTION INTRAVENOUS PRN
Status: DISCONTINUED | OUTPATIENT
Start: 2020-11-12 | End: 2020-11-12

## 2020-11-12 RX ORDER — OXYTOCIN 10 [USP'U]/ML
10 INJECTION, SOLUTION INTRAMUSCULAR; INTRAVENOUS
Status: DISCONTINUED | OUTPATIENT
Start: 2020-11-12 | End: 2020-11-14 | Stop reason: HOSPADM

## 2020-11-12 RX ORDER — LIDOCAINE 40 MG/G
CREAM TOPICAL
Status: DISCONTINUED | OUTPATIENT
Start: 2020-11-12 | End: 2020-11-12

## 2020-11-12 RX ORDER — FENTANYL CITRATE 50 UG/ML
INJECTION, SOLUTION INTRAMUSCULAR; INTRAVENOUS PRN
Status: DISCONTINUED | OUTPATIENT
Start: 2020-11-12 | End: 2020-11-12

## 2020-11-12 RX ORDER — OXYCODONE HYDROCHLORIDE 5 MG/1
5 TABLET ORAL EVERY 4 HOURS PRN
Status: DISCONTINUED | OUTPATIENT
Start: 2020-11-12 | End: 2020-11-14 | Stop reason: HOSPADM

## 2020-11-12 RX ORDER — ACETAMINOPHEN 325 MG/1
975 TABLET ORAL EVERY 6 HOURS
Status: DISCONTINUED | OUTPATIENT
Start: 2020-11-12 | End: 2020-11-14 | Stop reason: HOSPADM

## 2020-11-12 RX ORDER — KETOROLAC TROMETHAMINE 30 MG/ML
INJECTION, SOLUTION INTRAMUSCULAR; INTRAVENOUS PRN
Status: DISCONTINUED | OUTPATIENT
Start: 2020-11-12 | End: 2020-11-12

## 2020-11-12 RX ORDER — CITRIC ACID/SODIUM CITRATE 334-500MG
30 SOLUTION, ORAL ORAL
Status: DISCONTINUED | OUTPATIENT
Start: 2020-11-12 | End: 2020-11-12

## 2020-11-12 RX ORDER — DIPHENHYDRAMINE HYDROCHLORIDE 50 MG/ML
25 INJECTION INTRAMUSCULAR; INTRAVENOUS EVERY 6 HOURS PRN
Status: DISCONTINUED | OUTPATIENT
Start: 2020-11-12 | End: 2020-11-14 | Stop reason: HOSPADM

## 2020-11-12 RX ORDER — OXYTOCIN/0.9 % SODIUM CHLORIDE 30/500 ML
100 PLASTIC BAG, INJECTION (ML) INTRAVENOUS CONTINUOUS
Status: DISCONTINUED | OUTPATIENT
Start: 2020-11-12 | End: 2020-11-14 | Stop reason: HOSPADM

## 2020-11-12 RX ORDER — CITRIC ACID/SODIUM CITRATE 334-500MG
30 SOLUTION, ORAL ORAL
Status: COMPLETED | OUTPATIENT
Start: 2020-11-12 | End: 2020-11-12

## 2020-11-12 RX ORDER — BISACODYL 10 MG
10 SUPPOSITORY, RECTAL RECTAL DAILY PRN
Status: DISCONTINUED | OUTPATIENT
Start: 2020-11-14 | End: 2020-11-14 | Stop reason: HOSPADM

## 2020-11-12 RX ORDER — SIMETHICONE 80 MG
80 TABLET,CHEWABLE ORAL 4 TIMES DAILY PRN
Status: DISCONTINUED | OUTPATIENT
Start: 2020-11-12 | End: 2020-11-14 | Stop reason: HOSPADM

## 2020-11-12 RX ORDER — BUPIVACAINE HYDROCHLORIDE 7.5 MG/ML
INJECTION, SOLUTION INTRASPINAL PRN
Status: DISCONTINUED | OUTPATIENT
Start: 2020-11-12 | End: 2020-11-12

## 2020-11-12 RX ORDER — ONDANSETRON 2 MG/ML
4 INJECTION INTRAMUSCULAR; INTRAVENOUS EVERY 6 HOURS PRN
Status: DISCONTINUED | OUTPATIENT
Start: 2020-11-12 | End: 2020-11-14 | Stop reason: HOSPADM

## 2020-11-12 RX ORDER — NALOXONE HYDROCHLORIDE 0.4 MG/ML
.1-.4 INJECTION, SOLUTION INTRAMUSCULAR; INTRAVENOUS; SUBCUTANEOUS
Status: DISCONTINUED | OUTPATIENT
Start: 2020-11-12 | End: 2020-11-14 | Stop reason: HOSPADM

## 2020-11-12 RX ORDER — ONDANSETRON 2 MG/ML
4 INJECTION INTRAMUSCULAR; INTRAVENOUS EVERY 30 MIN PRN
Status: DISCONTINUED | OUTPATIENT
Start: 2020-11-12 | End: 2020-11-12 | Stop reason: HOSPADM

## 2020-11-12 RX ORDER — MODIFIED LANOLIN
OINTMENT (GRAM) TOPICAL
Status: DISCONTINUED | OUTPATIENT
Start: 2020-11-12 | End: 2020-11-14 | Stop reason: HOSPADM

## 2020-11-12 RX ORDER — IBUPROFEN 800 MG/1
800 TABLET, FILM COATED ORAL EVERY 6 HOURS
Status: DISCONTINUED | OUTPATIENT
Start: 2020-11-13 | End: 2020-11-14 | Stop reason: HOSPADM

## 2020-11-12 RX ADMIN — ACETAMINOPHEN 975 MG: 325 TABLET, FILM COATED ORAL at 21:21

## 2020-11-12 RX ADMIN — KETOROLAC TROMETHAMINE 30 MG: 30 INJECTION, SOLUTION INTRAMUSCULAR at 13:03

## 2020-11-12 RX ADMIN — ACETAMINOPHEN 975 MG: 325 TABLET, FILM COATED ORAL at 13:56

## 2020-11-12 RX ADMIN — PHENYLEPHRINE HYDROCHLORIDE 100 MCG: 10 INJECTION INTRAVENOUS at 12:29

## 2020-11-12 RX ADMIN — MORPHINE SULFATE 0.15 MG: 1 INJECTION EPIDURAL; INTRATHECAL; INTRAVENOUS at 11:56

## 2020-11-12 RX ADMIN — BUPIVACAINE HYDROCHLORIDE 20 ML: 2.5 INJECTION, SOLUTION EPIDURAL; INFILTRATION; INTRACAUDAL at 13:20

## 2020-11-12 RX ADMIN — OXYTOCIN-SODIUM CHLORIDE 0.9% IV SOLN 30 UNIT/500ML 600 ML/HR: 30-0.9/5 SOLUTION at 12:21

## 2020-11-12 RX ADMIN — ONDANSETRON 4 MG: 2 INJECTION INTRAMUSCULAR; INTRAVENOUS at 21:22

## 2020-11-12 RX ADMIN — ONDANSETRON 4 MG: 2 INJECTION INTRAMUSCULAR; INTRAVENOUS at 12:44

## 2020-11-12 RX ADMIN — KETOROLAC TROMETHAMINE 30 MG: 30 INJECTION, SOLUTION INTRAMUSCULAR; INTRAVENOUS at 21:21

## 2020-11-12 RX ADMIN — OXYTOCIN-SODIUM CHLORIDE 0.9% IV SOLN 30 UNIT/500ML: 30-0.9/5 SOLUTION at 13:10

## 2020-11-12 RX ADMIN — Medication 2 G: at 12:01

## 2020-11-12 RX ADMIN — BUPIVACAINE HYDROCHLORIDE IN DEXTROSE 1.8 ML: 7.5 INJECTION, SOLUTION SUBARACHNOID at 11:56

## 2020-11-12 RX ADMIN — Medication 30 ML: at 11:22

## 2020-11-12 RX ADMIN — SODIUM CHLORIDE, SODIUM LACTATE, POTASSIUM CHLORIDE, CALCIUM CHLORIDE: 600; 310; 30; 20 INJECTION, SOLUTION INTRAVENOUS at 10:10

## 2020-11-12 RX ADMIN — SODIUM CITRATE AND CITRIC ACID MONOHYDRATE 30 ML: 500; 334 SOLUTION ORAL at 11:22

## 2020-11-12 RX ADMIN — BUPIVACAINE 20 ML: 13.3 INJECTION, SUSPENSION, LIPOSOMAL INFILTRATION at 13:20

## 2020-11-12 RX ADMIN — METHYLERGONOVINE MALEATE 200 MCG: 0.2 INJECTION INTRAMUSCULAR; INTRAVENOUS at 12:25

## 2020-11-12 RX ADMIN — DOCUSATE SODIUM AND SENNOSIDES 1 TABLET: 8.6; 5 TABLET ORAL at 21:21

## 2020-11-12 RX ADMIN — SODIUM CHLORIDE, POTASSIUM CHLORIDE, SODIUM LACTATE AND CALCIUM CHLORIDE: 600; 310; 30; 20 INJECTION, SOLUTION INTRAVENOUS at 11:46

## 2020-11-12 RX ADMIN — SODIUM CHLORIDE, POTASSIUM CHLORIDE, SODIUM LACTATE AND CALCIUM CHLORIDE: 600; 310; 30; 20 INJECTION, SOLUTION INTRAVENOUS at 10:10

## 2020-11-12 RX ADMIN — Medication 50 MCG/MIN: at 11:56

## 2020-11-12 RX ADMIN — SODIUM CHLORIDE, POTASSIUM CHLORIDE, SODIUM LACTATE AND CALCIUM CHLORIDE: 600; 310; 30; 20 INJECTION, SOLUTION INTRAVENOUS at 12:11

## 2020-11-12 RX ADMIN — FENTANYL CITRATE 15 MCG: 50 INJECTION, SOLUTION INTRAMUSCULAR; INTRAVENOUS at 11:56

## 2020-11-12 ASSESSMENT — ACTIVITIES OF DAILY LIVING (ADL)
HEARING_DIFFICULTY_OR_DEAF: NO
DRESSING/BATHING_DIFFICULTY: NO
DIFFICULTY_COMMUNICATING: NO
WEAR_GLASSES_OR_BLIND: NO
TOILETING_ISSUES: NO
DIFFICULTY_EATING/SWALLOWING: NO
CONCENTRATING,_REMEMBERING_OR_MAKING_DECISIONS_DIFFICULTY: NO
DOING_ERRANDS_INDEPENDENTLY_DIFFICULTY: NO
FALL_HISTORY_WITHIN_LAST_SIX_MONTHS: NO
WALKING_OR_CLIMBING_STAIRS_DIFFICULTY: NO

## 2020-11-12 ASSESSMENT — MIFFLIN-ST. JEOR: SCORE: 1565.44

## 2020-11-12 NOTE — ANESTHESIA PROCEDURE NOTES
Peripheral Nerve Block Procedure Note      Staff -   Anesthesiologist:  Ellen Nj MD  Performed By: anesthesiologist  Location: OR AFTER induction  Procedure Start/Stop TImes:      11/12/2020 1:15 PM     11/12/2020 1:20 PM    patient identified, IV checked, site marked, risks and benefits discussed, informed consent, monitors and equipment checked, pre-op evaluation, at physician/surgeon's request and post-op pain management      Correct Patient: Yes      Correct Position: Yes      Correct Site: Yes      Correct Procedure: Yes      Correct Laterality:  Yes    Site Marked:  Yes  Procedure details:     Procedure:  TAP    ASA:  2    Diagnosis:  C/S    Laterality:  Bilateral    Position:  Supine    Sterile Prep: chloraprep, mask and sterile gloves      Local skin infiltration:  None    Needle:  Short bevel    Needle gauge:  21    Needle length (inches):  4    Ultrasound: Yes      Ultrasound used to identify targeted nerve, plexus, or vascular structure and placed a needle adjacent to it      Permanent Image entered into patiient's record      Abnormal pain on injection: No      Blood Aspirated: No      Paresthesias:  No    Bleeding at site: No      Bolus via:  Needle    Infusion Method:  Single Shot    Complications:  None  Assessment/Narrative:     Injection made incrementally with aspirations every (mL):  5

## 2020-11-12 NOTE — H&P
[facility]     History and Physical  Obstetrics and Gynecology     Date of Admission:  2020    Assessment & Plan   Trina Rooney is a 34 year old female who presents for scheduled  delivery  ASSESSMENT:   IUP @ 39w0d   NST reactive.  Category  I    PLAN:   Pre-op labs appropriate  COVID negative  Plan to proceed with scheduled .    Nette Bui    History of Present Illness   Trina Rooney is a 34 year old female  39w0d  Estimated Date of Delivery: 20 is calculated from Patient's last menstrual period was 2020. [facility] .  Doing well today.  No concerns or complaints    PRENATAL COURSE  Prenatal course was essentially uncomplicated      Recent Labs   Lab Test 20  1302   ABO O   RH Pos   AS Neg     Rhogam not indicated   Recent Labs   Lab Test 10/27/20  1615 20  1220   HEPBANG  --  Nonreactive   HIAGAB  --  Nonreactive   GBS Negative  --    RUQIGG  --  9       Past Medical History    I have reviewed this patient's medical history and updated it with pertinent information if needed.   Past Medical History:   Diagnosis Date     H. pylori infection 2013     History of positive PPD      Latent tuberculosis      Placenta abruptio        Past Surgical History   I have reviewed this patient's surgical history and updated it with pertinent information if needed.  Past Surgical History:   Procedure Laterality Date     C/SECTION, LOW TRANSVERSE N/A 2014    , Low Transverse x2      SECTION N/A 2019    Procedure:  SECTION;  Surgeon: Adalgisa Gaona MD;  Location:  L+D       Prior to Admission Medications   Prior to Admission Medications   Prescriptions Last Dose Informant Patient Reported? Taking?   Prenatal Vit-Fe Fumarate-FA (PRENATAL MULTIVITAMIN W/IRON) 27-0.8 MG tablet 2020 at Unknown time  No Yes   Sig: Take 1  tablet by mouth daily   augmented betamethasone dipropionate (DIPROLENE-AF) 0.05 % external ointment Past Week at Unknown time  No Yes   Sig: Apply topically 2 times daily To hands, can cover with cotton gloves or saran wrap   vitamin D3 (CHOLECALCIFEROL) 2000 units (50 mcg) tablet 11/11/2020 at Unknown time  No Yes   Sig: Take 1 tablet (2,000 Units) by mouth daily      Facility-Administered Medications: None     Allergies   No Known Allergies    Social History   I have reviewed this patient's social history and updated it with pertinent information if needed. Trina Rooney  reports that she has never smoked. She has never used smokeless tobacco. She reports that she does not drink alcohol or use drugs.    Family History   I have reviewed this patient's family history and updated it with pertinent information if needed.   Family History   Problem Relation Age of Onset     Family History Negative Other        Immunization History   Immunization History   Administered Date(s) Administered     HepB-Adult 12/27/2011, 09/04/2013, 10/21/2013, 09/19/2018, 10/15/2018, 03/28/2019     Influenza (IIV3) PF 10/02/2013     Influenza Vaccine IM > 6 months Valent IIV4 09/19/2018, 11/09/2020     MMR 12/30/2011     MMR/V 12/30/2011     Rubella Immunity: Titer/md Dx 08/09/2018     TDAP Vaccine (Adacel) 12/27/2011, 10/02/2013, 11/26/2018, 10/05/2020     Varicella 12/30/2011       Physical Exam   Temp: 98.2  F (36.8  C) Temp src: Oral BP: 101/63 Pulse: 88   Resp: 20        Vital Signs with Ranges  Temp:  [98.2  F (36.8  C)] 98.2  F (36.8  C)  Pulse:  [88] 88  Resp:  [20] 20  BP: (101)/(63) 101/63    Abdomen: gravid, single vertex fetus, non-tender, EFW 8 lbs   Cervical Exam: deferred    Fetal Heart Tones: 150 baseline, moderate variablility, + accels, no decels and Category I  TOCO:   none    Constitutional: healthy, alert, active and no distress   Respiratory: no increased work of breathing  Cardiovascular: normal pulse.  No  cyanosis  Skin/Extremites: no bruising or bleeding, normal skin color, texture, turgor and no redness, warmth, or swelling  Neurologic: Awake, alert, oriented to name, place and time.  Cranial nerves II-XII are grossly intact.    Neuropsychiatric: appropriate mood and affect

## 2020-11-12 NOTE — ANESTHESIA PROCEDURE NOTES
Spinal/LP Procedure Note    Spinal Block      Staff -   Anesthesiologist:  Ellen Nj MD  Resident/Fellow: Kenneth Salazar MD  Performed By: resident  Procedure performed by resident/CRNA in presence of a teaching physician.    Location: OB and OR  Procedure Start/Stop Times:      11/12/2020 11:53 AM     11/12/2020 11:56 AM    Correct Patient: Yes      Correct Position: Yes      Correct Site: Yes      Correct Procedure: Yes      Correct Laterality:  Yes    Site Marked:  Yes  Procedure:     Procedure:  Intrathecal    ASA:  2    Diagnosis:  Cs    Position:  Sitting    Sterile Prep: chloraprep, mask, sterile gloves and patient draped      Insertion site:  L3-4    Approach:  Midline    Needle Type:  Pencan    Needle gauge (G):  25    Local Skin Infiltration:  1% lidocaine    amount (ml):  3    Needle Length (in):  3.5    Introducer used: Yes      Introducer gauge:  20 G    Attempts:  1    Redirects:  0    CSF:  Clear    Paresthesias:  No    Time injected:  11:56

## 2020-11-12 NOTE — PLAN OF CARE
Patient and baby are doing well.  No complications at this time.  She and her spouse are bonding appropriately.  She is tolerating ice chips and water. She denies pain.

## 2020-11-12 NOTE — ANESTHESIA POSTPROCEDURE EVALUATION
Anesthesia POST Procedure Evaluation    Patient: Trina Rooney   MRN:     [id number] Gender:   female   Age:    34 year old :      1985        Preoperative Diagnosis: History of  delivery [Z98.891]   Procedure(s):   SECTION, WITH POSTPARTUM TUBAL LIGATION   Postop Comments: No value filed.     Anesthesia Type: MAC, Spinal, Peripheral Nerve Block, For Post-op pain in coordination with surgeon       Disposition: Admission   Postop Pain Control: Uneventful            Sign Out: Well controlled pain   PONV: No   Neuro/Psych: Uneventful            Sign Out: Acceptable/Baseline neuro status   Airway/Respiratory: Uneventful            Sign Out: Acceptable/Baseline resp. status   CV/Hemodynamics: Uneventful            Sign Out: Acceptable CV status   Other NRE: NONE   DID A NON-ROUTINE EVENT OCCUR? No         Last Anesthesia Record Vitals:  CRNA VITALS  2020 1253 - 2020 1353      2020             EKG:  Sinus rhythm          Last PACU Vitals:  Vitals Value Taken Time   /78 20 1515   Temp 35.8  C (96.4  F) 20 1330   Pulse 62 20 1516   Resp 17 20 1516   SpO2 100 % 20 1516   Temp src     NIBP     Pulse     SpO2     Resp     Temp     Ht Rate     Temp 2     Vitals shown include unvalidated device data.      Electronically Signed By: Ellen Nj MD, 2020, 3:17 PM

## 2020-11-12 NOTE — ANESTHESIA CARE TRANSFER NOTE
Patient: Trina ANDREW Bulte    Procedure(s):   SECTION, WITH POSTPARTUM TUBAL LIGATION    Diagnosis: History of  delivery [Z98.891]  Diagnosis Additional Information: No value filed.    Anesthesia Type:   MAC, Spinal, Peripheral Nerve Block, For Post-op pain in coordination with surgeon     Note:  Airway :Room Air  Patient transferred to:PACU  Handoff Report: Identifed the Patient, Identified the Reponsible Provider, Reviewed the pertinent medical history, Discussed the surgical course, Reviewed Intra-OP anesthesia mangement and issues during anesthesia, Set expectations for post-procedure period and Allowed opportunity for questions and acknowledgement of understanding      Vitals: (Last set prior to Anesthesia Care Transfer)    CRNA VITALS  2020 1253 - 2020 1328      2020             EKG:  Sinus rhythm                Electronically Signed By: Kenneth Salazar MD  2020  1:28 PM

## 2020-11-12 NOTE — PLAN OF CARE
Data: Transfer to postpartum was delayed due to change of shift. Trina Rooney transferred to 7145 via cart at 1600. Baby transferred via mother's arms.  Action: Receiving unit notified of transfer: Yes. Patient and family notified of room change. Report given to Angela RN at bedside. Belongings sent to receiving unit. Accompanied by Registered Nurse. Oriented patient to surroundings. Call light within reach. ID bands double-checked with receiving RN. Head removed upon arrival to postpartum. Peripad weighed and added 138ml to QBL.   Response: Patient tolerated transfer and is stable.

## 2020-11-13 LAB — HGB BLD-MCNC: 11.4 G/DL (ref 11.7–15.7)

## 2020-11-13 PROCEDURE — 85018 HEMOGLOBIN: CPT | Performed by: STUDENT IN AN ORGANIZED HEALTH CARE EDUCATION/TRAINING PROGRAM

## 2020-11-13 PROCEDURE — 250N000011 HC RX IP 250 OP 636: Performed by: STUDENT IN AN ORGANIZED HEALTH CARE EDUCATION/TRAINING PROGRAM

## 2020-11-13 PROCEDURE — 250N000013 HC RX MED GY IP 250 OP 250 PS 637: Performed by: STUDENT IN AN ORGANIZED HEALTH CARE EDUCATION/TRAINING PROGRAM

## 2020-11-13 PROCEDURE — 36415 COLL VENOUS BLD VENIPUNCTURE: CPT | Performed by: STUDENT IN AN ORGANIZED HEALTH CARE EDUCATION/TRAINING PROGRAM

## 2020-11-13 PROCEDURE — 120N000002 HC R&B MED SURG/OB UMMC

## 2020-11-13 RX ORDER — AMOXICILLIN 250 MG
1 CAPSULE ORAL DAILY
Qty: 100 TABLET | Refills: 0 | Status: SHIPPED | OUTPATIENT
Start: 2020-11-13 | End: 2020-12-31

## 2020-11-13 RX ORDER — BETAMETHASONE DIPROPIONATE 0.05 %
GEL (GRAM) TOPICAL 2 TIMES DAILY
Status: DISCONTINUED | OUTPATIENT
Start: 2020-11-13 | End: 2020-11-14 | Stop reason: HOSPADM

## 2020-11-13 RX ORDER — IBUPROFEN 600 MG/1
600 TABLET, FILM COATED ORAL EVERY 6 HOURS PRN
Qty: 60 TABLET | Refills: 0 | Status: SHIPPED | OUTPATIENT
Start: 2020-11-13 | End: 2020-12-31

## 2020-11-13 RX ORDER — ACETAMINOPHEN 325 MG/1
650 TABLET ORAL EVERY 6 HOURS PRN
Qty: 100 TABLET | Refills: 0 | Status: SHIPPED | OUTPATIENT
Start: 2020-11-13 | End: 2020-12-31

## 2020-11-13 RX ADMIN — ACETAMINOPHEN 975 MG: 325 TABLET, FILM COATED ORAL at 15:13

## 2020-11-13 RX ADMIN — ACETAMINOPHEN 975 MG: 325 TABLET, FILM COATED ORAL at 21:06

## 2020-11-13 RX ADMIN — IBUPROFEN 800 MG: 800 TABLET ORAL at 21:06

## 2020-11-13 RX ADMIN — DOCUSATE SODIUM AND SENNOSIDES 2 TABLET: 8.6; 5 TABLET ORAL at 09:14

## 2020-11-13 RX ADMIN — DOCUSATE SODIUM AND SENNOSIDES 2 TABLET: 8.6; 5 TABLET ORAL at 21:06

## 2020-11-13 RX ADMIN — ACETAMINOPHEN 975 MG: 325 TABLET, FILM COATED ORAL at 03:12

## 2020-11-13 RX ADMIN — ACETAMINOPHEN 975 MG: 325 TABLET, FILM COATED ORAL at 09:13

## 2020-11-13 RX ADMIN — KETOROLAC TROMETHAMINE 30 MG: 30 INJECTION, SOLUTION INTRAMUSCULAR; INTRAVENOUS at 09:14

## 2020-11-13 RX ADMIN — IBUPROFEN 800 MG: 800 TABLET ORAL at 15:13

## 2020-11-13 RX ADMIN — KETOROLAC TROMETHAMINE 30 MG: 30 INJECTION, SOLUTION INTRAMUSCULAR; INTRAVENOUS at 03:11

## 2020-11-13 NOTE — PLAN OF CARE
Data: Vital signs within normal limits. Postpartum checks within normal limits - see flow record. Patient eating and drinking normally. Patient straight cathed for 600ml is up ambulating due to void again. No apparent signs of infection. Incision healing well. Patient performing self cares and is able to care for infant.  Action: Patient medicated during the shift for pain and cramping. See MAR. Patient reassessed within 1 hour after each medication and pain was improved - patient stated she was comfortable. Patient education done about hand expression and BF Q2-3 hrs. See flow record.  Response: Positive attachment behaviors observed with infant. Support persons not present.   Plan: Anticipate discharge 1-2 days

## 2020-11-13 NOTE — PROVIDER NOTIFICATION
11/13/20 0000   Provider Notification   Provider Name/Title Cathy   Method of Notification Electronic Page   Request Evaluate-Remote   Notification Reason Other  (pt has home med need orders for med label)     Pt has home med betamethasone cream for peeling hands. Need orders for med so can get label made. TY

## 2020-11-13 NOTE — PLAN OF CARE
VSS and postpartum assessments WDL, ex experienced a bout of emesis around 2100. IV zofran given. Patient is hesitant to eat, but is trying crackers.  Up with a standby assist, but has a steady gait.  Bonding well with baby boy (as of yet unnamed).      Breastfeeding on cue with some encouragement. Mother is very convinced she does not have enough milk and requested donor milk. I asked if I could attempt to hand express some milk to show her what she has. I was able to easily hand express a puddle into a spoon. I expressed to her that this was a wonderful amount of milk and it was exactly what baby needed. A few minutes later, she asked again if she could have donor milk. I asked her what specifically she was concerned about that was making her request the milk. She stated that the baby was sleepy and must be hungry. I then spent about 10 minutes providing thorough education about normal  behavior and expectations of him and of her body with producing milk. I believe this education would be beneficial to hear from another source as a way to reinforce it. And though her English is quite proficient, I believe the use of an ipad  would further support the patient's understanding.      Also of note, her rose was removed at 1615, and despite an attempt at 2030 to void, she has yet to do so.    Pain managed with tylenol and toradol.  PIV in left antecubital.  was here earlier, but went home to be with their other children.

## 2020-11-13 NOTE — OP NOTE
[facility]  Full Operative Progress Note      Patient:   Trina Rooney  MRN:    [id number]     Surgery Date:              2020   Surgeon:                      Nette Bui MD  Assistants:                  Gisel Nolan, PGY-2, Nette Apple MD  The assistance of this surgeon was required due to the need for additional skilled surgical hands to retract and hold instruments due to the nature of the surgical procedure and risk of complications given two previous  deliveries, the second of which was complicated by adhesive disease.                               Pre-op Diagnosis:                     1. Intrauterine pregnancy at 39w0d   2. History of  delivery x 2  3.  Desire for permanent sterilization      Post-op Diagnosis:                   1. Same   2. Liveborn male infant      Procedure:                    Repeat low-transverse  section with double layer uterine closure via Pfannensteil incision and bilateral tubal ligation via [place] method     Anesthesia:   Spinal  QBL:                 364 mL  UOP:                150 mL clear urine at the end of the case  Drains:   Head catheter   Specimens:                 cord blood, cord segment, portion of both right and left fallopian tubes  Complications:       None apparent     Indications:   Trina Rooney is a 34 year old  at 39w0d admitted for repeat  delivery and bilateral tubal ligation.  The risks, benefits, and alternatives of  section were discussed with the patient, and she agreed to proceed.      Findings:   1. Moderate subcutaneous and rectofascial adhesions.  Omentum was adherent to anterior abdominal wall, right > left.  Otherwise, minimal intraabdominal adhesions.  2. Clear amniotic fluid  3. Liveborn male infant in vertex presentation. Apgars 7 at 1 minute & 8 at 5 minutes. Weight 3760g.  4. Arterial cord pH 7.22, base deficit 7.3. Venous cord pH 7.3, base deficit  4.2.  5. Normal uterus, fallopian tubes, and ovaries.      Procedure Details:   The patient was brought to the OR, where adequate spinal anesthesia was administered.  She was placed in the dorsal supine position with a slight leftward tilt. She was prepped and draped in the usual sterile fashion. Ancef 2g IV was administered for antibiotic prophylaxis. A surgical time out was performed.     A pfannenstiel skin incision was made with the scalpel, and carried down to the underlying fascia with sharp and blunt dissection. The fascia was incised in the midline, and the incision was extended laterally with the Celaya scissors. The superior aspect of the fascia was grasped with the Kocher clamps and dissected off of the underlying rectus muscles with blunt and sharp dissection. Attention was then turned to the inferior aspect of the fascia, which was similarly dissected off of the underlying rectus muscles. The rectus muscles were  in the midline, and the peritoneum was entered bluntly, and the opening was extended with digital pressure.  It was noted at this point that the omentum was adherent to anterior abdominal wall, especially on the right side. The omentum was dissected off the left side bluntly, and tucked away on the left side.  The bladder blade was placed.  A transverse hysterotomy was made with the scalpel in the lower uterine segment, and the incision was extended with digital pressure. The infant was noted to be in the vertex position, and was delivered atraumatically. The shoulders delivered easily.  No nuchal cord was noted. After 60s delay, the cord was doubly clamped and cut, and the infant was handed off to the awaiting nursery staff. A segment of cord was cut and set aside. The placenta was delivered with gentle traction on the umbilical cord and uterine massage. The uterus was exteriorized and cleared of all clots and debris. Uterine tone was noted to be firm with 20 units of pitocin given  through the running IV and uterine massage.  The hysterotomy was closed with a running locked suture of 0 Vicryl.  The hysterotomy was then imbricated using an 0 monocryl suture. The hysterotomy was noted to be hemostatic.  Attention was then turned to bilateral tubal ligation, which was completely bilaterally in the Wall method using 2-0 plain.  Telescoping ends of each fallopian tube were noted after ligation.  There was good hemostasis at each portion.  The posterior cul-de-sac was cleared of all clots and debris. The uterus was returned to the abdomen. The pericolic gutters were cleared of all clots and debris. The hysterotomy was reexamined and two figure of 8 stitches were placed over the hysterotomy to the right ov center for hemostasis.  It was then re-inspected and noted to be hemostatic. The fascia and rectus muscles were examined and areas of oozing were controlled with electrocautery.  The fascia was closed with a running 0 Vicryl suture. The subcutaneous tissue was irrigated and areas of oozing were controlled with electrocautery. The subcutaneous tissue was <2 cm in thickness, and was therefore not closed. The skin was closed with 4-0 monocryl in a subcuticular fashion and covered with steri-strips and a sterile dressing. All sponge, needle, and instrument counts were correct. The patient tolerated the procedure well, and was transferred to recovery in stable condition. I was scrubbed and present for entire procedure.    Nette Bui MD

## 2020-11-13 NOTE — PLAN OF CARE
VSS. Fundus Midline, firm, and U/1. Scant lochia. Incision approximated with steri-strips; no drainage noted. Pain adequately managed with tylenol and toradol. Ambulating independently, tolerating regular diet, and voiding without difficulty. Breastfeeding independently with good latch. Bonding well with . Continue with plan of care.

## 2020-11-13 NOTE — PROVIDER NOTIFICATION
11/13/20 0048   Provider Notification   Provider Name/Title Ramya   Method of Notification Electronic Page   Request Evaluate-Remote   Notification Reason Other  (Pt has home med betamethasone cream for peeling hands. Need )     Pt has home med betamethasone cream for peeling hands. Need orders for med so can get label made. TY

## 2020-11-13 NOTE — LACTATION NOTE
This note was copied from a baby's chart.  Consult for: RN request. Pt requested donor milk last evening, but denies concerns at this time.     Maternal Assessment: Trina has  her 2 other children. She has a history of vitamin d deficiency and latent TB but is otherwise generally healthy. She has been able to hand express colostrum and denies nipple pain or damage.     Infant Assessment: Infant has age appropriate output and has been cueing regularly to breastfeed. He was AGA at birth and is <24 hours old.     Feeding Assessment: Trina was independently able to position and latch infant at the breast. She denied pain and infant appeared to have a deep, asymmetric latch.     Education: breastfeeding frequency, the Second Night, expected  output and weight loss, benefits of skin to skin and hand expression, inpatient breastfeeding support and outpatient lactation resources.    Plan: Continue breastfeeding on cue with RN support as needed. Continue hand expression and skin to skin.

## 2020-11-13 NOTE — PROGRESS NOTES
[facility]   Brief Post-partum Note    Name:  Trina Rooney  MRN: [id number]    S: Feeling well this morning. Denies any pain. Had a small emesis yesterday, but feeling better today. Ambulating without dizziness. Required straight cath x1, but now voiding spontaneously. Lochia lighter than menstrual flow.  Breast feeding.      O:   Patient Vitals for the past 12 hrs:   BP Temp Temp src Pulse Resp SpO2   20 0100 100/68 98.2  F (36.8  C) Oral 72 17 100 %   20 103/61 97.9  F (36.6  C) Oral 68 18 100 %   20 1920 101/64 97.9  F (36.6  C) Oral 68 18 100 %     Gen:  Resting comfortably, NAD  CV:  Regular rate and rhythm   Pulm:  Non-labored breathing. No cough or wheezing.   Abd:  Soft, appropriately tender to palpation, non-distended.  Fundus firm below umbilicus, non-tender.  Incision: c/d/i with overlying bandage, no surrounding erythema or edema  Ext:  Non-tender, trace edema b/l    Hgb:   Recent Labs   Lab Test 20  1302   HGB 12.3       Assessment/Plan:  34 year old  on POD#1 s/p RLTCS + BTL.  Continue with routine postpartum management.     Pain: Well-controlled with sched tylenol, ibuprofen, PRN oxy  Hgb: AM Hgb pending. VSS as noted above, asymptomatic. PO iron upon discharge if Hgb <10.   GI:  BID Senna/Colace.  PRN Simethicone.   PPx:  Encouraged ambulation   Rh: Positive  Rubella: Equivocal, MMR ord   Feed: breastfeeding  BC: S/p tubal ligation   Dispo: Plan for home on POD#2.     Phyllis Morgan MD  Obstetrics and Gynecology, PGY-3  2020 6:50 AM      Attestation:   This patient was seen and evaluated by me, separately from the house staff team. I have reviewed the note/plan above and agree.     Doing well today. Discussed routine cares and discontinue IV later today. Plan discharge home POD #2-3  Hemoglobin   Date Value Ref Range Status   2020 11.4 (L) 11.7 - 15.7 g/dL Final   ]      Nisha Toledo MD

## 2020-11-14 VITALS
WEIGHT: 186 LBS | HEIGHT: 66 IN | DIASTOLIC BLOOD PRESSURE: 68 MMHG | OXYGEN SATURATION: 100 % | HEART RATE: 63 BPM | BODY MASS INDEX: 29.89 KG/M2 | RESPIRATION RATE: 16 BRPM | SYSTOLIC BLOOD PRESSURE: 102 MMHG | TEMPERATURE: 98.2 F

## 2020-11-14 PROCEDURE — 250N000013 HC RX MED GY IP 250 OP 250 PS 637: Performed by: STUDENT IN AN ORGANIZED HEALTH CARE EDUCATION/TRAINING PROGRAM

## 2020-11-14 PROCEDURE — 250N000011 HC RX IP 250 OP 636: Performed by: STUDENT IN AN ORGANIZED HEALTH CARE EDUCATION/TRAINING PROGRAM

## 2020-11-14 PROCEDURE — 90707 MMR VACCINE SC: CPT | Performed by: STUDENT IN AN ORGANIZED HEALTH CARE EDUCATION/TRAINING PROGRAM

## 2020-11-14 PROCEDURE — 90471 IMMUNIZATION ADMIN: CPT | Performed by: STUDENT IN AN ORGANIZED HEALTH CARE EDUCATION/TRAINING PROGRAM

## 2020-11-14 RX ORDER — OXYCODONE HYDROCHLORIDE 5 MG/1
5 TABLET ORAL EVERY 6 HOURS PRN
Qty: 3 TABLET | Refills: 0 | Status: SHIPPED | OUTPATIENT
Start: 2020-11-14 | End: 2020-12-31

## 2020-11-14 RX ADMIN — ACETAMINOPHEN 975 MG: 325 TABLET, FILM COATED ORAL at 10:16

## 2020-11-14 RX ADMIN — ACETAMINOPHEN 975 MG: 325 TABLET, FILM COATED ORAL at 04:07

## 2020-11-14 RX ADMIN — MEASLES, MUMPS, AND RUBELLA VIRUS VACCINE LIVE 0.5 ML: 1000; 12500; 1000 INJECTION, POWDER, LYOPHILIZED, FOR SUSPENSION SUBCUTANEOUS at 08:10

## 2020-11-14 RX ADMIN — IBUPROFEN 800 MG: 800 TABLET ORAL at 10:16

## 2020-11-14 RX ADMIN — BISACODYL 10 MG: 10 SUPPOSITORY RECTAL at 10:32

## 2020-11-14 RX ADMIN — DOCUSATE SODIUM AND SENNOSIDES 2 TABLET: 8.6; 5 TABLET ORAL at 08:09

## 2020-11-14 RX ADMIN — IBUPROFEN 800 MG: 800 TABLET ORAL at 04:07

## 2020-11-14 NOTE — PLAN OF CARE
Postpartum assessment WDL, VSS. Pt is eating, drinking, voiding and ambulating in room. Taking ibuprofen and tylenol for adequate pain relief. Breastfeeding independently. Continue with plan of care.

## 2020-11-14 NOTE — PROGRESS NOTES
[facility]   Post-partum Note    Name:  Trina Rooney  MRN: [id number]    S: Doing very well this morning. Pain is minimal and well controlled with PO medications. Has been up and ambulating without dizziness. Tolerating food and water without nausea or vomiting. Required straight cath once yesterday but has been able to void since without difficulty. Passing gas but no BM yet. Breastfeeeding baby and will plan to supplement with formula as needed. Hoping to have baby circumcised before going home.     O:   Patient Vitals for the past 24 hrs:   BP Temp Temp src Pulse Resp   20 0758 102/68 98.2  F (36.8  C) Oral 63 16   20 2345 104/68 98.2  F (36.8  C) Oral 70 16   20 1845 107/74 97.9  F (36.6  C) Oral 76 18   ]  Gen:  Resting comfortably, NAD  CV:  Regular rate and rhythm   Pulm:  Non-labored breathing. No cough or wheezing.   Abd:  Soft, appropriately tender to palpation, non-distended.  Fundus firm below umbilicus, non-tender.  Incision: steri-strips in place, some minimal old bleeding. Otherwise appears well healing without drainage.   Ext:  Non-tender, trace edema b/l    Hgb:11.4     Assessment/Plan:  34 year old  on POD#2 s/p RLTCS + BTL.  Continue with routine postpartum management.     Pain: Well-controlled with sched tylenol, ibuprofen, PRN oxy  Hgb: Hgb 12.3 >  > 11.4. VSS as noted above, asymptomatic.   GI:  BID Senna/Colace.  PRN Simethicone.   PPx:  Encouraged ambulation   Rh: Positive  Rubella: Equivocal, MMR ord   Feed: Breastfeeding, will supplement with formula prn   BC: S/p tubal ligation   Dispo: Plan for discharge to home today    Adalgisa Freed MD  OBGYN PGY-1  9:52 AM 2020      Physician Attestation   I, Lola Cabrera MD, personally examined and evaluated this patient.  I discussed the patient with the resident/fellow and care team, and agree with the assessment and plan of care as documented in the note of 2020.      I  personally reviewed vital signs, medications and labs.    Parker findings: Trina Rooney is a 34 year old  POD#2 s/p RLTCS and BTL, currently recovering well from surgery and meeting goals for discharge to home.  Patient reports that she did have a bowel movement.  Pain is well controlled on tylenol and ibuprofen.  Discussed discharge home with a couple of tablets of 5mg oxycodone to be used for breakthrough pain.  The patient states that after her last delivery she did require a few tablets at home.  Plan to discharge with 3 tablets of 5mg oxycodone.  Patient to discharge to home today.    Lola Cabrera MD  Date of Service (when I saw the patient): 20

## 2020-11-14 NOTE — PLAN OF CARE
Data: Vital signs within normal limits. Postpartum checks within normal limits - see flow record. Patient eating and drinking normally. Patient able to empty bladder independently and is up ambulating. No apparent signs of infection. Incision healing well. Patient performing self cares and is able to care for infant. Breastfeeding independently.   Action: Patient medicated during the shift for pain and cramping. See MAR. Patient reassessed within 1 hour after each medication and pain was improved - patient stated she was comfortable. Patient education done about self cares and encourage rest. See flow record.  Response: Positive attachment behaviors observed with infant. Pt alone in room.   Plan: Anticipate discharge 11/14.

## 2020-11-17 LAB — COPATH REPORT: NORMAL

## 2020-11-25 ENCOUNTER — TELEPHONE (OUTPATIENT)
Dept: OBGYN | Facility: CLINIC | Age: 35
End: 2020-11-25

## 2020-11-25 NOTE — TELEPHONE ENCOUNTER
Called Trina using [facility] , to follow up on breastfeeding.  She reports that all is going well, although she has some nipple pain.  Denies any cracking or bleeding.  Discussed helping baby to open mouth widely, and keep baby close to body to help him grasp a large portion of the breast.  She states that as this is her third baby, she feels confident in knowing what to do.  Left phone number of clinic if any further breastfeeding assistance is needed, and that this writer is present for breastfeeding help every Wednesday.

## 2020-12-31 ENCOUNTER — PRENATAL OFFICE VISIT (OUTPATIENT)
Dept: OBGYN | Facility: CLINIC | Age: 35
End: 2020-12-31
Payer: COMMERCIAL

## 2020-12-31 VITALS
WEIGHT: 178.6 LBS | DIASTOLIC BLOOD PRESSURE: 68 MMHG | BODY MASS INDEX: 28.7 KG/M2 | TEMPERATURE: 98.2 F | HEIGHT: 66 IN | SYSTOLIC BLOOD PRESSURE: 103 MMHG | HEART RATE: 69 BPM

## 2020-12-31 PROCEDURE — 99207 PR POST PARTUM EXAM: CPT | Performed by: OBSTETRICS & GYNECOLOGY

## 2020-12-31 ASSESSMENT — ANXIETY QUESTIONNAIRES

## 2020-12-31 ASSESSMENT — PATIENT HEALTH QUESTIONNAIRE - PHQ9
SUM OF ALL RESPONSES TO PHQ QUESTIONS 1-9: 1
5. POOR APPETITE OR OVEREATING: NOT AT ALL

## 2020-12-31 ASSESSMENT — MIFFLIN-ST. JEOR: SCORE: 1526.87

## 2020-12-31 NOTE — PROGRESS NOTES
"Trina is here for a 6-week postpartum checkup.    She had a repeat c/s of a viable boy, weight 8 pounds 1 oz., with no complications.  Since delivery, she has been breast feeding and supplementing.  She has no signs of infection, bleeding or other complications.  She is not pregnant.  We discussed contraception and she has chosen none.    Mood is good.  Today's Depression Rating was No flowsheet data found.     Has some right breast pain with breastfeeding. Otherwise it's going well. Not severe. No pain when not breastfeeding.  Feels like that breast gets more engorged. Less pain if she gives him that side first. Also sometimes pumps and bottle feeds from that side.     EXAM:  Vitals:    12/31/20 1109   BP: 103/68   BP Location: Right arm   Patient Position: Sitting   Cuff Size: Adult Large   Pulse: 69   Temp: 98.2  F (36.8  C)   TempSrc: Oral   Weight: 81 kg (178 lb 9.6 oz)   Height: 1.676 m (5' 6\")     HEENT: grossly normal.  NECK: no lymphadenopathy or thyroidomegaly.  LUNGS: CTA X 2, no rales or crackles.  BREASTS: symmetric, no masses or discharge  BACK: No spinal or CVA tenderness.  HEART: RRR without murmurs clicks or gallops.  ABDOMEN: soft, non tender, good bowel sounds, without masses rebound, guarding or tenderness.  Pfannenstiel incision well healed    PELVIC:    External genitalia: normal without lesion.                            Vagina: normal mucosa and rugae, no discharge.  Cervix: multiparous, well healed, without lesion.  Uterus: non pregnant in size, firm , mobile, no lesions.  Adnexa: non tender, without masses    EXTREMITIES: Warm to touch, good pulses, no ankle edema or calf tenderness.  NEUROLOGIC: grossly normal.    ASSESSMENT:   Normal 6-week postpartum exam after repeat c/s.    PLAN:  Pap smear due 2025 and tubal ligation done at [facility] for contraception.  No warning signs associated with breast pain. Offered lactation support if she needs it. For now she wants to wait.    "

## 2021-01-01 ASSESSMENT — ANXIETY QUESTIONNAIRES: GAD7 TOTAL SCORE: 0

## 2021-03-08 ENCOUNTER — APPOINTMENT (OUTPATIENT)
Dept: CT IMAGING | Facility: CLINIC | Age: 36
End: 2021-03-08
Attending: EMERGENCY MEDICINE
Payer: COMMERCIAL

## 2021-03-08 ENCOUNTER — HOSPITAL ENCOUNTER (EMERGENCY)
Facility: CLINIC | Age: 36
Discharge: HOME | End: 2021-03-08
Attending: EMERGENCY MEDICINE | Admitting: EMERGENCY MEDICINE
Payer: COMMERCIAL

## 2021-03-08 VITALS
SYSTOLIC BLOOD PRESSURE: 122 MMHG | DIASTOLIC BLOOD PRESSURE: 82 MMHG | OXYGEN SATURATION: 100 % | BODY MASS INDEX: 29.21 KG/M2 | WEIGHT: 181 LBS | RESPIRATION RATE: 16 BRPM | HEART RATE: 67 BPM | TEMPERATURE: 98 F

## 2021-03-08 DIAGNOSIS — J02.9 PHARYNGITIS, UNSPECIFIED ETIOLOGY: ICD-10-CM

## 2021-03-08 LAB
ANION GAP SERPL CALCULATED.3IONS-SCNC: 6 MMOL/L (ref 3–14)
BASOPHILS # BLD AUTO: 0 10E9/L (ref 0–0.2)
BASOPHILS NFR BLD AUTO: 0.8 %
BUN SERPL-MCNC: 13 MG/DL (ref 7–30)
CALCIUM SERPL-MCNC: 9 MG/DL (ref 8.5–10.1)
CHLORIDE SERPL-SCNC: 107 MMOL/L (ref 94–109)
CO2 SERPL-SCNC: 27 MMOL/L (ref 20–32)
CREAT SERPL-MCNC: 0.54 MG/DL (ref 0.52–1.04)
DEPRECATED S PYO AG THROAT QL EIA: NEGATIVE
DIFFERENTIAL METHOD BLD: ABNORMAL
EOSINOPHIL # BLD AUTO: 0 10E9/L (ref 0–0.7)
EOSINOPHIL NFR BLD AUTO: 0 %
ERYTHROCYTE [DISTWIDTH] IN BLOOD BY AUTOMATED COUNT: 13.2 % (ref 10–15)
GFR SERPL CREATININE-BSD FRML MDRD: >90 ML/MIN/{1.73_M2}
GLUCOSE SERPL-MCNC: 108 MG/DL (ref 70–99)
HCG SERPL QL: NEGATIVE
HCT VFR BLD AUTO: 41.2 % (ref 35–47)
HGB BLD-MCNC: 13.6 G/DL (ref 11.7–15.7)
IMM GRANULOCYTES # BLD: 0 10E9/L (ref 0–0.4)
IMM GRANULOCYTES NFR BLD: 0 %
LYMPHOCYTES # BLD AUTO: 1.5 10E9/L (ref 0.8–5.3)
LYMPHOCYTES NFR BLD AUTO: 40.8 %
MCH RBC QN AUTO: 29.1 PG (ref 26.5–33)
MCHC RBC AUTO-ENTMCNC: 33 G/DL (ref 31.5–36.5)
MCV RBC AUTO: 88 FL (ref 78–100)
MONOCYTES # BLD AUTO: 0.5 10E9/L (ref 0–1.3)
MONOCYTES NFR BLD AUTO: 13.1 %
NEUTROPHILS # BLD AUTO: 1.6 10E9/L (ref 1.6–8.3)
NEUTROPHILS NFR BLD AUTO: 45.3 %
NRBC # BLD AUTO: 0 10*3/UL
NRBC BLD AUTO-RTO: 0 /100
PLATELET # BLD AUTO: 193 10E9/L (ref 150–450)
POTASSIUM SERPL-SCNC: 3.8 MMOL/L (ref 3.4–5.3)
RBC # BLD AUTO: 4.67 10E12/L (ref 3.8–5.2)
SODIUM SERPL-SCNC: 140 MMOL/L (ref 133–144)
SPECIMEN SOURCE: NORMAL
SPECIMEN SOURCE: NORMAL
STREP GROUP A PCR: NOT DETECTED
TSH SERPL DL<=0.005 MIU/L-ACNC: 1.45 MU/L (ref 0.4–4)
WBC # BLD AUTO: 3.6 10E9/L (ref 4–11)

## 2021-03-08 PROCEDURE — 84703 CHORIONIC GONADOTROPIN ASSAY: CPT | Performed by: EMERGENCY MEDICINE

## 2021-03-08 PROCEDURE — 99284 EMERGENCY DEPT VISIT MOD MDM: CPT | Performed by: EMERGENCY MEDICINE

## 2021-03-08 PROCEDURE — 99285 EMERGENCY DEPT VISIT HI MDM: CPT | Mod: 25

## 2021-03-08 PROCEDURE — 87651 STREP A DNA AMP PROBE: CPT | Performed by: EMERGENCY MEDICINE

## 2021-03-08 PROCEDURE — 85025 COMPLETE CBC W/AUTO DIFF WBC: CPT | Performed by: EMERGENCY MEDICINE

## 2021-03-08 PROCEDURE — 80048 BASIC METABOLIC PNL TOTAL CA: CPT | Performed by: EMERGENCY MEDICINE

## 2021-03-08 PROCEDURE — 70491 CT SOFT TISSUE NECK W/DYE: CPT

## 2021-03-08 PROCEDURE — 96360 HYDRATION IV INFUSION INIT: CPT | Mod: 59

## 2021-03-08 PROCEDURE — 84443 ASSAY THYROID STIM HORMONE: CPT | Performed by: EMERGENCY MEDICINE

## 2021-03-08 PROCEDURE — 250N000011 HC RX IP 250 OP 636: Performed by: EMERGENCY MEDICINE

## 2021-03-08 PROCEDURE — 258N000003 HC RX IP 258 OP 636: Performed by: EMERGENCY MEDICINE

## 2021-03-08 PROCEDURE — 999N001174 HC STATISTIC STREP A RAPID: Performed by: EMERGENCY MEDICINE

## 2021-03-08 RX ORDER — IOPAMIDOL 755 MG/ML
100 INJECTION, SOLUTION INTRAVASCULAR ONCE
Status: COMPLETED | OUTPATIENT
Start: 2021-03-08 | End: 2021-03-08

## 2021-03-08 RX ORDER — SODIUM CHLORIDE 9 MG/ML
100 INJECTION, SOLUTION INTRAVENOUS ONCE
Status: COMPLETED | OUTPATIENT
Start: 2021-03-08 | End: 2021-03-08

## 2021-03-08 RX ADMIN — IOPAMIDOL 80 ML: 755 INJECTION, SOLUTION INTRAVENOUS at 21:09

## 2021-03-08 RX ADMIN — SODIUM CHLORIDE 50 ML: 9 INJECTION, SOLUTION INTRAVENOUS at 21:08

## 2021-03-08 ASSESSMENT — ENCOUNTER SYMPTOMS
SHORTNESS OF BREATH: 1
SORE THROAT: 0
VOICE CHANGE: 1
NECK PAIN: 0
FEVER: 0

## 2021-03-09 NOTE — ED PROVIDER NOTES
"    [facility] EMERGENCY DEPARTMENT ([facility])  3/08/21  History     Chief Complaint   Patient presents with     Pharyngitis     for the apst 5 days \" I feel this swelling inside my throat, it seems like it is tonsilitis\" Denies difficulty swallowing, not SOB,      The history is provided by the patient and medical records.     Trina Rooney is a 35 year old female with history of latent tuberculosis who presents to the ED for evaluation of throat swelling.  Patient notes a slow onset of sensation of throat swelling over the past 5 days.  She likens it to tonsillitis in which she has had in the past.  The patient denies associated pain.  She notes the swelling feels like it is worse when her neck is positioned forward but improves while her neck is positioned toward the back.  Denies significant shortness of breath, voice change or difficulty swallowing.  Attempted a water with salt rinse without relief.  Patient was diagnosed with COVID-19 approximately 4 months ago.  She denies fever, sore throat, jaw pain, ear pain, ear drainage or neck stiffness.  Patient denies the use of illicit drugs.  She denies a history of risky sexual behavior.  She does report having a baby approximately 4 months ago.  Since that time she has continued to gain weight and has been feeling fatigued.    This part of the document was transcribed by Derrick Cole Scribjennie.    Past Medical History:   Diagnosis Date     H. pylori infection 2013     History of positive PPD      Latent tuberculosis      Placenta abruptio        Past Surgical History:   Procedure Laterality Date     C/SECTION, LOW TRANSVERSE N/A 2014    , Low Transverse x2      SECTION N/A 2019    Procedure:  SECTION;  Surgeon: Adalgisa Gaona MD;  Location: [facility] L+[facility]      SECTION, TUBAL LIGATION, COMBINED Bilateral 2020    Procedure:  SECTION, WITH POSTPARTUM TUBAL LIGATION;  Surgeon: " Nette Bui MD;  Location:  L+D       Family History   Problem Relation Age of Onset     Family History Negative Other        Social History     Tobacco Use     Smoking status: Never Smoker     Smokeless tobacco: Never Used   Substance Use Topics     Alcohol use: No     No current facility-administered medications for this encounter.      Current Outpatient Medications   Medication     Prenatal Vit-Fe Fumarate-FA (PRENATAL MULTIVITAMIN W/IRON) 27-0.8 MG tablet     vitamin D3 (CHOLECALCIFEROL) 2000 units (50 mcg) tablet      No Known Allergies      Review of Systems   Constitutional: Negative for fever.   HENT: Positive for voice change. Negative for ear discharge, ear pain and sore throat.         Positive for throat swelling   Respiratory: Positive for shortness of breath.    Musculoskeletal: Negative for neck pain.   All other systems reviewed and are negative.    A complete review of systems was performed with pertinent positives and negatives noted in the HPI, and all other systems negative.    Physical Exam   BP: 117/69  Pulse: 65  Temp: 98  F (36.7  C)  Resp: 14  Weight: 82.1 kg (181 lb)  SpO2: 100 %  Physical Exam  General: patient is alert and oriented and in no acute distress   Head: atraumatic and normocephalic   EENT: moist mucus membranes without tonsillar erythema or exudates, uvula is midline, no peritonsillar swelling, no trismus, TMs are pearly gray bilaterally without erythema or effusion pupils round and reactive   Neck: supple with full range of motion, no meningismus, swelling noted overlying the right lobe of the thyroid, no cervical lymphadenopathy  Cardiovascular: regular rate and rhythm, extremities warm and well perfused, no lower extremity edema  Pulmonary: lungs clear to auscultation bilaterally   Abdomen: soft, non-tender   Musculoskeletal: normal range of motion   Neurological: alert and oriented, moving all extremities symmetrically, gait normal   Skin: warm, dry     ED  Course      Procedures                         No results found for any visits on 03/08/21.  Medications - No data to display     Assessments & Plan (with Medical Decision Making)   Ms. Rooney is a 35 year old female with history of latent tuberculosis who presents to the ED for evaluation of throat swelling.  She is hemodynamically stable and afebrile.  She is in no respiratory distress speaking full sentences and tolerating her secretions without difficulty.  Strep swab was obtained and is negative.  Given her recent pregnancy and potential thyroid swelling I have obtained baseline labs.  I have reviewed these which are notable for mild leukopenia with a white count of 3.6, normal TSH, negative strep test.   She did go for a CT of the neck which shows no thyroid or neck masses.  Her airway is patent.  History and exam are not consistent with epiglottitis.  He has no evidence of peritonsillar or retropharyngeal abscess on imaging.  At this point we will plan to treat conservatively for pharyngitis with throat lozenges.  She was given close return precautions for the emergency department and voiced understanding.    I have reviewed the nursing notes. I have reviewed the findings, diagnosis, plan and need for follow up with the patient.    New Prescriptions    No medications on file       Final diagnoses:   Pharyngitis, unspecified etiology       --  Lela Garcias MD  [facility] EMERGENCY DEPARTMENT  3/8/2021     Lela Garcias MD  03/08/21 3819

## 2021-03-09 NOTE — DISCHARGE INSTRUCTIONS
Please make an appointment to follow up with Your Primary Care Provider and Primary Care Center (phone: [phone]) in 5-7 days unless symptoms completely resolve.    You may gargle with salt water or use over the counter throat lozenges for discomfort.      If you have worsening symptoms, difficulty swallowing, fever, shortness of breath or other concerns, return to the emergency department for re-evaluation.

## 2021-10-26 ENCOUNTER — HOSPITAL ENCOUNTER (EMERGENCY)
Facility: CLINIC | Age: 36
Discharge: HOME | End: 2021-10-26
Attending: EMERGENCY MEDICINE | Admitting: EMERGENCY MEDICINE
Payer: COMMERCIAL

## 2021-10-26 VITALS
OXYGEN SATURATION: 100 % | HEART RATE: 73 BPM | DIASTOLIC BLOOD PRESSURE: 89 MMHG | SYSTOLIC BLOOD PRESSURE: 102 MMHG | RESPIRATION RATE: 16 BRPM | TEMPERATURE: 98.6 F | WEIGHT: 170.4 LBS | BODY MASS INDEX: 27.5 KG/M2

## 2021-10-26 DIAGNOSIS — K29.00 ACUTE GASTRITIS WITHOUT HEMORRHAGE, UNSPECIFIED GASTRITIS TYPE: Primary | ICD-10-CM

## 2021-10-26 DIAGNOSIS — R10.13 ABDOMINAL PAIN, EPIGASTRIC: ICD-10-CM

## 2021-10-26 LAB
ALBUMIN SERPL-MCNC: 3.6 G/DL (ref 3.4–5)
ALBUMIN UR-MCNC: NEGATIVE MG/DL
ALP SERPL-CCNC: 80 U/L (ref 40–150)
ALT SERPL W P-5'-P-CCNC: 26 U/L (ref 0–50)
ANION GAP SERPL CALCULATED.3IONS-SCNC: 5 MMOL/L (ref 3–14)
APPEARANCE UR: CLEAR
AST SERPL W P-5'-P-CCNC: 16 U/L (ref 0–45)
ATRIAL RATE - MUSE: 63 BPM
BASOPHILS # BLD AUTO: 0 10E3/UL (ref 0–0.2)
BASOPHILS NFR BLD AUTO: 0 %
BILIRUB SERPL-MCNC: 0.4 MG/DL (ref 0.2–1.3)
BILIRUB UR QL STRIP: NEGATIVE
BUN SERPL-MCNC: 10 MG/DL (ref 7–30)
CALCIUM SERPL-MCNC: 8.8 MG/DL (ref 8.5–10.1)
CHLORIDE BLD-SCNC: 109 MMOL/L (ref 94–109)
CO2 SERPL-SCNC: 26 MMOL/L (ref 20–32)
COLOR UR AUTO: ABNORMAL
CREAT SERPL-MCNC: 0.56 MG/DL (ref 0.52–1.04)
DIASTOLIC BLOOD PRESSURE - MUSE: NORMAL MMHG
EOSINOPHIL # BLD AUTO: 0 10E3/UL (ref 0–0.7)
EOSINOPHIL NFR BLD AUTO: 0 %
ERYTHROCYTE [DISTWIDTH] IN BLOOD BY AUTOMATED COUNT: 13.2 % (ref 10–15)
GFR SERPL CREATININE-BSD FRML MDRD: >90 ML/MIN/1.73M2
GLUCOSE BLD-MCNC: 106 MG/DL (ref 70–99)
GLUCOSE UR STRIP-MCNC: NEGATIVE MG/DL
HCG UR QL: NEGATIVE
HCT VFR BLD AUTO: 41.2 % (ref 35–47)
HGB BLD-MCNC: 13.5 G/DL (ref 11.7–15.7)
HGB UR QL STRIP: NEGATIVE
IMM GRANULOCYTES # BLD: 0 10E3/UL
IMM GRANULOCYTES NFR BLD: 0 %
INTERPRETATION ECG - MUSE: NORMAL
KETONES UR STRIP-MCNC: NEGATIVE MG/DL
LEUKOCYTE ESTERASE UR QL STRIP: NEGATIVE
LIPASE SERPL-CCNC: 101 U/L (ref 73–393)
LYMPHOCYTES # BLD AUTO: 1.1 10E3/UL (ref 0.8–5.3)
LYMPHOCYTES NFR BLD AUTO: 21 %
MCH RBC QN AUTO: 29.3 PG (ref 26.5–33)
MCHC RBC AUTO-ENTMCNC: 32.8 G/DL (ref 31.5–36.5)
MCV RBC AUTO: 89 FL (ref 78–100)
MONOCYTES # BLD AUTO: 0.5 10E3/UL (ref 0–1.3)
MONOCYTES NFR BLD AUTO: 10 %
MUCOUS THREADS #/AREA URNS LPF: PRESENT /LPF
NEUTROPHILS # BLD AUTO: 3.6 10E3/UL (ref 1.6–8.3)
NEUTROPHILS NFR BLD AUTO: 69 %
NITRATE UR QL: NEGATIVE
NRBC # BLD AUTO: 0 10E3/UL
NRBC BLD AUTO-RTO: 0 /100
P AXIS - MUSE: 61 DEGREES
PH UR STRIP: 7 [PH] (ref 5–7)
PLATELET # BLD AUTO: 166 10E3/UL (ref 150–450)
POTASSIUM BLD-SCNC: 3.3 MMOL/L (ref 3.4–5.3)
PR INTERVAL - MUSE: 150 MS
PROT SERPL-MCNC: 7.7 G/DL (ref 6.8–8.8)
QRS DURATION - MUSE: 82 MS
QT - MUSE: 408 MS
QTC - MUSE: 417 MS
R AXIS - MUSE: 52 DEGREES
RBC # BLD AUTO: 4.61 10E6/UL (ref 3.8–5.2)
RBC URINE: 1 /HPF
SODIUM SERPL-SCNC: 140 MMOL/L (ref 133–144)
SP GR UR STRIP: 1.02 (ref 1–1.03)
SYSTOLIC BLOOD PRESSURE - MUSE: NORMAL MMHG
T AXIS - MUSE: 38 DEGREES
UROBILINOGEN UR STRIP-MCNC: NORMAL MG/DL
VENTRICULAR RATE- MUSE: 63 BPM
WBC # BLD AUTO: 5.2 10E3/UL (ref 4–11)
WBC URINE: <1 /HPF

## 2021-10-26 PROCEDURE — 99284 EMERGENCY DEPT VISIT MOD MDM: CPT | Performed by: EMERGENCY MEDICINE

## 2021-10-26 PROCEDURE — 250N000009 HC RX 250: Performed by: STUDENT IN AN ORGANIZED HEALTH CARE EDUCATION/TRAINING PROGRAM

## 2021-10-26 PROCEDURE — 250N000013 HC RX MED GY IP 250 OP 250 PS 637: Performed by: EMERGENCY MEDICINE

## 2021-10-26 PROCEDURE — 83690 ASSAY OF LIPASE: CPT | Performed by: STUDENT IN AN ORGANIZED HEALTH CARE EDUCATION/TRAINING PROGRAM

## 2021-10-26 PROCEDURE — 36415 COLL VENOUS BLD VENIPUNCTURE: CPT | Performed by: STUDENT IN AN ORGANIZED HEALTH CARE EDUCATION/TRAINING PROGRAM

## 2021-10-26 PROCEDURE — 99284 EMERGENCY DEPT VISIT MOD MDM: CPT | Mod: 25 | Performed by: EMERGENCY MEDICINE

## 2021-10-26 PROCEDURE — 80053 COMPREHEN METABOLIC PANEL: CPT | Performed by: STUDENT IN AN ORGANIZED HEALTH CARE EDUCATION/TRAINING PROGRAM

## 2021-10-26 PROCEDURE — 250N000011 HC RX IP 250 OP 636: Performed by: STUDENT IN AN ORGANIZED HEALTH CARE EDUCATION/TRAINING PROGRAM

## 2021-10-26 PROCEDURE — 250N000013 HC RX MED GY IP 250 OP 250 PS 637: Performed by: STUDENT IN AN ORGANIZED HEALTH CARE EDUCATION/TRAINING PROGRAM

## 2021-10-26 PROCEDURE — 93005 ELECTROCARDIOGRAM TRACING: CPT | Performed by: EMERGENCY MEDICINE

## 2021-10-26 PROCEDURE — 81001 URINALYSIS AUTO W/SCOPE: CPT | Performed by: STUDENT IN AN ORGANIZED HEALTH CARE EDUCATION/TRAINING PROGRAM

## 2021-10-26 PROCEDURE — 81025 URINE PREGNANCY TEST: CPT | Performed by: EMERGENCY MEDICINE

## 2021-10-26 PROCEDURE — 85025 COMPLETE CBC W/AUTO DIFF WBC: CPT | Performed by: STUDENT IN AN ORGANIZED HEALTH CARE EDUCATION/TRAINING PROGRAM

## 2021-10-26 PROCEDURE — 93010 ELECTROCARDIOGRAM REPORT: CPT | Performed by: EMERGENCY MEDICINE

## 2021-10-26 RX ORDER — IBUPROFEN 600 MG/1
600 TABLET, FILM COATED ORAL ONCE
Status: COMPLETED | OUTPATIENT
Start: 2021-10-26 | End: 2021-10-26

## 2021-10-26 RX ORDER — ONDANSETRON 4 MG/1
4 TABLET, ORALLY DISINTEGRATING ORAL EVERY 8 HOURS PRN
Qty: 30 TABLET | Refills: 0 | Status: SHIPPED | OUTPATIENT
Start: 2021-10-26 | End: 2021-10-26

## 2021-10-26 RX ORDER — ACETAMINOPHEN 325 MG/1
650 TABLET ORAL ONCE
Status: COMPLETED | OUTPATIENT
Start: 2021-10-26 | End: 2021-10-26

## 2021-10-26 RX ORDER — ONDANSETRON 4 MG/1
4 TABLET, ORALLY DISINTEGRATING ORAL EVERY 8 HOURS PRN
Qty: 30 TABLET | Refills: 0 | Status: SHIPPED | OUTPATIENT
Start: 2021-10-26

## 2021-10-26 RX ORDER — ONDANSETRON 4 MG/1
4 TABLET, ORALLY DISINTEGRATING ORAL ONCE
Status: COMPLETED | OUTPATIENT
Start: 2021-10-26 | End: 2021-10-26

## 2021-10-26 RX ORDER — POTASSIUM CHLORIDE 1.5 G/1.58G
20 POWDER, FOR SOLUTION ORAL ONCE
Status: COMPLETED | OUTPATIENT
Start: 2021-10-26 | End: 2021-10-26

## 2021-10-26 RX ADMIN — ACETAMINOPHEN 650 MG: 325 TABLET, FILM COATED ORAL at 17:03

## 2021-10-26 RX ADMIN — IBUPROFEN 600 MG: 600 TABLET ORAL at 17:03

## 2021-10-26 RX ADMIN — LIDOCAINE HYDROCHLORIDE 30 ML: 20 SOLUTION ORAL; TOPICAL at 17:03

## 2021-10-26 RX ADMIN — ONDANSETRON 4 MG: 4 TABLET, ORALLY DISINTEGRATING ORAL at 17:03

## 2021-10-26 RX ADMIN — POTASSIUM CHLORIDE 20 MEQ: 1.5 FOR SOLUTION ORAL at 18:27

## 2021-10-26 NOTE — ED PROVIDER NOTES
"ED Provider Note  [facility]      History     Chief Complaint   Patient presents with     Nausea & Vomiting     last night vomited \" my food I vomited it all last night\" x 2, + epigastruc pain, just nausea today. Hx of H Pylori, no changes in bowel and bladder.     HPI  Mergitu KAMRAN Rooney is a 35 year old female w/ a PMHx of BCG vaccine,  x 2 (low transverse), GERD, and H. Pylori presents w/ a day hx prior to admit of nausea and emesis x2.    Around 2am last night patient woke up w/ epigastric pain described as \"cramping and cutting, 7/10\" w/ associated emesis x2 described as NB/Nb and \"was the food I ate at dinner\". Since then she has had no emesis, just nausea. Her pain has stayed consistent since that time w/o radiation. She has not tied anything to relieve her pain. She reports no worsening factors for her pain. She denies any recent illness / sick contacts, and denies recent fever / nightsweats / chills. She states a week ago she was constipated w/p blood or strain, but this subsided. No diarrhea. Denies dysuria / hematuria. She has been able to tolerate liquid PO since last night, but has not tried solid PO.     She states a week ago her  was diagnosed w/ active H. Pylori, and she herself was treated for H. Pylori in .    Past Medical History  Past Medical History:   Diagnosis Date     H. pylori infection 2013     History of positive PPD      Latent tuberculosis      Placenta abruptio      Past Surgical History:   Procedure Laterality Date     C/SECTION, LOW TRANSVERSE N/A 2014    , Low Transverse x2      SECTION N/A 2019    Procedure:  SECTION;  Surgeon: Adalgisa Gaona MD;  Location: [facility] L[facility]      SECTION, TUBAL LIGATION, COMBINED Bilateral 2020    Procedure:  SECTION, WITH POSTPARTUM TUBAL LIGATION;  Surgeon: Nette Bui MD;  Location: [facility] L[facility]     omeprazole (PRILOSEC) 20 MG DR" capsule  ondansetron (ZOFRAN ODT) 4 MG ODT tab  Prenatal Vit-Fe Fumarate-FA (PRENATAL MULTIVITAMIN W/IRON) 27-0.8 MG tablet  vitamin D3 (CHOLECALCIFEROL) 2000 units (50 mcg) tablet      No Known Allergies  Family History  Family History   Problem Relation Age of Onset     Family History Negative Other      Social History   Social History     Tobacco Use     Smoking status: Never Smoker     Smokeless tobacco: Never Used   Substance Use Topics     Alcohol use: No     Drug use: No      Past medical history, past surgical history, medications, allergies, family history, and social history were reviewed with the patient. No additional pertinent items.       Review of Systems  A complete review of systems was performed with pertinent positives and negatives noted in the HPI, and all other systems negative.    Physical Exam   BP: 137/81  Pulse: 81  Temp: 99.1  F (37.3  C)  Resp: 18  Weight: 77.3 kg (170 lb 6.4 oz)  SpO2: 100 %  Physical Exam  Constitutional:       General: She is not in acute distress.     Appearance: Normal appearance. She is normal weight. She is not ill-appearing or toxic-appearing.   Cardiovascular:      Rate and Rhythm: Normal rate and regular rhythm.      Pulses: Normal pulses.      Heart sounds: Normal heart sounds. No murmur heard.   No gallop.    Pulmonary:      Effort: Pulmonary effort is normal. No respiratory distress.      Breath sounds: Normal breath sounds.   Abdominal:      General: Abdomen is flat. Bowel sounds are normal.      Palpations: Abdomen is soft.      Tenderness: There is abdominal tenderness (Tenderess on deep palpatiion w/o guard, rebound, peritonitic signs. Patient able to speak fully to examiner during deep abdominal palpation. ) in the epigastric area.          Comments: Epigastric tenderness. Scar from low transverse .    Neurological:      Mental Status: She is alert.       ED Course      Procedures            EKG Interpretation:      Interpreted by Karan  DO Immanuel  Time reviewed: 1648  Symptoms at time of EKG: Mild epigastric pain   Rhythm: normal sinus   Rate: 50-60  Axis: Normal  Ectopy: none  Conduction: normal  ST Segments/ T Waves: No ST-T wave changes and No acute ischemic changes  Q Waves: none  Comparison to prior: No old EKG available    Clinical Impression: normal EKG         Results for orders placed or performed during the hospital encounter of 10/26/21   Lipase     Status: Normal   Result Value Ref Range    Lipase 101 73 - 393 U/L   Comprehensive metabolic panel     Status: Abnormal   Result Value Ref Range    Sodium 140 133 - 144 mmol/L    Potassium 3.3 (L) 3.4 - 5.3 mmol/L    Chloride 109 94 - 109 mmol/L    Carbon Dioxide (CO2) 26 20 - 32 mmol/L    Anion Gap 5 3 - 14 mmol/L    Urea Nitrogen 10 7 - 30 mg/dL    Creatinine 0.56 0.52 - 1.04 mg/dL    Calcium 8.8 8.5 - 10.1 mg/dL    Glucose 106 (H) 70 - 99 mg/dL    Alkaline Phosphatase 80 40 - 150 U/L    AST 16 0 - 45 U/L    ALT 26 0 - 50 U/L    Protein Total 7.7 6.8 - 8.8 g/dL    Albumin 3.6 3.4 - 5.0 g/dL    Bilirubin Total 0.4 0.2 - 1.3 mg/dL    GFR Estimate >90 >60 mL/min/1.73m2   UA with Microscopic reflex to Culture     Status: Abnormal    Specimen: Urine, Clean Catch   Result Value Ref Range    Color Urine Light Yellow Colorless, Straw, Light Yellow, Yellow    Appearance Urine Clear Clear    Glucose Urine Negative Negative mg/dL    Bilirubin Urine Negative Negative    Ketones Urine Negative Negative mg/dL    Specific Gravity Urine 1.017 1.003 - 1.035    Blood Urine Negative Negative    pH Urine 7.0 5.0 - 7.0    Protein Albumin Urine Negative Negative mg/dL    Urobilinogen Urine Normal Normal, 2.0 mg/dL    Nitrite Urine Negative Negative    Leukocyte Esterase Urine Negative Negative    Mucus Urine Present (A) None Seen /LPF    RBC Urine 1 <=2 /HPF    WBC Urine <1 <=5 /HPF    Narrative    Urine Culture not indicated   CBC with platelets and differential     Status: None   Result Value Ref Range    WBC  Count 5.2 4.0 - 11.0 10e3/uL    RBC Count 4.61 3.80 - 5.20 10e6/uL    Hemoglobin 13.5 11.7 - 15.7 g/dL    Hematocrit 41.2 35.0 - 47.0 %    MCV 89 78 - 100 fL    MCH 29.3 26.5 - 33.0 pg    MCHC 32.8 31.5 - 36.5 g/dL    RDW 13.2 10.0 - 15.0 %    Platelet Count 166 150 - 450 10e3/uL    % Neutrophils 69 %    % Lymphocytes 21 %    % Monocytes 10 %    % Eosinophils 0 %    % Basophils 0 %    % Immature Granulocytes 0 %    NRBCs per 100 WBC 0 <1 /100    Absolute Neutrophils 3.6 1.6 - 8.3 10e3/uL    Absolute Lymphocytes 1.1 0.8 - 5.3 10e3/uL    Absolute Monocytes 0.5 0.0 - 1.3 10e3/uL    Absolute Eosinophils 0.0 0.0 - 0.7 10e3/uL    Absolute Basophils 0.0 0.0 - 0.2 10e3/uL    Absolute Immature Granulocytes 0.0 <=0.0 10e3/uL    Absolute NRBCs 0.0 10e3/uL   HCG qualitative urine     Status: Normal   Result Value Ref Range    hCG Urine Qualitative Negative Negative   CBC with platelets differential     Status: None    Narrative    The following orders were created for panel order CBC with platelets differential.  Procedure                               Abnormality         Status                     ---------                               -----------         ------                     CBC with platelets and d...[[id number]]                      Final result                 Please view results for these tests on the individual orders.     Medications   potassium chloride (KLOR-CON) Packet 20 mEq (has no administration in time range)   ondansetron (ZOFRAN-ODT) ODT tab 4 mg (4 mg Oral Given 10/26/21 1703)   lidocaine (XYLOCAINE) 2 % 15 mL, alum & mag hydroxide-simethicone (MAALOX) 15 mL GI Cocktail (30 mLs Oral Given 10/26/21 1703)   acetaminophen (TYLENOL) tablet 650 mg (650 mg Oral Given 10/26/21 1703)   ibuprofen (ADVIL/MOTRIN) tablet 600 mg (600 mg Oral Given 10/26/21 1703)        Assessments & Plan (with Medical Decision Making)   Trina Rooney is a 35 year old female w/ a PMHx of BCG vaccine,  x 2 (low transverse),  and H. Pylori presents w/ a day hx prior to admit of nausea and emesis x2.    Differential includes Gastritis / PUD (considering history of GERD), Gastroenteritis, Pancreatitis/Cholecystitis/Appendicits (however, physical exam is re-assuring and benign), Pyelonephritis / Cystitis (no urinary symptoms), pregnancy, and atypical MI (no CVD risk factors, Heart score low).    EKG was re-assuring against cardiac etiology. UA was unremarkable, re-assuring against UTI. CBC / CMP / Lipase were also unremarkable, re-assuring against acute abdominal and inflammatory processes.     Patient is stable during ED course. She was given Zofran and GI cocktail with significant symptom improvement. Labs re-assuring against acute processes.     Final diagnosis likely gastritis - multifactorial considering HPI. This could be related to food allergy, early sign of viral GI upset, or a sign of H Pylori as she has a history. Stable for home discharge. Will send with 10 days of Zofran PRN and 30 days Omeprazole 20mg. Follow up with primary for re-evaluation of symptoms and further diagnostics if necessary.    I have reviewed the nursing notes. I have reviewed the findings, diagnosis, plan and need for follow up with the patient.    --    ED Attending Physician Attestation    I Kenneth Levine DO, cared for this patient with the Resident. I have performed a history and physical examination of the patient and discussed management with the resident. I reviewed the resident's documentation above and agree with the documented findings and plan of care.    Summary of HPI, PE, ED Course   Patient is a 35 year old female evaluated in the emergency department for nausea, vomiting and epigastric pain. Exam notable for minor epigastric tenderness. ED course notable for potassium of 3.3. After the completion of care in the emergency department, the patient had resolution of symptoms and was discharged.    Critical Care & Procedures  Not  applicable.    Medical Decision Making  The medical record was reviewed and interpreted.  Current labs reviewed and interpreted.  EKG reviewed and interpreted:  .  Managed outpatient prescription medications.      Kenneth Levine,   Emergency Medicine       New Prescriptions    OMEPRAZOLE (PRILOSEC) 20 MG DR CAPSULE    Take 1 capsule (20 mg) by mouth daily    ONDANSETRON (ZOFRAN ODT) 4 MG ODT TAB    Take 1 tablet (4 mg) by mouth every 8 hours as needed for nausea       Final diagnoses:   Acute gastritis without hemorrhage, unspecified gastritis type       --  Patient was discussed w/ staff physician Dr. Julianna Gambino, DO  Family Medicine PGY1  [facility] EMERGENCY DEPARTMENT  10/26/2021     Kenneth Levine DO  10/26/21 4290

## 2021-10-26 NOTE — DISCHARGE INSTRUCTIONS
You were seen for abdominal pain and vomit in the ED. You are likely experiencing gastritis - this could have been from the food you ate, or an early sign of a stomach virus. It could also be H Pylori, as you have a history of this.  - We will send you home with anti nausea and anti stomach acid medicine for symptom relief  - You should follow up with your primary doctor by the end of next week to re-assess your symptoms and see if an H pylori  test would be good for you.

## 2021-12-10 ENCOUNTER — TELEPHONE (OUTPATIENT)
Dept: OBGYN | Facility: CLINIC | Age: 36
End: 2021-12-10
Payer: COMMERCIAL

## 2021-12-10 NOTE — TELEPHONE ENCOUNTER
Reason for call:  Form   Our goal is to have forms completed within 72 hours, however some forms may require a visit or additional information.     Who is the form from? Patient  Where did the form come from? Patient or family brought in     What clinic location was the form placed at? [facility]  Where was the form placed? [facility] Box/Folder  What number is listed as a contact on the form? [phone]    Phone call message - patient request for a letter, form or note:     Date needed: as soon as possible  Please call the patient when completed  Has the patient signed a consent form for release of information? Not Applicable    Additional comments: n/a    Type of letter, form or note: Consent Form    Phone number to reach patient:  Home number on file [phone] (home)    Best Time:  n/a    Can we leave a detailed message on this number?  YES    Travel screening: Not Applicable

## 2021-12-10 NOTE — TELEPHONE ENCOUNTER
There is a federal tubal consent form scanned into her chart with the date 9/1/2020.  It is noted on a prenatal visit on 8/24/2020 that it was signed at that visit.    Nette Bui MD

## 2021-12-10 NOTE — TELEPHONE ENCOUNTER
Pt had CS and PTL, but no consent for sterilization was found in the chart.  There is a blank form in your box to fill out.  PTL is mentioned several times in her prenatal visits.  Cori Mora RN

## 2021-12-13 NOTE — TELEPHONE ENCOUNTER
Unsure why we are receiving this just now.  TC to billing and confirmed that they have what they need.  Cori Mora RN

## 2022-05-05 ENCOUNTER — APPOINTMENT (OUTPATIENT)
Dept: GENERAL RADIOLOGY | Facility: CLINIC | Age: 37
End: 2022-05-05
Attending: EMERGENCY MEDICINE
Payer: COMMERCIAL

## 2022-05-05 ENCOUNTER — HOSPITAL ENCOUNTER (EMERGENCY)
Facility: CLINIC | Age: 37
Discharge: HOME | End: 2022-05-05
Attending: EMERGENCY MEDICINE | Admitting: EMERGENCY MEDICINE
Payer: COMMERCIAL

## 2022-05-05 ENCOUNTER — APPOINTMENT (OUTPATIENT)
Dept: ULTRASOUND IMAGING | Facility: CLINIC | Age: 37
End: 2022-05-05
Attending: EMERGENCY MEDICINE
Payer: COMMERCIAL

## 2022-05-05 VITALS
OXYGEN SATURATION: 99 % | HEART RATE: 70 BPM | SYSTOLIC BLOOD PRESSURE: 114 MMHG | DIASTOLIC BLOOD PRESSURE: 74 MMHG | BODY MASS INDEX: 27.48 KG/M2 | RESPIRATION RATE: 16 BRPM | WEIGHT: 171 LBS | HEIGHT: 66 IN | TEMPERATURE: 98.1 F

## 2022-05-05 DIAGNOSIS — M79.604 LEG PAIN, SUPERIOR, RIGHT: ICD-10-CM

## 2022-05-05 DIAGNOSIS — R06.02 SHORTNESS OF BREATH: ICD-10-CM

## 2022-05-05 DIAGNOSIS — M79.662 PAIN OF LEFT LOWER LEG: ICD-10-CM

## 2022-05-05 LAB
ALBUMIN SERPL-MCNC: 3.8 G/DL (ref 3.4–5)
ALP SERPL-CCNC: 76 U/L (ref 40–150)
ALT SERPL W P-5'-P-CCNC: 21 U/L (ref 0–50)
ANION GAP SERPL CALCULATED.3IONS-SCNC: 7 MMOL/L (ref 3–14)
AST SERPL W P-5'-P-CCNC: 15 U/L (ref 0–45)
BASOPHILS # BLD AUTO: 0 10E3/UL (ref 0–0.2)
BASOPHILS NFR BLD AUTO: 0 %
BILIRUB SERPL-MCNC: 0.3 MG/DL (ref 0.2–1.3)
BUN SERPL-MCNC: 5 MG/DL (ref 7–30)
CALCIUM SERPL-MCNC: 9.1 MG/DL (ref 8.5–10.1)
CHLORIDE BLD-SCNC: 109 MMOL/L (ref 94–109)
CO2 SERPL-SCNC: 24 MMOL/L (ref 20–32)
CREAT SERPL-MCNC: 0.49 MG/DL (ref 0.52–1.04)
EOSINOPHIL # BLD AUTO: 0 10E3/UL (ref 0–0.7)
EOSINOPHIL NFR BLD AUTO: 0 %
ERYTHROCYTE [DISTWIDTH] IN BLOOD BY AUTOMATED COUNT: 12.6 % (ref 10–15)
GFR SERPL CREATININE-BSD FRML MDRD: >90 ML/MIN/1.73M2
GLUCOSE BLD-MCNC: 95 MG/DL (ref 70–99)
HCT VFR BLD AUTO: 39.7 % (ref 35–47)
HGB BLD-MCNC: 13.1 G/DL (ref 11.7–15.7)
IMM GRANULOCYTES # BLD: 0 10E3/UL
IMM GRANULOCYTES NFR BLD: 0 %
LYMPHOCYTES # BLD AUTO: 1.3 10E3/UL (ref 0.8–5.3)
LYMPHOCYTES NFR BLD AUTO: 35 %
MCH RBC QN AUTO: 30.1 PG (ref 26.5–33)
MCHC RBC AUTO-ENTMCNC: 33 G/DL (ref 31.5–36.5)
MCV RBC AUTO: 91 FL (ref 78–100)
MONOCYTES # BLD AUTO: 0.4 10E3/UL (ref 0–1.3)
MONOCYTES NFR BLD AUTO: 11 %
NEUTROPHILS # BLD AUTO: 2 10E3/UL (ref 1.6–8.3)
NEUTROPHILS NFR BLD AUTO: 54 %
NRBC # BLD AUTO: 0 10E3/UL
NRBC BLD AUTO-RTO: 0 /100
PLATELET # BLD AUTO: 176 10E3/UL (ref 150–450)
POTASSIUM BLD-SCNC: 3.9 MMOL/L (ref 3.4–5.3)
PROT SERPL-MCNC: 7.5 G/DL (ref 6.8–8.8)
RBC # BLD AUTO: 4.35 10E6/UL (ref 3.8–5.2)
SODIUM SERPL-SCNC: 140 MMOL/L (ref 133–144)
TROPONIN I SERPL HS-MCNC: 3 NG/L
WBC # BLD AUTO: 3.8 10E3/UL (ref 4–11)

## 2022-05-05 PROCEDURE — 99285 EMERGENCY DEPT VISIT HI MDM: CPT | Mod: 25 | Performed by: EMERGENCY MEDICINE

## 2022-05-05 PROCEDURE — 93970 EXTREMITY STUDY: CPT

## 2022-05-05 PROCEDURE — 36415 COLL VENOUS BLD VENIPUNCTURE: CPT | Performed by: EMERGENCY MEDICINE

## 2022-05-05 PROCEDURE — 80053 COMPREHEN METABOLIC PANEL: CPT | Performed by: EMERGENCY MEDICINE

## 2022-05-05 PROCEDURE — 93005 ELECTROCARDIOGRAM TRACING: CPT | Performed by: EMERGENCY MEDICINE

## 2022-05-05 PROCEDURE — 71046 X-RAY EXAM CHEST 2 VIEWS: CPT

## 2022-05-05 PROCEDURE — 85025 COMPLETE CBC W/AUTO DIFF WBC: CPT | Performed by: EMERGENCY MEDICINE

## 2022-05-05 PROCEDURE — 72170 X-RAY EXAM OF PELVIS: CPT

## 2022-05-05 PROCEDURE — 84484 ASSAY OF TROPONIN QUANT: CPT | Performed by: EMERGENCY MEDICINE

## 2022-05-05 PROCEDURE — 82040 ASSAY OF SERUM ALBUMIN: CPT | Performed by: EMERGENCY MEDICINE

## 2022-05-05 PROCEDURE — 93010 ELECTROCARDIOGRAM REPORT: CPT | Performed by: EMERGENCY MEDICINE

## 2022-05-05 RX ORDER — ACETAMINOPHEN 500 MG
500-1000 TABLET ORAL EVERY 6 HOURS PRN
Qty: 60 TABLET | Refills: 0 | Status: SHIPPED | OUTPATIENT
Start: 2022-05-05 | End: 2022-05-12

## 2022-05-05 NOTE — ED NOTES
Pt states 3 days ago she started having left knee pain that radiates to behind the knee as well, denies any injuries to site. Pt denies any tobacco use or birth control use. Denies taking any medications at home to help with the pain.

## 2022-05-06 LAB
ATRIAL RATE - MUSE: 65 BPM
DIASTOLIC BLOOD PRESSURE - MUSE: NORMAL MMHG
INTERPRETATION ECG - MUSE: NORMAL
P AXIS - MUSE: 39 DEGREES
PR INTERVAL - MUSE: 150 MS
QRS DURATION - MUSE: 76 MS
QT - MUSE: 402 MS
QTC - MUSE: 418 MS
R AXIS - MUSE: 55 DEGREES
SYSTOLIC BLOOD PRESSURE - MUSE: NORMAL MMHG
T AXIS - MUSE: 50 DEGREES
VENTRICULAR RATE- MUSE: 65 BPM

## 2022-05-06 NOTE — ED PROVIDER NOTES
ED Provider Note  [facility]      History     Chief Complaint   Patient presents with     Leg Pain     L leg pain started 3 days ago, on knee and posterior side, swelling     HPI  Trina Rooney is a 36 year old female who presents with leg pain and shortness of breath.  Patient states that she has been having right posterior leg pain mostly when sitting for the past year.  She states that over the past 4 days she has noticed left lower leg pain behind and lateral to her knee.  This is with ambulation.  No injury or precipitating factors.  She has not had similar occurrences in the past.  Additionally patient states that she has noticed increasing shortness of breath with exertion, states that she is trying to exercise more frequently and feels short of breath with this.  No personal or family history of blood clots.  No recent illness.  No leg swelling.  No other symptoms noted.    Past Medical History  Past Medical History:   Diagnosis Date     H. pylori infection 2013     History of positive PPD      Latent tuberculosis      Placenta abruptio      Past Surgical History:   Procedure Laterality Date     C/SECTION, LOW TRANSVERSE N/A 2014    , Low Transverse x2      SECTION N/A 2019    Procedure:  SECTION;  Surgeon: Adalgisa Gaona MD;  Location: [facility]      SECTION, TUBAL LIGATION, COMBINED Bilateral 2020    Procedure:  SECTION, WITH POSTPARTUM TUBAL LIGATION;  Surgeon: Nette Bui MD;  Location: [facility]     acetaminophen (TYLENOL) 500 MG tablet  omeprazole (PRILOSEC) 20 MG DR capsule  ondansetron (ZOFRAN ODT) 4 MG ODT tab  Prenatal Vit-Fe Fumarate-FA (PRENATAL MULTIVITAMIN W/IRON) 27-0.8 MG tablet  vitamin D3 (CHOLECALCIFEROL) 2000 units (50 mcg) tablet      No Known Allergies  Family History  Family History   Problem Relation Age of Onset     Family History Negative Other      Social History  "  Social History     Tobacco Use     Smoking status: Never Smoker     Smokeless tobacco: Never Used   Substance Use Topics     Alcohol use: No     Drug use: No      Past medical history, past surgical history, medications, allergies, family history, and social history were reviewed with the patient. No additional pertinent items.       Review of Systems  A complete review of systems was performed with pertinent positives and negatives noted in the HPI, and all other systems negative.    Physical Exam   BP: 105/73  Pulse: 65  Temp: 98.1  F (36.7  C)  Resp: 16  Height: 167.6 cm (5' 6\")  Weight: 77.6 kg (171 lb)  SpO2: 100 %  Physical Exam  Vitals and nursing note reviewed.   Constitutional:       General: She is not in acute distress.     Appearance: She is well-developed. She is not diaphoretic.   HENT:      Head: Normocephalic and atraumatic.      Mouth/Throat:      Pharynx: No oropharyngeal exudate.   Eyes:      General: No scleral icterus.        Right eye: No discharge.         Left eye: No discharge.      Pupils: Pupils are equal, round, and reactive to light.   Cardiovascular:      Rate and Rhythm: Normal rate and regular rhythm.      Heart sounds: Normal heart sounds. No murmur heard.    No friction rub. No gallop.   Pulmonary:      Effort: Pulmonary effort is normal. No respiratory distress.      Breath sounds: Normal breath sounds. No wheezing.   Chest:      Chest wall: No tenderness.   Abdominal:      General: Bowel sounds are normal. There is no distension.      Palpations: Abdomen is soft.      Tenderness: There is no abdominal tenderness.   Musculoskeletal:         General: No tenderness or deformity. Normal range of motion.      Cervical back: Normal range of motion and neck supple.   Skin:     General: Skin is warm and dry.      Coloration: Skin is not pale.      Findings: No erythema or rash.   Neurological:      Mental Status: She is alert and oriented to person, place, and time.      Cranial Nerves: " No cranial nerve deficit.         ED Course      Procedures            EKG Interpretation:      Interpreted by Kenneth Levine DO  Time reviewed: 1830  Symptoms at time of EKG: none   Rhythm: normal sinus   Rate: 65  Axis: normal  Ectopy: none  Conduction: normal  ST Segments/ T Waves: No ST-T wave changes  Q Waves: none  Comparison to prior: No old EKG available    Clinical Impression: normal EKG                    Results for orders placed or performed during the hospital encounter of 05/05/22   Comprehensive metabolic panel     Status: None (Preliminary result)   Result Value Ref Range    Sodium 140 133 - 144 mmol/L    Potassium 3.9 3.4 - 5.3 mmol/L    Chloride 109 94 - 109 mmol/L    Carbon Dioxide (CO2)      Anion Gap      Urea Nitrogen      Creatinine      Calcium      Glucose      Alkaline Phosphatase      AST      ALT      Protein Total      Albumin      Bilirubin Total      GFR Estimate     CBC with platelets and differential     Status: Abnormal   Result Value Ref Range    WBC Count 3.8 (L) 4.0 - 11.0 10e3/uL    RBC Count 4.35 3.80 - 5.20 10e6/uL    Hemoglobin 13.1 11.7 - 15.7 g/dL    Hematocrit 39.7 35.0 - 47.0 %    MCV 91 78 - 100 fL    MCH 30.1 26.5 - 33.0 pg    MCHC 33.0 31.5 - 36.5 g/dL    RDW 12.6 10.0 - 15.0 %    Platelet Count 176 150 - 450 10e3/uL    % Neutrophils 54 %    % Lymphocytes 35 %    % Monocytes 11 %    % Eosinophils 0 %    % Basophils 0 %    % Immature Granulocytes 0 %    NRBCs per 100 WBC 0 <1 /100    Absolute Neutrophils 2.0 1.6 - 8.3 10e3/uL    Absolute Lymphocytes 1.3 0.8 - 5.3 10e3/uL    Absolute Monocytes 0.4 0.0 - 1.3 10e3/uL    Absolute Eosinophils 0.0 0.0 - 0.7 10e3/uL    Absolute Basophils 0.0 0.0 - 0.2 10e3/uL    Absolute Immature Granulocytes 0.0 <=0.4 10e3/uL    Absolute NRBCs 0.0 10e3/uL   EKG 12-lead, tracing only     Status: None (Preliminary result)   Result Value Ref Range    Systolic Blood Pressure  mmHg    Diastolic Blood Pressure  mmHg    Ventricular Rate 65 BPM     Atrial Rate 65 BPM    MA Interval 150 ms    QRS Duration 76 ms     ms    QTc 418 ms    P Axis 39 degrees    R AXIS 55 degrees    T Axis 50 degrees    Interpretation ECG Sinus rhythm  Normal ECG      CBC with platelets differential     Status: Abnormal    Narrative    The following orders were created for panel order CBC with platelets differential.  Procedure                               Abnormality         Status                     ---------                               -----------         ------                     CBC with platelets and d...[[id number]]  Abnormal            Final result                 Please view results for these tests on the individual orders.     Medications - No data to display     Assessments & Plan (with Medical Decision Making)   Is a 36-year-old female who presents with right upper leg pain, left lower leg pain as well as shortness of breath with exertion.  The right upper leg pain has been ongoing for the past year, left lower is for the past several days patient is unsure of duration of the shortness of breath.  Exam demonstrates no acute abnormalities.  There is no tenderness or leg swelling.  ECG is normal.  Lab work shows no acute abnormalities.  Chest x-ray shows no acute abnormalities.  X-ray of chest and pelvis show no acute abnormalities.  Ultrasound of bilateral lower extremity showed no DVT.  I discussed all results with patient.  Discussed that we cannot fully rule out PE however our suspicion is much lower in setting of normal vital signs and negative bilateral lower extremity ultrasounds.  Discussed doing D-dimer with patient.  She declines at this time.  Patient states she plans to make an appointment for these issues but called and was advised to get checked at the Emergency Department first.  Patient plans to continue with appointment. Will discharge home with return precautions. Discussed reasons to return to the emergency department.  Patient understands and  agrees with this plan.    I have reviewed the nursing notes. I have reviewed the findings, diagnosis, plan and need for follow up with the patient.    New Prescriptions    No medications on file       Final diagnoses:   None       --  Kenneth Levine DO  [facility] EMERGENCY DEPARTMENT  5/5/2022     Kenneth Levine DO  05/06/22 0121

## 2022-05-06 NOTE — DISCHARGE INSTRUCTIONS
Continue with plan for outpatient appointment. Return to the emergency department if symptoms continue, get worse, there are any new symptoms or any cause for concern.

## 2022-06-21 NOTE — TELEPHONE ENCOUNTER
LVMTCB- second attempt.  Fiordaliza Bull, Surgery Coordinator      bilateral lower extremity Passive ROM was WFL (within functional limits)

## 2022-08-26 ENCOUNTER — HOSPITAL ENCOUNTER (EMERGENCY)
Facility: CLINIC | Age: 37
Discharge: HOME | End: 2022-08-26
Attending: EMERGENCY MEDICINE | Admitting: EMERGENCY MEDICINE
Payer: COMMERCIAL

## 2022-08-26 ENCOUNTER — APPOINTMENT (OUTPATIENT)
Dept: ULTRASOUND IMAGING | Facility: CLINIC | Age: 37
End: 2022-08-26
Attending: EMERGENCY MEDICINE
Payer: COMMERCIAL

## 2022-08-26 VITALS
RESPIRATION RATE: 16 BRPM | HEIGHT: 66 IN | SYSTOLIC BLOOD PRESSURE: 110 MMHG | TEMPERATURE: 98.4 F | WEIGHT: 163 LBS | DIASTOLIC BLOOD PRESSURE: 72 MMHG | HEART RATE: 66 BPM | BODY MASS INDEX: 26.2 KG/M2 | OXYGEN SATURATION: 96 %

## 2022-08-26 DIAGNOSIS — N83.202 LEFT OVARIAN CYST: ICD-10-CM

## 2022-08-26 LAB
ALBUMIN UR-MCNC: NEGATIVE MG/DL
APPEARANCE UR: CLEAR
BACTERIA #/AREA URNS HPF: ABNORMAL /HPF
BILIRUB UR QL STRIP: NEGATIVE
COLOR UR AUTO: ABNORMAL
GLUCOSE UR STRIP-MCNC: NEGATIVE MG/DL
HCG SERPL QL: NEGATIVE
HCG UR QL: NEGATIVE
HGB UR QL STRIP: NEGATIVE
INTERNAL QC OK POCT: NORMAL
KETONES UR STRIP-MCNC: NEGATIVE MG/DL
LEUKOCYTE ESTERASE UR QL STRIP: NEGATIVE
MUCOUS THREADS #/AREA URNS LPF: PRESENT /LPF
NITRATE UR QL: NEGATIVE
PH UR STRIP: 6 [PH] (ref 5–7)
POCT KIT EXPIRATION DATE: NORMAL
POCT KIT LOT NUMBER: NORMAL
RBC URINE: 1 /HPF
SP GR UR STRIP: 1.01 (ref 1–1.03)
SQUAMOUS EPITHELIAL: <1 /HPF
UROBILINOGEN UR STRIP-MCNC: NORMAL MG/DL
WBC URINE: <1 /HPF

## 2022-08-26 PROCEDURE — 99284 EMERGENCY DEPT VISIT MOD MDM: CPT | Mod: 25 | Performed by: EMERGENCY MEDICINE

## 2022-08-26 PROCEDURE — 81001 URINALYSIS AUTO W/SCOPE: CPT | Performed by: EMERGENCY MEDICINE

## 2022-08-26 PROCEDURE — 76830 TRANSVAGINAL US NON-OB: CPT

## 2022-08-26 PROCEDURE — 81025 URINE PREGNANCY TEST: CPT

## 2022-08-26 PROCEDURE — 84703 CHORIONIC GONADOTROPIN ASSAY: CPT | Performed by: EMERGENCY MEDICINE

## 2022-08-26 PROCEDURE — 81025 URINE PREGNANCY TEST: CPT | Performed by: EMERGENCY MEDICINE

## 2022-08-26 PROCEDURE — 36415 COLL VENOUS BLD VENIPUNCTURE: CPT | Performed by: EMERGENCY MEDICINE

## 2022-08-26 PROCEDURE — 99284 EMERGENCY DEPT VISIT MOD MDM: CPT | Mod: GC | Performed by: EMERGENCY MEDICINE

## 2022-08-26 ASSESSMENT — ACTIVITIES OF DAILY LIVING (ADL): ADLS_ACUITY_SCORE: 35

## 2022-08-26 NOTE — ED TRIAGE NOTES
LMP 7/20, unsure if pregnant, noted lower abdominal pain yesterday     Triage Assessment     Row Name 08/26/22 0916       Triage Assessment (Adult)    Airway WDL WDL       Respiratory WDL    Respiratory WDL WDL       Skin Circulation/Temperature WDL    Skin Circulation/Temperature WDL WDL       Cardiac WDL    Cardiac WDL WDL       Peripheral/Neurovascular WDL    Peripheral Neurovascular WDL WDL

## 2022-08-26 NOTE — ED PROVIDER NOTES
ED Provider Note  [facility]      History     Chief Complaint   Patient presents with     Pregnancy Test     LMP , unsure if pregnant, noted lower abdominal pain yesterday     HPI  Trina Rooney is a 36 year old female who presented with complaints of a late menstrual period and concern for possible ectopic pregnancy.  Per patient, her last menstrual period was on 22 and she always has her period regularly every 28 days, unless she is pregnant. Pt reports that she is currently experiencing the same symptoms she had during her prior pregnancies (increased hunger, sleep, and AM nausea). Of note, pt had a bilateral tubal ligation in 2020, so she was concerned about ectopic pregnancy, so she came to the ED. She stated that she is having a very slight headache and fatigue, and only has abdominal pain if she is laying on her stomach.    Past Medical History  Past Medical History:   Diagnosis Date     H. pylori infection 2013     History of positive PPD      Latent tuberculosis      Placenta abruptio      Past Surgical History:   Procedure Laterality Date     C/SECTION, LOW TRANSVERSE N/A 2014    , Low Transverse x2      SECTION N/A 2019    Procedure:  SECTION;  Surgeon: Adalgisa Gaona MD;  Location: [facility]      SECTION, TUBAL LIGATION, COMBINED Bilateral 2020    Procedure:  SECTION, WITH POSTPARTUM TUBAL LIGATION;  Surgeon: Nette Bui MD;  Location: [facility]     omeprazole (PRILOSEC) 20 MG DR capsule  ondansetron (ZOFRAN ODT) 4 MG ODT tab  Prenatal Vit-Fe Fumarate-FA (PRENATAL MULTIVITAMIN W/IRON) 27-0.8 MG tablet  vitamin D3 (CHOLECALCIFEROL) 2000 units (50 mcg) tablet      No Known Allergies  Family History  Family History   Problem Relation Age of Onset     Family History Negative Other      Social History   Social History     Tobacco Use     Smoking status: Never Smoker      "Smokeless tobacco: Never Used   Substance Use Topics     Alcohol use: No     Drug use: No      Past medical history, past surgical history, medications, allergies, family history, and social history were reviewed with the patient. No additional pertinent items.       Review of Systems  A complete review of systems was performed with pertinent positives and negatives noted in the HPI, and all other systems negative.    Physical Exam   BP: 116/73  Pulse: 66  Temp: 98.4  F (36.9  C)  Resp: 16  Height: 167.6 cm (5' 6\")  Weight: 73.9 kg (163 lb)  SpO2: 100 %  Physical Exam  Constitutional:       Appearance: Normal appearance. She is normal weight.   HENT:      Head: Normocephalic and atraumatic.   Eyes:      Extraocular Movements: Extraocular movements intact.      Pupils: Pupils are equal, round, and reactive to light.   Cardiovascular:      Rate and Rhythm: Normal rate and regular rhythm.      Pulses: Normal pulses.      Heart sounds: Normal heart sounds.   Pulmonary:      Effort: Pulmonary effort is normal.      Breath sounds: Normal breath sounds.   Abdominal:      General: Abdomen is flat. A surgical scar is present. Bowel sounds are normal.      Palpations: Abdomen is soft.      Tenderness: There is abdominal tenderness (Lower abdomen, beneath belly button) in the suprapubic area.   Neurological:      General: No focal deficit present.      Mental Status: She is alert and oriented to person, place, and time.       ED Course      Procedures                       Results for orders placed or performed during the hospital encounter of 08/26/22   US Pelvis Cmplt w Transvag & Doppler LmtPel Duplex Limited     Status: None    Narrative    PELVIC ULTRASOUND WITH ENDOVAGINAL TRANSDUCER  8/26/2022 10:33 AM     HISTORY: Pelvic pain right lower quadrant.    TECHNIQUE:  Endovaginal sonography was added to the transabdominal  exam.    COMPARISON: Pelvic ultrasound on 8/7/2017.    FINDINGS:   Endometrium: Endometrium is 7 mm " thick.    Uterus: Measures 7.0 x 5.9 x 4.3 cm. No uterine masses.    Right ovary: It measures 1.8 x 1.8 x 2.1 cm. It demonstrates normal  follicular structure and color-flow.    Left ovary: It measures 2.3 x 2.4 x 2.4 cm. It demonstrates normal  follicular structure and color-flow. Mildly complex cyst in the left  ovary, indeterminate, could represent hemorrhagic cyst.    Additional findings: Small to moderate debris-containing fluid in the  pelvis.      Impression    IMPRESSION:    1. Small mildly complex cyst in the left ovary, indeterminate, could  represent hemorrhagic cyst. Follow-up pelvic ultrasound in 6 weeks can  confirm resolution.  2. Small to moderate debris-containing fluid in the pelvis of  indeterminate etiology.    ELIEL ANDINO MD         SYSTEM ID:  [id number]   HCG qualitative pregnancy (blood)     Status: Normal   Result Value Ref Range    hCG Serum Qualitative Negative Negative   UA with Microscopic reflex to Culture     Status: Abnormal    Specimen: Urine, NOS   Result Value Ref Range    Color Urine Light Yellow Colorless, Straw, Light Yellow, Yellow    Appearance Urine Clear Clear    Glucose Urine Negative Negative mg/dL    Bilirubin Urine Negative Negative    Ketones Urine Negative Negative mg/dL    Specific Gravity Urine 1.011 1.003 - 1.035    Blood Urine Negative Negative    pH Urine 6.0 5.0 - 7.0    Protein Albumin Urine Negative Negative mg/dL    Urobilinogen Urine Normal Normal, 2.0 mg/dL    Nitrite Urine Negative Negative    Leukocyte Esterase Urine Negative Negative    Bacteria Urine Few (A) None Seen /HPF    Mucus Urine Present (A) None Seen /LPF    RBC Urine 1 <=2 /HPF    WBC Urine <1 <=5 /HPF    Squamous Epithelials Urine <1 <=1 /HPF    Narrative    Urine Culture not indicated   hCG qual urine POCT     Status: Normal   Result Value Ref Range    HCG Qual Urine Negative Negative    Internal QC Check POCT Valid Valid    POCT Kit Lot Number [id number]     POCT Kit Expiration Date  09/30/23      Medications - No data to display     Assessments & Plan (with Medical Decision Making)   36 yr old woman with hx of c section x3 and BTL presenting with late menstrual period. LMP 7/20/22. Pt is stable with slight abdominal tenderness in the suprapubic area on palpation. Urine pregrancy test is negative. Blood bHCG, UA, and pelvic ultrasound were also ordered, with no significant findings.    DDx: Ectopic Pregnancy, Stress induced menstrual cycle dysregulation    Ectopic pregnancy less likely secondary to negative urine and blood pregnancy tests, as well as ultrasound report that was remarkable for only a left ovarian cyst and small debris containing fluid in the pelvis of indeterminate etiology. 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    Pt was given reassurance about lab findings and was told to take OTC medications for any ovarian pain.     I have reviewed the nursing notes. I have reviewed the findings, diagnosis, plan and need for follow up with the patient.    New Prescriptions    No medications on file       Final diagnoses:   Left ovarian cyst     Halley Kwon MD  Psychiatry PGY1  ----    ED Attending Physician Attestation    I Christi Ocampo MD, cared for this patient with the Resident. I have performed a history and physical examination of the patient and discussed management with the resident. I reviewed the resident's documentation above and agree with the documented findings and plan of care.    Summary of HPI, PE, ED Course   Patient is a 36 year old female evaluated in the emergency department for concerns that she is pregnant and lower abdominal pain..  She is status post tubal ligation, is having all the symptoms of her last pregnancy, including fatigue, nausea and hunger and is concerned about an ectopic. Exam notable for mild tenderness in the low right lower quadrant with no guarding and rebound.. ED course notable for negative blood and  urine tests.  UA is nitrite and LCE negative.  Pelvic ultrasound shows IMPRESSION:    1. Small mildly complex cyst in the left ovary, indeterminate, could  represent hemorrhagic cyst. Follow-up pelvic ultrasound in 6 weeks can  confirm resolution.  2. Small to moderate debris-containing fluid in the pelvis of  indeterminate etiology.    The patient was reassured that she is not pregnant.  Menstrual cycles can be affected by multiple causes.  Ovarian cysts tend to change during the menstrual cycle.  She can take ibuprofen or Tylenol as needed for pain.  Heating pads to the abdomen may help with her pain.  She was instructed to follow-up with her regular physician for reevaluation and we discussed reasons to return to the emergency department.  After the completion of care in the emergency department, the patient was discharged.    Critical Care & Procedures  Not applicable.    Medical Decision Making  The medical record was reviewed and interpreted.  Current labs reviewed and interpreted.  Current images reviewed and interpreted: see above.      Christi Ocampo MD  Emergency Medicine      Christi Ocampo  [facility] EMERGENCY DEPARTMENT  8/26/2022     Christi Ocampo MD  08/26/22 1119

## 2022-08-26 NOTE — DISCHARGE INSTRUCTIONS
Your blood and urine pregnancy tests are negative.  Your ultrasound shows that you have an ovarian cyst on the left.  This is very common.  Please follow-up with your regular doctor for reevaluation

## 2022-11-15 ENCOUNTER — HOSPITAL ENCOUNTER (EMERGENCY)
Facility: CLINIC | Age: 37
Discharge: HOME | End: 2022-11-15
Attending: EMERGENCY MEDICINE | Admitting: EMERGENCY MEDICINE
Payer: COMMERCIAL

## 2022-11-15 ENCOUNTER — APPOINTMENT (OUTPATIENT)
Dept: GENERAL RADIOLOGY | Facility: CLINIC | Age: 37
End: 2022-11-15
Attending: EMERGENCY MEDICINE
Payer: COMMERCIAL

## 2022-11-15 VITALS
HEART RATE: 73 BPM | RESPIRATION RATE: 18 BRPM | OXYGEN SATURATION: 100 % | DIASTOLIC BLOOD PRESSURE: 69 MMHG | SYSTOLIC BLOOD PRESSURE: 123 MMHG | TEMPERATURE: 98.3 F

## 2022-11-15 DIAGNOSIS — M54.6 ACUTE LEFT-SIDED THORACIC BACK PAIN: ICD-10-CM

## 2022-11-15 DIAGNOSIS — R07.9 CHEST PAIN, UNSPECIFIED TYPE: ICD-10-CM

## 2022-11-15 LAB
ANION GAP SERPL CALCULATED.3IONS-SCNC: 10 MMOL/L (ref 7–15)
ATRIAL RATE - MUSE: 66 BPM
BASOPHILS # BLD AUTO: 0 10E3/UL (ref 0–0.2)
BASOPHILS NFR BLD AUTO: 0 %
BUN SERPL-MCNC: 6 MG/DL (ref 6–20)
CALCIUM SERPL-MCNC: 9.3 MG/DL (ref 8.6–10)
CHLORIDE SERPL-SCNC: 106 MMOL/L (ref 98–107)
CREAT SERPL-MCNC: 0.51 MG/DL (ref 0.51–0.95)
DEPRECATED HCO3 PLAS-SCNC: 25 MMOL/L (ref 22–29)
DIASTOLIC BLOOD PRESSURE - MUSE: NORMAL MMHG
EOSINOPHIL # BLD AUTO: 0 10E3/UL (ref 0–0.7)
EOSINOPHIL NFR BLD AUTO: 0 %
ERYTHROCYTE [DISTWIDTH] IN BLOOD BY AUTOMATED COUNT: 12.5 % (ref 10–15)
GFR SERPL CREATININE-BSD FRML MDRD: >90 ML/MIN/1.73M2
GLUCOSE SERPL-MCNC: 107 MG/DL (ref 70–99)
HCT VFR BLD AUTO: 40.6 % (ref 35–47)
HGB BLD-MCNC: 13 G/DL (ref 11.7–15.7)
IMM GRANULOCYTES # BLD: 0 10E3/UL
IMM GRANULOCYTES NFR BLD: 0 %
INTERPRETATION ECG - MUSE: NORMAL
LYMPHOCYTES # BLD AUTO: 1.1 10E3/UL (ref 0.8–5.3)
LYMPHOCYTES NFR BLD AUTO: 31 %
MCH RBC QN AUTO: 29.5 PG (ref 26.5–33)
MCHC RBC AUTO-ENTMCNC: 32 G/DL (ref 31.5–36.5)
MCV RBC AUTO: 92 FL (ref 78–100)
MONOCYTES # BLD AUTO: 0.3 10E3/UL (ref 0–1.3)
MONOCYTES NFR BLD AUTO: 9 %
NEUTROPHILS # BLD AUTO: 2.1 10E3/UL (ref 1.6–8.3)
NEUTROPHILS NFR BLD AUTO: 60 %
NRBC # BLD AUTO: 0 10E3/UL
NRBC BLD AUTO-RTO: 0 /100
P AXIS - MUSE: 61 DEGREES
PLATELET # BLD AUTO: 172 10E3/UL (ref 150–450)
POTASSIUM SERPL-SCNC: 3.8 MMOL/L (ref 3.4–5.3)
PR INTERVAL - MUSE: 154 MS
QRS DURATION - MUSE: 82 MS
QT - MUSE: 402 MS
QTC - MUSE: 421 MS
R AXIS - MUSE: 52 DEGREES
RBC # BLD AUTO: 4.4 10E6/UL (ref 3.8–5.2)
SODIUM SERPL-SCNC: 141 MMOL/L (ref 136–145)
SYSTOLIC BLOOD PRESSURE - MUSE: NORMAL MMHG
T AXIS - MUSE: 42 DEGREES
TROPONIN T SERPL HS-MCNC: <6 NG/L
VENTRICULAR RATE- MUSE: 66 BPM
WBC # BLD AUTO: 3.6 10E3/UL (ref 4–11)

## 2022-11-15 PROCEDURE — 96374 THER/PROPH/DIAG INJ IV PUSH: CPT

## 2022-11-15 PROCEDURE — 250N000011 HC RX IP 250 OP 636: Performed by: EMERGENCY MEDICINE

## 2022-11-15 PROCEDURE — 36415 COLL VENOUS BLD VENIPUNCTURE: CPT | Performed by: EMERGENCY MEDICINE

## 2022-11-15 PROCEDURE — 84484 ASSAY OF TROPONIN QUANT: CPT | Performed by: EMERGENCY MEDICINE

## 2022-11-15 PROCEDURE — 99285 EMERGENCY DEPT VISIT HI MDM: CPT | Mod: 25

## 2022-11-15 PROCEDURE — 93005 ELECTROCARDIOGRAM TRACING: CPT

## 2022-11-15 PROCEDURE — 85025 COMPLETE CBC W/AUTO DIFF WBC: CPT | Performed by: EMERGENCY MEDICINE

## 2022-11-15 PROCEDURE — 82310 ASSAY OF CALCIUM: CPT | Performed by: EMERGENCY MEDICINE

## 2022-11-15 PROCEDURE — 71046 X-RAY EXAM CHEST 2 VIEWS: CPT

## 2022-11-15 RX ORDER — KETOROLAC TROMETHAMINE 15 MG/ML
15 INJECTION, SOLUTION INTRAMUSCULAR; INTRAVENOUS ONCE
Status: COMPLETED | OUTPATIENT
Start: 2022-11-15 | End: 2022-11-15

## 2022-11-15 RX ORDER — IBUPROFEN 800 MG/1
800 TABLET, FILM COATED ORAL EVERY 8 HOURS PRN
Qty: 30 TABLET | Refills: 0 | Status: SHIPPED | OUTPATIENT
Start: 2022-11-15 | End: 2022-11-23

## 2022-11-15 RX ORDER — CYCLOBENZAPRINE HCL 10 MG
10 TABLET ORAL 3 TIMES DAILY PRN
Qty: 10 TABLET | Refills: 0 | Status: SHIPPED | OUTPATIENT
Start: 2022-11-15

## 2022-11-15 RX ADMIN — KETOROLAC TROMETHAMINE 15 MG: 15 INJECTION, SOLUTION INTRAMUSCULAR; INTRAVENOUS at 09:52

## 2022-11-15 ASSESSMENT — ENCOUNTER SYMPTOMS
FEVER: 0
SHORTNESS OF BREATH: 0
BACK PAIN: 1
COUGH: 0
VOMITING: 0

## 2022-11-15 ASSESSMENT — ACTIVITIES OF DAILY LIVING (ADL): ADLS_ACUITY_SCORE: 33

## 2022-11-15 NOTE — ED PROVIDER NOTES
History   Chief Complaint:  Back Pain     The history is provided by the patient.      Trina Rooney is a 36 year old female with history of acute kidney injury and bowel obstruction who presents with mid/left sided back pain worsening since it awoke her from sleep yesterday morning. The patient states her discomfort has been constant and notes radiation into her chest, exacerbated with certain movements and unchanged with deep breathing. Persisting pain without relief prompted concern and her presentation to ED at that time. She has otherwise been well and without fever, vomiting, shortness of breath, hemoptysis, or other symptoms. She denies similar pain in the past. The patient denies history of blood clots, use of estrogen or other birth control, or recent travel. She denies pertinent past medical history or use of daily medication.    Review of Systems   Constitutional: Negative for fever.   Respiratory: Negative for cough (no hemoptysis) and shortness of breath.    Cardiovascular: Positive for chest pain.   Gastrointestinal: Negative for vomiting.   Musculoskeletal: Positive for back pain.   All other systems reviewed and are negative.     Allergies:  No known drug allergies    Medications:  Omeprazole  Ondansetron  Pantoprazole  Ranitidine  Cholecalciferol    Past Medical History:     H. Pylori infection  Latent tuberculosis  Placental abruption  Bowel obstruction  Acute kidney injury  Chorioamnionitis  Vitamin D deficiency    Past Surgical History:     section x4  Tubal ligation, bilateral    Family History:    Stomach cancer  Hypertension    Social History:  The patient presents to the ED with her daughter.  The patient arrived in a private vehicle.  PCP: No Ref-Primary, Physician     Physical Exam     Patient Vitals for the past 24 hrs:   BP Temp Pulse Resp SpO2   11/15/22 0922 -- -- -- -- 99 %   11/15/22 0921 119/68 -- 110 -- 99 %   11/15/22 0713 130/77 98.3  F (36.8  C) 75 18 100 %      Physical Exam      Eyes:    Conjunctiva normal  Neck:    Supple, no meningismus.     CV:     Regular rate and rhythm.      No murmurs, rubs or gallops.       No unilateral leg swelling.       2+ radial pulses bilateral.       No lower extremity edema.  PULM:    Clear to auscultation bilateral.       No respiratory distress.      Good air exchange.     No rales or wheezing.     No stridor.  ABD:    Soft, non-tender, non-distended.       No pulsatile masses.       No rebound, guarding or rigidity.  MSK:     No gross deformity to all four extremities.      Mild focal tenderness to left thoracic musculature just inferior to left scapula  LYMPH:   No cervical lymphadenopathy.  NEURO:   Alert. Good muscle tone, no atrophy.  Skin:    Warm, dry and intact.    Psych:    Mood is good and affect is appropriate.        Emergency Department Course   ECG  ECG taken at 0905, ECG read at 0914  Normal sinus rhythm.  Normal ECG.  No significant change as compared to prior EKG dated 5/5/22   Rate 66 bpm. NH interval 154 ms. QRS duration 82 ms. QT/QTc 402/421 ms. P-R-T axis 61 52 42.     Imaging:  Chest XR,  PA & LAT   Final Result   IMPRESSION: Normal.      MACK FARRELL MD            SYSTEM ID:  [id number]        Report per radiology    Laboratory:  Labs Ordered and Resulted from Time of ED Arrival to Time of ED Departure   BASIC METABOLIC PANEL - Abnormal       Result Value    Sodium 141      Potassium 3.8      Chloride 106      Carbon Dioxide (CO2) 25      Anion Gap 10      Urea Nitrogen 6.0      Creatinine 0.51      Calcium 9.3      Glucose 107 (*)     GFR Estimate >90     CBC WITH PLATELETS AND DIFFERENTIAL - Abnormal    WBC Count 3.6 (*)     RBC Count 4.40      Hemoglobin 13.0      Hematocrit 40.6      MCV 92      MCH 29.5      MCHC 32.0      RDW 12.5      Platelet Count 172      % Neutrophils 60      % Lymphocytes 31      % Monocytes 9      % Eosinophils 0      % Basophils 0      % Immature Granulocytes 0      NRBCs per 100  WBC 0      Absolute Neutrophils 2.1      Absolute Lymphocytes 1.1      Absolute Monocytes 0.3      Absolute Eosinophils 0.0      Absolute Basophils 0.0      Absolute Immature Granulocytes 0.0      Absolute NRBCs 0.0     TROPONIN T, HIGH SENSITIVITY - Normal    Troponin T, High Sensitivity <6        Emergency Department Course:      Reviewed:  I reviewed nursing notes, vitals, past medical history and Care Everywhere.    Assessments:  0837 I obtained history and examined the patient as noted above.   1022 I rechecked the patient and explained findings. I believe that they are safe for discharge at this time.     Interventions:  952 Toradol 15 mg IV    Disposition:  The patient was discharged to home.     Impression & Plan     Medical Decision Makin-year-old female presents the ED with atypical left-sided thoracic back pain radiating to her chest.  EKG without ischemic changes.  Troponin within normal limits despite greater than 12 hours of constant symptoms thus no indication for serial enzymes.  Low risk for pulmonary embolism and is PERC negative thus no indication for D-dimer or CT scan of the chest.  The remainder of her evaluation was unremarkable.  History and evaluation most consistent with musculoskeletal source given the focal tenderness at site of discomfort.  She will be discharged home with ibuprofen and Flexeril.  Close follow-up with primary physician and return to ED for any worsening symptoms.    Diagnosis:    ICD-10-CM    1. Acute left-sided thoracic back pain  M54.6       2. Chest pain, unspecified type  R07.9         Discharge Medications:  New Prescriptions    CHLORHEXIDINE (HIBICLENS) 4 % LIQUID    Apply topically daily as needed for other (skin irritation)    CYCLOBENZAPRINE (FLEXERIL) 10 MG TABLET    Take 1 tablet (10 mg) by mouth 3 times daily as needed for muscle spasms or other (back pain)    IBUPROFEN (ADVIL/MOTRIN) 800 MG TABLET    Take 1 tablet (800 mg) by mouth every 8 hours as  needed for moderate pain (4-6)     Scribe Disclosure:  I, Amina Gupta, am serving as a scribe at 8:35 AM on 11/15/2022 to document services personally performed by Slava Zheng MD based on my observations and the provider's statements to me.     Slava Zheng MD  11/15/22 4640

## 2022-11-15 NOTE — LETTER
11/15/22      To Whom it may concern:    Dinesh was in our Emergency Department today, 11/15/22. with a patient who needed their assistance.  Please excuse them from work/school.      Sincerely,

## 2022-11-15 NOTE — ED TRIAGE NOTES
Patient presents with complaints of upper left sided back pain which started yesterday. Denies any injury and no medication taken PTA. ABC intact without need for intervention at this time.        Triage Assessment     Row Name 11/15/22 0714       Triage Assessment (Adult)    Airway WDL WDL       Respiratory WDL    Respiratory WDL WDL       Skin Circulation/Temperature WDL    Skin Circulation/Temperature WDL WDL       Cardiac WDL    Cardiac WDL WDL       Peripheral/Neurovascular WDL    Peripheral Neurovascular WDL WDL       Cognitive/Neuro/Behavioral WDL    Cognitive/Neuro/Behavioral WDL WDL

## 2022-11-15 NOTE — DISCHARGE INSTRUCTIONS
Please make an appointment to follow up with your primary care provider in 2-3 days even if entirely better.    Discharge Instructions  Back Pain  You were seen today for back pain. Back pain can have many causes, but most will get better without surgery or other specific treatment. Sometimes there is a herniated ( slipped ) disc. We do not usually do MRI scans to look for these right away, since most herniated discs will get better on their own with time.  Today, we did not find any evidence that your back pain was caused by a serious condition. However, sometimes symptoms develop over time and cannot be found during an emergency visit, so it is very important that you follow up with your primary provider.  Generally, every Emergency Department visit should have a follow-up clinic visit with either a primary or a specialty clinic/provider. Please follow-up as instructed by your emergency provider today.    Return to the Emergency Department if:  You develop a fever with your back pain.   You have weakness or change in sensation in one or both legs.  You lose control of your bowels or bladder, or cannot empty your bladder (cannot pee).  Your pain gets much worse.     Follow-up with your provider:  Unless your pain has completely gone away, please make an appointment with your provider within one week. Most of the routine care for back pain is available in a clinic and not the Emergency Department. You may need further management of your back pain, such as more pain medication, imaging such as an X-ray or MRI, or physical therapy.    What can I do to help myself?  Remain Active -- People are often afraid that they will hurt their back further or delay recovery by remaining active, but this is one of the best things you can do for your back. In fact, staying in bed for a long time to rest is not recommended. Studies have shown that people with low back pain recover faster when they remain active. Movement helps to  bring blood flow to the muscles and relieve muscle spasms as well as preventing loss of muscle strength.  Heat -- Using a heating pad can help with low back pain during the first few weeks. Do not sleep with a heating pad, as you can be burned.   Pain medications - You may take a pain medication such as Tylenol  (acetaminophen), Advil , Motrin  (ibuprofen) or Aleve  (naproxen).  If you were given a prescription for medicine here today, be sure to read all of the information (including the package insert) that comes with your prescription.  This will include important information about the medicine, its side effects, and any warnings that you need to know about.  The pharmacist who fills the prescription can provide more information and answer questions you may have about the medicine.  If you have questions or concerns that the pharmacist cannot address, please call or return to the Emergency Department.   Remember that you can always come back to the Emergency Department if you are not able to see your regular provider in the amount of time listed above, if you get any new symptoms, or if there is anything that worries you.  Discharge Instructions  Chest Pain    You have been seen today for chest pain or discomfort.  At this time, your provider has found no signs that your chest pain is due to a serious or life-threatening condition, (or you have declined more testing and/or admission to the hospital). However, sometimes there is a serious problem that does not show up right away. Your evaluation today may not be complete and you may need further testing and evaluation.     Generally, every Emergency Department visit should have a follow-up clinic visit with either a primary or a specialty clinic/provider. Please follow-up as instructed by your emergency provider today.  Return to the Emergency Department if:  Your chest pain changes, gets worse, starts to happen more often, or comes with less activity.  You are  newly short of breath.  You get very weak or tired.  You pass out or faint.  You have any new symptoms, like fever, cough, numb legs, or you cough up blood.  You have anything else that worries you.    Until you follow-up with your regular provider, please do the following:  Take one aspirin daily unless you have an allergy or are told not to by your provider.  If a stress test appointment has been made, go to the appointment.  If you have questions, contact your regular provider.  Follow-up with your regular provider/clinic as directed; this is very important.    If you were given a prescription for medicine here today, be sure to read all of the information (including the package insert) that comes with your prescription.  This will include important information about the medicine, its side effects, and any warnings that you need to know about.  The pharmacist who fills the prescription can provide more information and answer questions you may have about the medicine.  If you have questions or concerns that the pharmacist cannot address, please call or return to the Emergency Department.       Remember that you can always come back to the Emergency Department if you are not able to see your regular provider in the amount of time listed above, if you get any new symptoms, or if there is anything that worries you.

## 2022-12-07 NOTE — DISCHARGE SUMMARY
DELIVERY DISCHARGE SUMMARY    Trina Rooney  : 1985  MRN: [id number]    Admit date: 2020  Discharge date: 2020    Admit Dx:   - 34 year old  at 39w0d  - hx of latent TB  - hx of  x2  - Rubella non-immune    Discharge Dx:  - Same as above, s/p procedure below    Procedures:  - Repeat low transverse  section with double layer closure via Pfannenstiel incision  - Bilateral tubal ligation  - Spinal anesthesia  - TAP block    Admit HPI:  Ms. Trina Rooney is a 34 year old  at 39w0d who was admitted on 2020 for a scheduled  section.     Please see her admit H&P for full details of her PMH, PSH, Meds, Allergies and exam on admit.    Consults:  Anesthesia  Lactation    Hospital course:    Findings:   1. Moderate subcutaneous and rectofascial adhesions.  Omentum was adherent to anterior abdominal wall, right > left.  Otherwise, minimal intraabdominal adhesions.  2. Clear amniotic fluid  3. Liveborn male infant in vertex presentation. Apgars 7 at 1 minute & 8 at 5 minutes. Weight 3760g.  4. Arterial cord pH 7.22, base deficit 7.3. Venous cord pH 7.3, base deficit 4.2.  5. Normal uterus, fallopian tubes, and ovaries.     EBL from the delivery was 364 mL. Please see her  Section Operative Note for full details regarding her delivery.    Her postoperative course was uncomplicated . On POD#2, she was meeting all of her postpartum goals and deemed stable for discharge. She was voiding without difficulty, tolerating a regular diet without nausea and vomiting, her pain was well controlled on oral pain medicines and her lochia was appropriate. Her hemoglobin prior to delivery was 12.3 and after delivery was 11.4. Her Rh status was positive and Rhogam was not indicated.      HGB  Recent Labs   Lab 20  0746 20  1302   HGB 11.4* 12.3       Discharge Medications:     Review of your medicines      START taking      Dose / Directions  2 spacers given with instruction     acetaminophen 325 MG tablet  Commonly known as: TYLENOL  Used for: History of  delivery      Dose: 650 mg  Take 2 tablets (650 mg) by mouth every 6 hours as needed for mild pain Start after Delivery.  Quantity: 100 tablet  Refills: 0     ibuprofen 600 MG tablet  Commonly known as: ADVIL/MOTRIN  Used for: History of  delivery      Dose: 600 mg  Take 1 tablet (600 mg) by mouth every 6 hours as needed for moderate pain Start after delivery  Quantity: 60 tablet  Refills: 0     oxyCODONE 5 MG tablet  Commonly known as: ROXICODONE  Used for: History of  delivery      Dose: 5 mg  Take 1 tablet (5 mg) by mouth every 6 hours as needed for severe pain  Quantity: 3 tablet  Refills: 0     senna-docusate 8.6-50 MG tablet  Commonly known as: SENOKOT-S/PERICOLACE  Used for: History of  delivery      Dose: 1 tablet  Take 1 tablet by mouth daily Start after delivery.  Quantity: 100 tablet  Refills: 0        CONTINUE these medicines which have NOT CHANGED      Dose / Directions   augmented betamethasone dipropionate 0.05 % external ointment  Commonly known as: DIPROLENE-AF  Used for: Eczema, unspecified type      Apply topically 2 times daily To hands, can cover with cotton gloves or saran wrap  Quantity: 50 g  Refills: 3     prenatal multivitamin w/iron 27-0.8 MG tablet  Used for: Supervision of other normal pregnancy, antepartum      Dose: 1 tablet  Take 1 tablet by mouth daily  Quantity: 90 tablet  Refills: 3     vitamin D3 50 mcg (2000 units) tablet  Commonly known as: CHOLECALCIFEROL  Used for: Vitamin D deficiency      Dose: 1 tablet  Take 1 tablet (2,000 Units) by mouth daily  Quantity: 90 tablet  Refills: 3           Where to get your medicines      These medications were sent to [facility] [place], [place] - [address]  [address] [address]    Phone: [phone]     acetaminophen 325 MG tablet    ibuprofen 600 MG tablet    senna-docusate 8.6-50  MG tablet     Some of these will need a paper prescription and others can be bought over the counter. Ask your nurse if you have questions.    Bring a paper prescription for each of these medications    oxyCODONE 5 MG tablet         Discharge/Disposition:  Trina Rooney was discharged to home in stable condition with the following instructions/medications:  1) Call for temperature > 100.4, bright red vaginal bleeding >1 pad an hour x 2 hours, foul smelling vaginal discharge, pain not controlled by usual oral pain meds, persistent nausea and vomiting not controlled on medications, drainage or redness from incision site  2) s/p BTL for contraception  3) For feeding she decided to breast feed.  4) She was instructed to follow-up with her primary OB in 6 weeks for a routine postpartum visit.  5) Discharge activity:  No heavy lifting >15 lbs or strenuous activity for 6 weeks, pelvic rest for 6 weeks, no driving or operating machinery while on narcotics.      Lola Cabrera MD

## 2024-02-04 ENCOUNTER — HOSPITAL ENCOUNTER (EMERGENCY)
Facility: CLINIC | Age: 39
Discharge: HOME | End: 2024-02-04
Attending: EMERGENCY MEDICINE | Admitting: EMERGENCY MEDICINE
Payer: COMMERCIAL

## 2024-02-04 VITALS
TEMPERATURE: 98.5 F | BODY MASS INDEX: 26.31 KG/M2 | SYSTOLIC BLOOD PRESSURE: 134 MMHG | OXYGEN SATURATION: 98 % | RESPIRATION RATE: 16 BRPM | HEIGHT: 66 IN | HEART RATE: 71 BPM | DIASTOLIC BLOOD PRESSURE: 84 MMHG

## 2024-02-04 DIAGNOSIS — M54.50 ACUTE MIDLINE LOW BACK PAIN WITHOUT SCIATICA: ICD-10-CM

## 2024-02-04 PROCEDURE — 99283 EMERGENCY DEPT VISIT LOW MDM: CPT | Performed by: EMERGENCY MEDICINE

## 2024-02-04 PROCEDURE — 99284 EMERGENCY DEPT VISIT MOD MDM: CPT | Performed by: EMERGENCY MEDICINE

## 2024-02-04 PROCEDURE — 96372 THER/PROPH/DIAG INJ SC/IM: CPT | Performed by: EMERGENCY MEDICINE

## 2024-02-04 PROCEDURE — 250N000011 HC RX IP 250 OP 636: Performed by: EMERGENCY MEDICINE

## 2024-02-04 RX ORDER — KETOROLAC TROMETHAMINE 15 MG/ML
15 INJECTION, SOLUTION INTRAMUSCULAR; INTRAVENOUS ONCE
Status: COMPLETED | OUTPATIENT
Start: 2024-02-04 | End: 2024-02-04

## 2024-02-04 RX ORDER — OXYCODONE HYDROCHLORIDE 5 MG/1
5 TABLET ORAL EVERY 6 HOURS PRN
Qty: 6 TABLET | Refills: 0 | Status: SHIPPED | OUTPATIENT
Start: 2024-02-04

## 2024-02-04 RX ADMIN — KETOROLAC TROMETHAMINE 15 MG: 15 INJECTION, SOLUTION INTRAMUSCULAR; INTRAVENOUS at 01:38

## 2024-02-04 ASSESSMENT — ACTIVITIES OF DAILY LIVING (ADL): ADLS_ACUITY_SCORE: 33

## 2024-02-04 NOTE — ED PROVIDER NOTES
[facility] EMERGENCY DEPARTMENT ([facility])    24      ED PROVIDER NOTE      History     Chief Complaint   Patient presents with    Back Pain     Pt has had back pain x2 days. Was moving a patient at work and did not feel injury at the time but woke in the morning with a painful right lower back. Denies numbness or tingling. Used tylenol at home with no relief.      The history is provided by the patient and medical records.     Trina Rooney is a 38 year old female who presents to the ED for evaluation of  lower back pain. 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  She did not feel any injury at the time and denies any other trauma or injury. Patient describes the pain as dull, achy, and constant. The pain worsens with bending or movement. Denies numbness, weakness, or tingling in the legs.  Has attempted taking Tylenol at home with no relief, last dose taken last evening. She has attempted using a lidocaine patch with no relief. No additional trauma, falls, or injury. No loss of bowel or bladder control, no urinary symptoms. Denies a history of back problems or additional past medical history. No additional complaints.    Past Medical History  Past Medical History:   Diagnosis Date    H. pylori infection 2013    History of positive PPD     Latent tuberculosis     Placenta abruptio      Past Surgical History:   Procedure Laterality Date    C/SECTION, LOW TRANSVERSE N/A 2014    , Low Transverse x2     SECTION N/A 2019    Procedure:  SECTION;  Surgeon: Adalgisa Gaona MD;  Location: [facility] L[facility]     SECTION, TUBAL LIGATION, COMBINED Bilateral 2020    Procedure:  SECTION, WITH POSTPARTUM TUBAL LIGATION;  Surgeon: Nette Bui MD;  Location: [facility] L[facility]     oxyCODONE (ROXICODONE) 5 MG tablet  chlorhexidine (HIBICLENS) 4 %  "liquid  cyclobenzaprine (FLEXERIL) 10 MG tablet  omeprazole (PRILOSEC) 20 MG DR capsule  ondansetron (ZOFRAN ODT) 4 MG ODT tab  Prenatal Vit-Fe Fumarate-FA (PRENATAL MULTIVITAMIN W/IRON) 27-0.8 MG tablet  vitamin D3 (CHOLECALCIFEROL) 2000 units (50 mcg) tablet      No Known Allergies  Family History  Family History   Problem Relation Age of Onset    Family History Negative Other      Social History   Social History     Tobacco Use    Smoking status: Never    Smokeless tobacco: Never   Substance Use Topics    Alcohol use: No    Drug use: No      Past medical history, past surgical history, medications, allergies, family history, and social history were reviewed with the patient. No additional pertinent items.      A medically appropriate review of systems was performed with pertinent positives and negatives noted in the HPI, and all other systems negative.    Physical Exam   BP: 134/84  Pulse: 71  Temp: 98.5  F (36.9  C)  Resp: 16  Height: 167.6 cm (5' 6\")  SpO2: 98 %  Physical Exam  General: Afebrile, no acute distress   HEENT: Normocephalic, atraumatic, conjunctivae normal. MMM  Neck: non-tender, supple  Cardio: regular rate. regular rhythm   Resp: Normal work of breathing, no respiratory distress, lungs clear bilaterally, no wheezing, rhonchi, rales  Chest/Back: no visual signs of trauma, no midline TTP, no CVA tenderness, patient reports low back pain that is worse with movement, flexion  Abdomen: soft, non distension, no tenderness, no peritoneal signs   Neuro: alert and fully oriented. CN II-XII grossly intact. Normal strength and sensation in all extremities.  No focal weakness, no saddle paresthesias.  Normal gait  MSK: no deformities. Normal range of motion  Integumentary/Skin: no rash visualized, normal color  Psych: normal affect, normal behavior    ED Course, Procedures, & Data    1:06 AM  The patient was seen and examined by Melanie Tatum   in Room ED20.     Procedures    No results found for any " visits on 02/04/24.  Medications   ketorolac (TORADOL) injection 15 mg (15 mg Intramuscular $Given 2/4/24 0139)     Labs Ordered and Resulted from Time of ED Arrival to Time of ED Departure - No data to display  No orders to display          Critical care was not performed.     Medical Decision Making  The patient's presentation was of moderate complexity (an acute complicated injury).    The patient's evaluation involved:  review of external note(s) from 1 sources (prior ED notes)  review of 1 test result(s) ordered prior to this encounter (cxr)  strong consideration of a test (imaging) that was ultimately deferred    The patient's management necessitated moderate risk (prescription drug management including medications given in the ED).    Assessment & Plan    Trina Rooney is a 38 year old female who presents to the ED for evaluation of  lower back pain.  Upon arrival patient is nontoxic-appearing, afebrile, no distress.  Patient hemodynamically stable vital signs within normal limits.  Patient here with low back pain for the last 2 days.  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  Patient denies any other trauma or injury.  No red flags.  Denies any fever, no history of cancer, no history of IV drug use, denies any weakness, tingling, numbness, urinary or bowel incontinence or retention.  Patient was treated with a dose of Toradol in the emergency department.  I discussed with patient at this time recommend continue supportive care with Tylenol, ibuprofen, lidocaine patch, and close outpatient follow-up with her primary care provider as well as physical therapy.  No emergent need for imaging at this time however did discuss if no improvement of her symptoms would consider imaging after a trial of supportive care/pain control/PT.  Patient understands and agrees with the plan.  Will follow-up outpatient with her primary care provider, pain management discussed,  strict return precautions discussed.  Patient understands agrees with the plan.      I have reviewed the nursing notes. I have reviewed the findings, diagnosis, plan and need for follow up with the patient.    Discharge Medication List as of 2/4/2024  1:56 AM          Final diagnoses:   Acute midline low back pain without sciatica   IWinter, am serving as a trained medical scribe to document services personally performed by Melanie Tatum MD, based on the provider's statements to me.     IMelanie MD, was physically present and have reviewed and verified the accuracy of this note documented by Winter Parrish.      Melanie Tatum MD  [facility] EMERGENCY DEPARTMENT  2/4/2024     Melanie Tatum MD  02/04/24 0444

## 2024-02-04 NOTE — ED TRIAGE NOTES
Pt has had back pain x2 days. Was moving a patient at work and did not feel injury at the time but woke in the morning with a painful right lower back. Denies numbness or tingling. Used tylenol at home with no relief.      Triage Assessment (Adult)       Row Name 02/04/24 0049          Triage Assessment    Airway WDL WDL        Respiratory WDL    Respiratory WDL WDL        Skin Circulation/Temperature WDL    Skin Circulation/Temperature WDL WDL        Cardiac WDL    Cardiac WDL WDL        Peripheral/Neurovascular WDL    Peripheral Neurovascular WDL WDL        Cognitive/Neuro/Behavioral WDL    Cognitive/Neuro/Behavioral WDL WDL

## 2024-02-04 NOTE — DISCHARGE INSTRUCTIONS
Thank you for your patience today.  Please follow-up with your regular doctor or in one of our clinics in the next 2-3 days for further evaluation and follow-up care.  Please call to schedule an appointment.  [facility] (formerly [facility] [phone] or [facility] [phone]. Please continue your own medications.  Please alternate taking Tylenol 1000 mg every 6 hours and ibuprofen 600 mg every 6 hours as needed for pain.  If you alternate these medications you should be taking something every 3 hours.  Please apply lidocaine patch to affected area to help with discomfort.  Please take oxycodone as needed for severe pain or at bedtime.  Please return to the ER if you develop severe pain, weakness, tingling, numbness, loss of bowel or bladder function, or any worsening of your current symptoms.  It was a pleasure taking care of you today.  We hope you feel better soon.

## 2024-02-04 NOTE — LETTER
February 4, 2024      To Whom It May Concern:      Trina Rooney was seen in our Emergency Department today, 02/04/24.  I expect her condition to improve over the next 1-2 weeks. No heavy lifting until improvement of symptoms or cleared by primary care provider.      Sincerely,        Melanie Tatum MD
